# Patient Record
Sex: FEMALE | Race: WHITE | NOT HISPANIC OR LATINO | Employment: OTHER | URBAN - METROPOLITAN AREA
[De-identification: names, ages, dates, MRNs, and addresses within clinical notes are randomized per-mention and may not be internally consistent; named-entity substitution may affect disease eponyms.]

---

## 2017-01-03 ENCOUNTER — APPOINTMENT (OUTPATIENT)
Dept: PHYSICAL THERAPY | Facility: HOSPITAL | Age: 52
End: 2017-01-03

## 2017-01-03 ENCOUNTER — HOSPITAL ENCOUNTER (OUTPATIENT)
Dept: PHYSICAL THERAPY | Facility: HOSPITAL | Age: 52
Setting detail: THERAPIES SERIES
Discharge: HOME | End: 2017-01-03

## 2017-01-03 DIAGNOSIS — G89.29 CHRONIC BILATERAL LOW BACK PAIN, WITH SCIATICA PRESENCE UNSPECIFIED: Primary | ICD-10-CM

## 2017-01-03 DIAGNOSIS — M54.5 CHRONIC BILATERAL LOW BACK PAIN, WITH SCIATICA PRESENCE UNSPECIFIED: Primary | ICD-10-CM

## 2017-01-03 PROCEDURE — 97110 THERAPEUTIC EXERCISES: CPT | Performed by: PHYSICAL THERAPIST

## 2017-01-03 PROCEDURE — 97012 MECHANICAL TRACTION THERAPY: CPT | Performed by: PHYSICAL THERAPIST

## 2017-01-03 NOTE — PROGRESS NOTES
"    Outpatient Physical Therapy Ortho Treatment Note  [facility]     Patient Name: Braydon Soria  : 1965  MRN: [id number]  Today's Date: 1/3/2017      Visit Date: 2017    Visit Dx:    ICD-10-CM ICD-9-CM   1. Chronic bilateral low back pain, with sciatica presence unspecified M54.5 724.2    G89.29 338.29       Patient Active Problem List   Diagnosis   • Midline low back pain with left-sided sciatica   • Hyperreflexia   • Incomplete bladder emptying   • Thyroid enlargement        Past Medical History   Diagnosis Date   • Anxiety    • Arthritis    • Depression    • Gallbladder disease      gallstones   • Hyperlipidemia    • Hypertension    • Insomnia    • Low back pain    • Lumbosacral disc disease    • Migraines    • Thyroid nodule         Past Surgical History   Procedure Laterality Date   •  section       CHILDREN WERE BORN IN [place]   • Tonsillectomy     • Colonoscopy N/A 2016     Procedure: COLONOSCOPY TO CECUM WITH HOT SNARE POLYPECTOMY AND CLIPx1;  Surgeon: Molina Smith MD;  Location: [facility] ENDOSCOPY;  Service:    • Endoscopy N/A 2016     Procedure: ESOPHAGOGASTRODUODENOSCOPY;  Surgeon: Molina Smith MD;  Location: [facility] ENDOSCOPY;  Service:                              PT Assessment/Plan       17 1309          PT Assessment    Assessment Comments Passive stretching of B hip flexors continues to reproduce lumbar pain but overall is improved as pain rating was down to a 3-4/10 today. Progressing with core/extensor strengthening program. Continued with traction as she reports \"it is helping the most.\"  -CK      PT Plan    PT Plan Comments DTM to lumbar/gluteals for tissue tension release  -CK        User Key  (r) = Recorded By, (t) = Taken By, (c) = Cosigned By    Initials Name Provider Type    MAURICIO Fishman, PT Physical Therapist                Modalities       17 1000          Traction [id number]    Traction Type Lumbar  -CK      Rx Minutes 15  " -CK      Duration Intermittent  -CK      Position Hook-lying  -CK      Weight --   65/30  -CK      Hold 40  -CK      Relax 15  -CK        User Key  (r) = Recorded By, (t) = Taken By, (c) = Cosigned By    Initials Name Provider Type    CK Steve Fishman, PT Physical Therapist                Exercises       01/03/17 1000          Subjective Comments    Subjective Comments I am doing better. Still there a little but better  -CK      Subjective Pain    Able to rate subjective pain? yes  -CK      Pre-Treatment Pain Level 3  -CK      Post-Treatment Pain Level 3  -CK      Exercise 1    Exercise Name 1 Nustep for warm up- L5  -CK      Resistance 1 --   L5  -CK      Time (Minutes) 1 6  -CK      Exercise 2    Exercise Name 2 PPT  -CK      Cueing 2 Verbal;Tactile  -CK      Reps 2 5  -CK      Time (Seconds) 2 5  -CK      Exercise 4    Exercise Name 4 LTR with BLE on ball  -CK      Cueing 4 Verbal  -CK      Reps 4 10  -CK      Exercise 5    Exercise Name 5 calf stretch, off edge of step   Gastroc and soleus  -CK      Cueing 5 Verbal  -CK      Reps 5 3  -CK      Time (Seconds) 5 30  -CK      Exercise 6    Exercise Name 6 Standing HS stretch  -CK      Cueing 6 Verbal  -CK      Reps 6 3  -CK      Time (Seconds) 6 30  -CK      Exercise 7    Exercise Name 7 Prone on elbows  -CK      Cueing 7 Verbal  -CK      Reps 7 5  -CK      Time (Seconds) 7 10  -CK      Exercise 8    Exercise Name 8 Sidelying hip abduction  -CK      Cueing 8 Verbal  -CK      Equipment 8 Cuff Weight  -CK      Weights/Plates 8 3  -CK      Reps 8 15  -CK      Exercise 9    Exercise Name 9 Sidelying clamshells  -CK      Cueing 9 Verbal  -CK      Equipment 9 Theraband  -CK      Resistance 9 Green  -CK      Reps 9 15  -CK      Exercise 10    Exercise Name 10 Resisted shoulder extension  -CK      Cueing 10 Verbal  -CK      Equipment 10 Theraband  -CK      Resistance 10 Blue  -CK      Reps 10 15  -CK      Exercise 11    Exercise Name 11 Resisted Rows  -CK      Cueing 11  Verbal  -CK      Equipment 11 Theraband  -CK      Resistance 11 Blue  -CK      Reps 11 15  -CK      Exercise 12    Exercise Name 12 Bridges on therapy ball  -CK      Cueing 12 Verbal  -CK      Reps 12 10  -CK      Time (Seconds) 12 3  -CK      Exercise 13    Exercise Name 13 Passive stretching of B quads/hip flexors while prone  -CK      Reps 13 3  -CK      Time (Seconds) 13 30  -CK      Exercise 14    Exercise Name 14 Resisted lat pulls  -CK      Cueing 14 Verbal  -CK      Equipment 14 Theraband  -CK      Resistance 14 Blue  -CK      Reps 14 20  -CK      Exercise 15    Exercise Name 15 Prone hip extension  -CK      Cueing 15 Demo  -CK      Equipment 15 Cuff Weight  -CK      Weights/Plates 15 2  -CK      Reps 15 10  -CK        User Key  (r) = Recorded By, (t) = Taken By, (c) = Cosigned By    Initials Name Provider Type    CK Steve KISHORE Fishman, PT Physical Therapist                               PT OP Goals       01/03/17 1300          PT Short Term Goals    STG 1 Patient will be independent and compliant with initial home exercise program.  -CK      STG 1 Progress Met  -CK      STG 2 pt. to be educated in/verbalize understanding of the importance of posture, body mechanics, and/or ergonomics in association with their condition to facilitate self management of their condition  -CK      STG 2 Progress Progressing  -CK      Long Term Goals    LTG 1 Patient will be independent and compliant with advanced home exercise program to facilitate self-management of symptoms  -CK      LTG 1 Progress Progressing  -CK      LTG 2 Patient will score </= 20% on Oswestry Index, indicating improved perceived functional ability with daily activities  -CK      LTG 2 Progress Ongoing  -CK      LTG 3 Patient will be able to ambulate 30 min without rest break  -CK      LTG 3 Progress Ongoing  -CK      LTG 4 Patient will report </= 2-3/10 pain with all functional activities.  -CK      LTG 4 Progress Ongoing  -CK      LTG 4 Progress Comments  3-4/10 today  -CK      LTG 5 Patient will report 50% reduction in leg pain/tightness.  -CK      LTG 5 Progress Ongoing  -CK      LTG 5 Progress Comments improving per patient report, did not give a number  -CK        User Key  (r) = Recorded By, (t) = Taken By, (c) = Cosigned By    Initials Name Provider Type    CK Steve Fishman, PT Physical Therapist                    Time Calculation:   Start Time: 1030  Stop Time: 1115  Time Calculation (min): 45 min    Therapy Charges for Today     Code Description Service Date Service Provider Modifiers Qty    [id number]  PT THER PROC EA 15 MIN 1/3/2017 Steve Fishman, PT GP 2    [id number]  PT TRACTION LUMBAR 1/3/2017 Steve Fishman, PT GP 1                    Steve Fishman, PT  1/3/2017

## 2017-01-05 ENCOUNTER — APPOINTMENT (OUTPATIENT)
Dept: PHYSICAL THERAPY | Facility: HOSPITAL | Age: 52
End: 2017-01-05

## 2017-01-10 ENCOUNTER — APPOINTMENT (OUTPATIENT)
Dept: PREADMISSION TESTING | Facility: HOSPITAL | Age: 52
End: 2017-01-10

## 2017-01-10 VITALS
SYSTOLIC BLOOD PRESSURE: 145 MMHG | BODY MASS INDEX: 31.89 KG/M2 | DIASTOLIC BLOOD PRESSURE: 88 MMHG | HEART RATE: 85 BPM | OXYGEN SATURATION: 98 % | TEMPERATURE: 98.1 F | RESPIRATION RATE: 16 BRPM | HEIGHT: 65 IN | WEIGHT: 191.4 LBS

## 2017-01-10 LAB
ANION GAP SERPL CALCULATED.3IONS-SCNC: 15 MMOL/L
BUN BLD-MCNC: 10 MG/DL (ref 6–20)
BUN/CREAT SERPL: 14.9 (ref 7–25)
CALCIUM SPEC-SCNC: 9.7 MG/DL (ref 8.6–10.5)
CHLORIDE SERPL-SCNC: 100 MMOL/L (ref 98–107)
CO2 SERPL-SCNC: 25 MMOL/L (ref 22–29)
CREAT BLD-MCNC: 0.67 MG/DL (ref 0.57–1)
DEPRECATED RDW RBC AUTO: 43.4 FL (ref 37–54)
ERYTHROCYTE [DISTWIDTH] IN BLOOD BY AUTOMATED COUNT: 12.9 % (ref 11.7–13)
GFR SERPL CREATININE-BSD FRML MDRD: 93 ML/MIN/1.73
GLUCOSE BLD-MCNC: 92 MG/DL (ref 65–99)
HCT VFR BLD AUTO: 42.8 % (ref 35.6–45.5)
HGB BLD-MCNC: 14.1 G/DL (ref 11.9–15.5)
MCH RBC QN AUTO: 30.6 PG (ref 26.9–32)
MCHC RBC AUTO-ENTMCNC: 32.9 G/DL (ref 32.4–36.3)
MCV RBC AUTO: 92.8 FL (ref 80.5–98.2)
PLATELET # BLD AUTO: 313 10*3/MM3 (ref 140–500)
PMV BLD AUTO: 10.2 FL (ref 6–12)
POTASSIUM BLD-SCNC: 3.6 MMOL/L (ref 3.5–5.2)
RBC # BLD AUTO: 4.61 10*6/MM3 (ref 3.9–5.2)
SODIUM BLD-SCNC: 140 MMOL/L (ref 136–145)
WBC NRBC COR # BLD: 10.03 10*3/MM3 (ref 4.5–10.7)

## 2017-01-10 PROCEDURE — 36415 COLL VENOUS BLD VENIPUNCTURE: CPT

## 2017-01-10 PROCEDURE — 93005 ELECTROCARDIOGRAM TRACING: CPT

## 2017-01-10 PROCEDURE — 80048 BASIC METABOLIC PNL TOTAL CA: CPT | Performed by: SURGERY

## 2017-01-10 PROCEDURE — 85027 COMPLETE CBC AUTOMATED: CPT | Performed by: SURGERY

## 2017-01-10 PROCEDURE — 93010 ELECTROCARDIOGRAM REPORT: CPT | Performed by: INTERNAL MEDICINE

## 2017-01-10 NOTE — DISCHARGE INSTRUCTIONS
Take the following medications the morning of surgery with a small sip of water.    AMLODIPINE    ARRIVAL TIME  08:00      General Instructions:  • Do not eat or drink after midnight: includes water, mints, or gum. You may brush your teeth.  • Do not smoke, chew tobacco, or drink alcohol.  • Bring medications in original bottles, any inhalers and if applicable your C-PAP/ BI-PAP machine.  • Bring any papers given to you in the doctor’s office.  • Wear clean comfortable clothes and socks.  • Do not wear contact lenses or make-up.  Bring a case for your glasses if applicable.   • Bring crutches or walker if applicable.  • Leave all other valuables and jewelry at home.        Preventing a Surgical Site Infection:  Shower on the morning of surgery using a fresh bar of anti-bacterial soap (such as Dial) and clean washcloth.  Dry with a clean towel and dress in clean clothing.  For 2 to 3 days before surgery, avoid shaving with a razor near where you will have surgery because the razor can irritate skin and make it easier to develop an infection  Ask your surgeon if you will be receiving antibiotics prior to surgery  Make sure you, your family, and all healthcare providers clean their hands with soap and water or an alcohol based hand  before caring for you or your wound  If at all possible, quit smoking as many days before surgery as you can.    Day of surgery:  Upon arrival, a Pre-op nurse and Anesthesiologist will review your health history, obtain vital signs, and answer questions you may have.  The only belongings needed at this time will be your home medications and if applicable your C-PAP/BI-PAP machine.  If you are staying overnight your family can leave the rest of your belongings in the car and bring them to your room later.  A Pre-op nurse will start an IV and you may receive medication in preparation for surgery, including something to help you relax.  Your family will be able to see you in the  Pre-op area.  While you are in surgery your family should notify the waiting room  if they leave the waiting room area and provide a contact phone number.    Please be aware that surgery does come with discomfort.  We want to make every effort to control your discomfort so please discuss any uncontrolled symptoms with your nurse.   Your doctor will most likely have prescribed pain medications.      If you are going home after surgery you will receive individualized written care instructions before being discharged.  A responsible adult must drive you to and from the hospital on the day of your surgery and stay with you for 24 hours.    If you are staying overnight following surgery, you will be transported to your hospital room following the recovery period.  [facility] has all private rooms.    If you have any questions please call Pre-Admission Testing at [phone].  Deductibles and co-payments are collected on the day of service. Please be prepared to pay the required co-pay, deductible or deposit on the day of service as defined by your plan.

## 2017-01-10 NOTE — MR AVS SNAPSHOT
"                        Zemka Smajlagic   1/10/2017 1:30 PM   Appointment    Provider:  ESTELITA Jon   Department:  [facility] PREADMISSION T   Dept Phone:  [phone]                Your Full Care Plan           To Do List     1/12/2017 5:00 PM     Appointment with Steve Fishman, PT at [facility] ([phone])   [address] [facility] [address]            Your Updated Medication List          This list is accurate as of: 1/10/17  1:37 PM.  Always use your most recent med list.                amLODIPine 5 MG tablet   Commonly known as:  NORVASC       atorvastatin 20 MG tablet   Commonly known as:  LIPITOR       citalopram 20 MG tablet   Commonly known as:  CeleXA       TOPROL XL 50 MG 24 hr tablet   Generic drug:  metoprolol succinate XL               Klatcher Signup     Our records indicate that you have an active [facility] Klatcher account.    You can view your After Visit Summary by going to [employer] and logging in with your Klatcher username and password.  If you don't have a Klatcher username and password but a parent or guardian has access to your record, the parent or guardian should login with their own Klatcher username and password and access your record to view the After Visit Summary.    If you have questions, you can email [email or web address] or call [phone] to talk to our Klatcher staff.  Remember, Klatcher is NOT to be used for urgent needs.  For medical emergencies, dial 911.               Other Info from Your Visit           Allergies     No Known Allergies      Vital Signs     Blood Pressure Pulse Temperature Respirations Height Weight    145/88 (BP Location: Right arm, Patient Position: Sitting) 85 98.1 °F (36.7 °C) 16 65\" (165.1 cm) 191 lb 6.4 oz (86.8 kg)    Last Menstrual Period Oxygen Saturation Body Mass Index Smoking Status          01/01/2015 98% 31.85 kg/m2 Never Smoker          Discharge Instructions  "      Take the following medications the morning of surgery with a small sip of water.    AMLODIPINE    ARRIVAL TIME  08:00      General Instructions:  • Do not eat or drink after midnight: includes water, mints, or gum. You may brush your teeth.  • Do not smoke, chew tobacco, or drink alcohol.  • Bring medications in original bottles, any inhalers and if applicable your C-PAP/ BI-PAP machine.  • Bring any papers given to you in the doctor’s office.  • Wear clean comfortable clothes and socks.  • Do not wear contact lenses or make-up.  Bring a case for your glasses if applicable.   • Bring crutches or walker if applicable.  • Leave all other valuables and jewelry at home.        Preventing a Surgical Site Infection:  Shower on the morning of surgery using a fresh bar of anti-bacterial soap (such as Dial) and clean washcloth.  Dry with a clean towel and dress in clean clothing.  For 2 to 3 days before surgery, avoid shaving with a razor near where you will have surgery because the razor can irritate skin and make it easier to develop an infection  Ask your surgeon if you will be receiving antibiotics prior to surgery  Make sure you, your family, and all healthcare providers clean their hands with soap and water or an alcohol based hand  before caring for you or your wound  If at all possible, quit smoking as many days before surgery as you can.    Day of surgery:  Upon arrival, a Pre-op nurse and Anesthesiologist will review your health history, obtain vital signs, and answer questions you may have.  The only belongings needed at this time will be your home medications and if applicable your C-PAP/BI-PAP machine.  If you are staying overnight your family can leave the rest of your belongings in the car and bring them to your room later.  A Pre-op nurse will start an IV and you may receive medication in preparation for surgery, including something to help you relax.  Your family will be able to see you in the  Pre-op area.  While you are in surgery your family should notify the waiting room  if they leave the waiting room area and provide a contact phone number.    Please be aware that surgery does come with discomfort.  We want to make every effort to control your discomfort so please discuss any uncontrolled symptoms with your nurse.   Your doctor will most likely have prescribed pain medications.      If you are going home after surgery you will receive individualized written care instructions before being discharged.  A responsible adult must drive you to and from the hospital on the day of your surgery and stay with you for 24 hours.    If you are staying overnight following surgery, you will be transported to your hospital room following the recovery period.  [facility] has all private rooms.    If you have any questions please call Pre-Admission Testing at [phone].  Deductibles and co-payments are collected on the day of service. Please be prepared to pay the required co-pay, deductible or deposit on the day of service as defined by your plan.       SYMPTOMS OF A STROKE    Call 911 or have someone take you to the Emergency Department if you have any of the following:    · Sudden numbness or weakness of your face, arm or leg especially on one side of the body  · Sudden confusion, diffiiculty speaking or trouble understanding   · Changes in your vision or loss of sight in one eye  · Sudden severe headache with no known cause  · sudden dizziness, trouble walking, loss of balance or coordination    It is important to seek emergency care right away if you have further stroke symptoms. If you get emergency help quickly, the powerful clot-dissolving medicines can reduce the disabilities caused by a stroke.     For more information:    American Stroke Association  2-916-2-STROKE  www.strokeassociation.org           IF YOU SMOKE OR USE TOBACCO PLEASE READ THE FOLLOWING:    Why is smoking bad for  me?  Smoking increases the risk of heart disease, lung disease, vascular disease, stroke, and cancer.     If you smoke, STOP!    If you would like more information on quitting smoking, please visit the [employer] website: [email or web address]   This link will provide additional resources including the QUIT line and the [employer] support groups.     For more information:    American Cancer Society  ([phone] [phone]    American Heart Association  1-418.412.9814

## 2017-01-11 ENCOUNTER — TRANSCRIBE ORDERS (OUTPATIENT)
Dept: SURGERY | Facility: CLINIC | Age: 52
End: 2017-01-11

## 2017-01-11 DIAGNOSIS — Z01.818 PREOPERATIVE CLEARANCE: Primary | ICD-10-CM

## 2017-01-11 DIAGNOSIS — R94.31 ABNORMAL EKG: ICD-10-CM

## 2017-01-12 ENCOUNTER — OFFICE VISIT (OUTPATIENT)
Dept: CARDIOLOGY | Facility: CLINIC | Age: 52
End: 2017-01-12

## 2017-01-12 ENCOUNTER — APPOINTMENT (OUTPATIENT)
Dept: PHYSICAL THERAPY | Facility: HOSPITAL | Age: 52
End: 2017-01-12

## 2017-01-12 VITALS
WEIGHT: 189 LBS | HEIGHT: 65 IN | SYSTOLIC BLOOD PRESSURE: 140 MMHG | BODY MASS INDEX: 31.49 KG/M2 | RESPIRATION RATE: 16 BRPM | HEART RATE: 87 BPM | DIASTOLIC BLOOD PRESSURE: 100 MMHG

## 2017-01-12 DIAGNOSIS — I10 ESSENTIAL HYPERTENSION: ICD-10-CM

## 2017-01-12 DIAGNOSIS — Z01.810 PREOP CARDIOVASCULAR EXAM: Primary | ICD-10-CM

## 2017-01-12 PROCEDURE — 93000 ELECTROCARDIOGRAM COMPLETE: CPT | Performed by: INTERNAL MEDICINE

## 2017-01-12 PROCEDURE — 99203 OFFICE O/P NEW LOW 30 MIN: CPT | Performed by: INTERNAL MEDICINE

## 2017-01-12 NOTE — PROGRESS NOTES
PATIENTINFORMATION    Date of Office Visit: 2017  Encounter Provider: Chitra Castillo MD  Place of Service: [facility] CARDIOLOGY  Patient Name: Braydon Soria  : 1965    Subjective:     Encounter Date:2017      Patient ID: Braydon Soria is a 51 y.o. female.      History of Present Illness     This is a woman, who I saw in the hospital back in  with chest pain.  She ruled out for myocardial infarction and I discharged her home.  She came back to the office in 2013 and had an exercise echocardiogram, which showed normal LV systolic function at rest with an ejection fraction of 63% and no significant valvular heart disease.  The stress portion of the test was normal.  She had Bhumi performed in 2015 due to leg pain and those were negative.  She is supposed to be getting her gallbladder out tomorrow.  Her preoperative EKG was read as abnormal and she was referred to see me.  She has a history of that includes hypertension and hyperlipidemia.  She denies any chest pain, shortness of breath or lower extremity edema.  She is not formerly exercising due to back and leg pain, but she is starting to go to physical therapy.  She does walking around her neighborhood and has no symptoms with exertion.  Her other risk factor is a premature family history of coronary disease in her brother and father both at age less than 50.      Review of Systems   Constitution: Negative for fever, malaise/fatigue, weight gain and weight loss.   HENT: Negative for ear pain, hearing loss, nosebleeds and sore throat.    Eyes: Negative for double vision, pain, vision loss in left eye and vision loss in right eye.   Cardiovascular:        See history of present illness.   Respiratory: Positive for snoring. Negative for cough, shortness of breath, sleep disturbances due to breathing and wheezing.    Endocrine: Negative for cold intolerance, heat intolerance and polyuria.   Skin:  "Negative for itching, poor wound healing and rash.   Musculoskeletal: Positive for joint pain. Negative for joint swelling and myalgias.   Gastrointestinal: Negative for abdominal pain, diarrhea, hematochezia, nausea and vomiting.   Genitourinary: Negative for hematuria and hesitancy.   Neurological: Negative for numbness, paresthesias and seizures.   Psychiatric/Behavioral: Positive for depression. The patient is not nervous/anxious.            ECG 12 Lead  Date/Time: 1/12/2017 11:10 AM  Performed by: PIOTR FLORES  Authorized by: PIOTR FLORES   Comparison: compared with previous ECG from 1/10/2017  Similar to previous ECG  Rhythm: sinus rhythm  BPM: 87  Conduction: conduction normal  ST Segments: ST segments normal  Clinical impression: normal ECG               Objective:       Visit Vitals   • /100 (BP Location: Right arm, Patient Position: Sitting, Cuff Size: Adult)   • Pulse 87   • Resp 16   • Ht 65\" (165.1 cm)   • Wt 189 lb (85.7 kg)   • LMP 01/01/2015   • BMI 31.45 kg/m2    Body mass index is 31.45 kg/(m^2).     Physical Exam   Constitutional: She appears well-developed.   HENT:   Head: Normocephalic and atraumatic.   Eyes: Conjunctivae and lids are normal. Pupils are equal, round, and reactive to light. Lids are everted and swept, no foreign bodies found.   Neck: Normal range of motion. No JVD present. Carotid bruit is not present. No tracheal deviation present. No thyroid mass present.   Cardiovascular: Normal rate, regular rhythm and normal heart sounds.    Pulses:       Dorsalis pedis pulses are 2+ on the right side, and 2+ on the left side.   Pulmonary/Chest: Effort normal and breath sounds normal.   Abdominal: Normal appearance and bowel sounds are normal.   Musculoskeletal: Normal range of motion.   Neurological: She is alert. She has normal strength.   Skin: Skin is warm, dry and intact.   Psychiatric: She has a normal mood and affect. Her behavior is normal.   Vitals reviewed.      Lab " Review:  I reviewed her lab work from her preop labs      Assessment/Plan:        1. She is Kendell Revised Risk Class 1, which confers a 0.4% chance of cardiovascular event with surgery.  She is not having any active symptoms to indicate angina.  Her EKG has T-wave inversion in lead V3, but that can be a normal variant.  I do not recommend any further cardiac testing prior to surgery, given that she is low risk for cardiovascular event  and has no symptoms to suggest active angina and can complete greater than 4 METS.    2. Hypertension.  Her blood pressure is a little bit high here today.  I have encouraged her to check her blood pressure on a regular basis, write the numbers down and meet up with Dr. Carbajal after her surgery.    3. Hyperlipidemia.  The patient is on Atorvastatin; followed by Dr. Carbajal.       I will be happy to see her back in the future if she has any problems. Please do not hesitate to call me if she has any issues with her surgery.         Orders Placed This Encounter   Procedures   • ECG 12 Lead     This order was created via procedure documentation      Braydon Soria   Home Medication Instructions ZULAY:    Printed on:01/12/17 1111   Medication Information                      amLODIPine (NORVASC) 5 MG tablet  5 mg Daily With Breakfast.             atorvastatin (LIPITOR) 20 MG tablet  20 mg Daily.             citalopram (CeleXA) 20 MG tablet  40 mg daily.             TOPROL XL 50 MG 24 hr tablet  50 mg Daily.                        Chitra Castillo MD  01/12/17  11:11 AM

## 2017-01-12 NOTE — MR AVS SNAPSHOT
[facility] CARDIOLOGY  [phone]                    Ganeshmmina Smajlagic   1/12/2017 11:20 AM   Office Visit    Dept Phone:  [phone]   Encounter #:  [id number]    Provider:  Chitra Castillo MD   Department:  [facility] CARDIOLOGY                Your Full Care Plan              Your Updated Medication List          This list is accurate as of: 1/12/17 10:53 AM.  Always use your most recent med list.                amLODIPine 5 MG tablet   Commonly known as:  NORVASC       atorvastatin 20 MG tablet   Commonly known as:  LIPITOR       citalopram 20 MG tablet   Commonly known as:  CeleXA       TOPROL XL 50 MG 24 hr tablet   Generic drug:  metoprolol succinate XL               Instructions     None    Patient Instructions History      Upcoming Appointments     Visit Type Date Time Department    TREATMENT 1/12/2017  5:00 PM [facility]    NEW PATIENT 1/12/2017 11:20 AM [facility] Signup     Our records indicate that you have an active [employer] account.    You can view your After Visit Summary by going to [email or web address] and logging in with your [employer] username and password.  If you don't have a [employer] username and password but a parent or guardian has access to your record, the parent or guardian should login with their own [employer] username and password and access your record to view the After Visit Summary.    If you have questions, you can email [email or web address] or call [phone] to talk to our [employer] staff.  Remember, [employer] is NOT to be used for urgent needs.  For medical emergencies, dial 911.               Other Info from Your Visit           Your Appointments     Jan 12, 2017 11:20 AM EST   New Patient with Chitra Castillo MD   [facility] C[facility]IOLOGY (--)    [facility] [address] [address] [phone]           Bring all  "previous medical records and films, along with current medications and insurance information.            Jan 12, 2017  5:00 PM EST   Therapy Treatment with Steve Fishman, PT   [facility] OP PT [address] [address] [phone]              Allergies     No Known Allergies      Vital Signs     Blood Pressure Pulse Respirations Height Weight Last Menstrual Period    140/100 (BP Location: Right arm, Patient Position: Sitting, Cuff Size: Adult) 87 16 65\" (165.1 cm) 189 lb (85.7 kg) 01/01/2015    Body Mass Index Smoking Status                31.45 kg/m2 Never Smoker            "

## 2017-01-13 ENCOUNTER — ANESTHESIA (OUTPATIENT)
Dept: PERIOP | Facility: HOSPITAL | Age: 52
End: 2017-01-13

## 2017-01-13 ENCOUNTER — ANESTHESIA EVENT (OUTPATIENT)
Dept: PERIOP | Facility: HOSPITAL | Age: 52
End: 2017-01-13

## 2017-01-13 ENCOUNTER — APPOINTMENT (OUTPATIENT)
Dept: GENERAL RADIOLOGY | Facility: HOSPITAL | Age: 52
End: 2017-01-13

## 2017-01-13 PROCEDURE — 25010000002 ONDANSETRON PER 1 MG: Performed by: ANESTHESIOLOGY

## 2017-01-13 PROCEDURE — 25010000002 DEXAMETHASONE PER 1 MG: Performed by: ANESTHESIOLOGY

## 2017-01-13 PROCEDURE — 25010000002 NEOSTIGMINE 10 MG/10ML SOLUTION: Performed by: NURSE ANESTHETIST, CERTIFIED REGISTERED

## 2017-01-13 PROCEDURE — 25010000002 FENTANYL CITRATE (PF) 100 MCG/2ML SOLUTION: Performed by: ANESTHESIOLOGY

## 2017-01-13 PROCEDURE — 74300 X-RAY BILE DUCTS/PANCREAS: CPT

## 2017-01-13 PROCEDURE — 25010000002 PROPOFOL 10 MG/ML EMULSION: Performed by: ANESTHESIOLOGY

## 2017-01-13 PROCEDURE — 25010000002 KETOROLAC TROMETHAMINE PER 15 MG: Performed by: ANESTHESIOLOGY

## 2017-01-13 RX ORDER — ONDANSETRON 2 MG/ML
INJECTION INTRAMUSCULAR; INTRAVENOUS AS NEEDED
Status: DISCONTINUED | OUTPATIENT
Start: 2017-01-13 | End: 2017-01-13 | Stop reason: SURG

## 2017-01-13 RX ORDER — NEOSTIGMINE METHYLSULFATE 1 MG/ML
INJECTION, SOLUTION INTRAVENOUS AS NEEDED
Status: DISCONTINUED | OUTPATIENT
Start: 2017-01-13 | End: 2017-01-13 | Stop reason: SURG

## 2017-01-13 RX ORDER — GLYCOPYRROLATE 0.2 MG/ML
INJECTION INTRAMUSCULAR; INTRAVENOUS AS NEEDED
Status: DISCONTINUED | OUTPATIENT
Start: 2017-01-13 | End: 2017-01-13 | Stop reason: SURG

## 2017-01-13 RX ORDER — ROCURONIUM BROMIDE 10 MG/ML
INJECTION, SOLUTION INTRAVENOUS AS NEEDED
Status: DISCONTINUED | OUTPATIENT
Start: 2017-01-13 | End: 2017-01-13 | Stop reason: SURG

## 2017-01-13 RX ORDER — FENTANYL CITRATE 50 UG/ML
INJECTION, SOLUTION INTRAMUSCULAR; INTRAVENOUS AS NEEDED
Status: DISCONTINUED | OUTPATIENT
Start: 2017-01-13 | End: 2017-01-13 | Stop reason: SURG

## 2017-01-13 RX ORDER — KETOROLAC TROMETHAMINE 30 MG/ML
INJECTION, SOLUTION INTRAMUSCULAR; INTRAVENOUS AS NEEDED
Status: DISCONTINUED | OUTPATIENT
Start: 2017-01-13 | End: 2017-01-13 | Stop reason: SURG

## 2017-01-13 RX ORDER — DEXAMETHASONE SODIUM PHOSPHATE 10 MG/ML
INJECTION INTRAMUSCULAR; INTRAVENOUS AS NEEDED
Status: DISCONTINUED | OUTPATIENT
Start: 2017-01-13 | End: 2017-01-13 | Stop reason: SURG

## 2017-01-13 RX ORDER — PROPOFOL 10 MG/ML
VIAL (ML) INTRAVENOUS AS NEEDED
Status: DISCONTINUED | OUTPATIENT
Start: 2017-01-13 | End: 2017-01-13 | Stop reason: SURG

## 2017-01-13 RX ADMIN — FENTANYL CITRATE 50 MCG: 50 INJECTION INTRAMUSCULAR; INTRAVENOUS at 10:07

## 2017-01-13 RX ADMIN — FENTANYL CITRATE 50 MCG: 50 INJECTION INTRAMUSCULAR; INTRAVENOUS at 10:09

## 2017-01-13 RX ADMIN — NEOSTIGMINE METHYLSULFATE 3 MG: 1 INJECTION INTRAVENOUS at 09:52

## 2017-01-13 RX ADMIN — KETOROLAC TROMETHAMINE 30 MG: 30 INJECTION, SOLUTION INTRAMUSCULAR; INTRAVENOUS at 09:34

## 2017-01-13 RX ADMIN — SODIUM CHLORIDE, POTASSIUM CHLORIDE, SODIUM LACTATE AND CALCIUM CHLORIDE: 600; 310; 30; 20 INJECTION, SOLUTION INTRAVENOUS at 10:00

## 2017-01-13 RX ADMIN — DEXAMETHASONE SODIUM PHOSPHATE 8 MG: 10 INJECTION INTRAMUSCULAR; INTRAVENOUS at 09:34

## 2017-01-13 RX ADMIN — FENTANYL CITRATE 50 MCG: 50 INJECTION INTRAMUSCULAR; INTRAVENOUS at 09:30

## 2017-01-13 RX ADMIN — PROPOFOL 170 MG: 10 INJECTION, EMULSION INTRAVENOUS at 09:26

## 2017-01-13 RX ADMIN — GLYCOPYRROLATE 0.6 MG: 0.2 INJECTION INTRAMUSCULAR; INTRAVENOUS at 09:52

## 2017-01-13 RX ADMIN — ROCURONIUM BROMIDE 20 MG: 10 INJECTION INTRAVENOUS at 09:27

## 2017-01-13 RX ADMIN — ONDANSETRON 4 MG: 2 INJECTION INTRAMUSCULAR; INTRAVENOUS at 09:34

## 2017-01-13 NOTE — ANESTHESIA PREPROCEDURE EVALUATION
Anesthesia Evaluation     Patient summary reviewed and Nursing notes reviewed    Airway   Mallampati: II  TM distance: >3 FB  Neck ROM: full  no difficulty expected  Dental - normal exam     Pulmonary - negative pulmonary ROS and normal exam   Cardiovascular - normal exam  Exercise tolerance: good (4-7 METS)  (+) hypertension,     ECG reviewed  Patient on routine beta blocker and Beta blocker given within 24 hours of surgery  Rhythm: regular  Rate: normal    Neuro/Psych  (+) headaches, psychiatric history Anxiety and Depression,    GI/Hepatic/Renal/Endo    (+) obesity,      Musculoskeletal     Abdominal  - normal exam   Substance History - negative use     OB/GYN          Other   (+) arthritis                          Anesthesia Plan    ASA 2     general     intravenous induction   Anesthetic plan and risks discussed with patient.

## 2017-01-13 NOTE — ANESTHESIA POSTPROCEDURE EVALUATION
Patient: Braydon Smajlagic    Procedure Summary     Date Anesthesia Start Anesthesia Stop Room / Location    01/13/17 0923 1010  [facility] OR  /  [facility]       Procedure Diagnosis Surgeon Provider    CHOLECYSTECTOMY LAPAROSCOPIC INTRAOPERATIVE CHOLANGIOGRAM (N/A Abdomen) Calculus of gallbladder without cholecystitis without obstruction  (Calculus of gallbladder without cholecystitis without obstruction [K80.20]) MD Sage Montilla MD          Anesthesia Type: general  Last vitals  /79 (01/13/17 1130)    Temp 36.2 °C (97.2 °F) (01/13/17 1115)    Pulse 66 (01/13/17 1130)   Resp 16 (01/13/17 1130)    SpO2 99 % (01/13/17 1130)      Post Anesthesia Care and Evaluation    Patient location during evaluation: bedside  Patient participation: complete - patient participated  Level of consciousness: awake and alert  Pain management: adequate  Airway patency: patent  Anesthetic complications: No anesthetic complications    Cardiovascular status: acceptable  Respiratory status: acceptable  Hydration status: acceptable

## 2017-01-13 NOTE — ANESTHESIA PROCEDURE NOTES
Airway  Urgency: elective    Date/Time: 1/13/2017 9:31 AM  Airway not difficult    General Information and Staff    Patient location during procedure: OR  Anesthesiologist: SAYDA CHAIDEZ    Indications and Patient Condition  Indications for airway management: airway protection    Preoxygenated: yes  Mask difficulty assessment: 1 - vent by mask    Final Airway Details  Final airway type: endotracheal airway      Successful airway: ETT  Cuffed: yes   Successful intubation technique: direct laryngoscopy  Endotracheal tube insertion site: oral  Blade: Jose  Blade size: #2  ETT size: 7.0 mm  Cormack-Lehane Classification: grade I - full view of glottis  Placement verified by: chest auscultation and capnometry   Measured from: teeth  ETT to teeth (cm): 19  Number of attempts at approach: 1    Additional Comments  Pre oxygenated  Easy mask vent  Atraumatic intubation  + etco2  Breath sounds equal bilaterally

## 2017-01-17 ENCOUNTER — APPOINTMENT (OUTPATIENT)
Dept: PHYSICAL THERAPY | Facility: HOSPITAL | Age: 52
End: 2017-01-17

## 2017-01-23 ENCOUNTER — OFFICE VISIT (OUTPATIENT)
Dept: SURGERY | Facility: CLINIC | Age: 52
End: 2017-01-23

## 2017-01-23 DIAGNOSIS — Z09 FOLLOW UP: Primary | ICD-10-CM

## 2017-01-23 PROCEDURE — 99024 POSTOP FOLLOW-UP VISIT: CPT | Performed by: SURGERY

## 2017-01-23 NOTE — MR AVS SNAPSHOT
Zemka Smajlagic   1/23/2017 11:00 AM   Office Visit    Dept Phone:  [phone]   Encounter #:  [id number]    Provider:  Molina Smith MD   Department:  [facility] GENERAL SURGERY                Your Full Care Plan              Today's Medication Changes          These changes are accurate as of: 1/23/17 11:28 AM.  If you have any questions, ask your nurse or doctor.               Stop taking medication(s)listed here:     promethazine 25 MG tablet   Commonly known as:  PHENERGAN   Stopped by:  Molina Smith MD                      Your Updated Medication List          This list is accurate as of: 1/23/17 11:28 AM.  Always use your most recent med list.                amLODIPine 5 MG tablet   Commonly known as:  NORVASC       atorvastatin 20 MG tablet   Commonly known as:  LIPITOR       citalopram 20 MG tablet   Commonly known as:  CeleXA       HYDROcodone-acetaminophen 5-325 MG per tablet   Commonly known as:  NORCO   1-2 by mouth every four hours as needed for pain       TOPROL XL 50 MG 24 hr tablet   Generic drug:  metoprolol succinate XL               Instructions     None    Patient Instructions History      Upcoming Appointments     Visit Type Date Time Department    POST-OP 1/23/2017 11:00 AM [facility]      [facility] Signup     Our records indicate that you have an active [employer] account.    You can view your After Visit Summary by going to [employer] and logging in with your [facility] username and password.  If you don't have a [facility] username and password but a parent or guardian has access to your record, the parent or guardian should login with their own [facility] username and password and access your record to view the After Visit Summary.    If you have questions, you can email [email or web address] or call [phone] to talk to our [facility] staff.  Remember, [facility] is NOT to be used for urgent needs.  For  medical emergencies, dial 911.               Other Info from Your Visit           Allergies     No Known Allergies      Reason for Visit     Post-op PO Laparoscopic cholecystectomy with cholangiogram 1/13/17      Vital Signs     Last Menstrual Period Smoking Status                01/01/2015 Never Smoker

## 2017-01-24 NOTE — PROGRESS NOTES
Postoperative visit    1/13/17 laparoscopic cholecystectomy with cholangiogram  Pathology gallstones  Cholangiogram normal    Doing well, incisions healing well, reporting no problems from the surgery.  Preoperative colonoscopy showed a single hyperplastic polyp and she is therefore due surveillance colonoscopy in 10 years.

## 2017-01-26 ENCOUNTER — TELEPHONE (OUTPATIENT)
Dept: SURGERY | Facility: CLINIC | Age: 52
End: 2017-01-26

## 2017-01-26 NOTE — TELEPHONE ENCOUNTER
----- Message from Molina Smith MD sent at 1/24/2017  1:22 PM EST -----  Please put in computer for 10 years surveillance colonoscopy reminder

## 2017-02-03 ENCOUNTER — DOCUMENTATION (OUTPATIENT)
Dept: PHYSICAL THERAPY | Facility: HOSPITAL | Age: 52
End: 2017-02-03

## 2017-02-03 NOTE — THERAPY DISCHARGE NOTE
Outpatient Physical Therapy Discharge Summary         Patient Name: Braydon Soria  : 1965  MRN: [id number]    Today's Date: 2/3/2017    Visit Dx:  No diagnosis found.          PT OP Goals       17 0800          PT Short Term Goals    STG 1 Patient will be independent and compliant with initial home exercise program.  -CK      STG 1 Progress Met  -CK      STG 2 pt. to be educated in/verbalize understanding of the importance of posture, body mechanics, and/or ergonomics in association with their condition to facilitate self management of their condition  -CK      STG 2 Progress Met  -CK      Long Term Goals    LTG 1 Patient will be independent and compliant with advanced home exercise program to facilitate self-management of symptoms  -CK      LTG 1 Progress Met  -CK      LTG 2 Patient will score </= 20% on Oswestry Index, indicating improved perceived functional ability with daily activities  -CK      LTG 2 Progress Met  -CK      LTG 3 Patient will be able to ambulate 30 min without rest break  -CK      LTG 3 Progress Met  -CK      LTG 4 Patient will report </= 2-3/10 pain with all functional activities.  -CK      LTG 4 Progress Met  -CK      LTG 5 Patient will report 50% reduction in leg pain/tightness.  -CK      LTG 5 Progress Ongoing;Met  -CK        User Key  (r) = Recorded By, (t) = Taken By, (c) = Cosigned By    Initials Name Provider Type    MAURICIO Fishman, PT Physical Therapist          OP PT Discharge Summary  Date of Discharge: 17  Reason for Discharge: All goals achieved  Outcomes Achieved: Able to achieve all goals within established timeline  Discharge Destination: Home with home program  Discharge Instructions: Mrs. Soria was seen for 8 skilled therapy sessions since initiating therapy for low back pain. She has met all her goals at this time and is ready for discharge.       Time Calculation:                    Steve Fishman, PT  2/3/2017

## 2017-03-16 ENCOUNTER — TELEPHONE (OUTPATIENT)
Dept: NEUROSURGERY | Facility: CLINIC | Age: 52
End: 2017-03-16

## 2017-03-16 NOTE — TELEPHONE ENCOUNTER
"----- Message from Obed Morton sent at 3/16/2017  2:04 PM EDT -----  Regarding: Call Patient  Contact: [phone]  Pt called stating she needs to see Dr Redd again.  She just finished PT and says it didn't help her back.  Pt did not describe the problem/pain, just \"physical therapy didn't fix my back\".  Her english is somewhat broken.    I noticed the last office note stated no follow up on this Pt.      Are we continuing care with her?  Who should she be scheduled with, if so?  "

## 2017-03-17 NOTE — TELEPHONE ENCOUNTER
Per JSC: There is nothing he can offer this patient. He reviewed her imaging and she is not surgical.

## 2017-03-29 ENCOUNTER — TRANSCRIBE ORDERS (OUTPATIENT)
Dept: ADMINISTRATIVE | Facility: HOSPITAL | Age: 52
End: 2017-03-29

## 2017-03-29 DIAGNOSIS — R20.2 NUMBNESS AND TINGLING: Primary | ICD-10-CM

## 2017-03-29 DIAGNOSIS — R20.0 NUMBNESS AND TINGLING: Primary | ICD-10-CM

## 2017-04-05 ENCOUNTER — HOSPITAL ENCOUNTER (OUTPATIENT)
Dept: INFUSION THERAPY | Facility: HOSPITAL | Age: 52
Discharge: HOME | End: 2017-04-05
Attending: SPECIALIST | Admitting: PHYSICAL MEDICINE & REHABILITATION

## 2017-04-05 DIAGNOSIS — R20.0 NUMBNESS AND TINGLING: ICD-10-CM

## 2017-04-05 DIAGNOSIS — R20.2 NUMBNESS AND TINGLING: ICD-10-CM

## 2017-04-05 PROCEDURE — 95886 MUSC TEST DONE W/N TEST COMP: CPT

## 2017-04-05 PROCEDURE — 95909 NRV CNDJ TST 5-6 STUDIES: CPT

## 2017-06-26 ENCOUNTER — OFFICE VISIT (OUTPATIENT)
Dept: NEUROSURGERY | Facility: CLINIC | Age: 52
End: 2017-06-26

## 2017-06-26 VITALS
SYSTOLIC BLOOD PRESSURE: 131 MMHG | HEART RATE: 79 BPM | HEIGHT: 65 IN | BODY MASS INDEX: 29.99 KG/M2 | WEIGHT: 180 LBS | DIASTOLIC BLOOD PRESSURE: 90 MMHG

## 2017-06-26 DIAGNOSIS — M54.41 CHRONIC BILATERAL LOW BACK PAIN WITH BILATERAL SCIATICA: Primary | ICD-10-CM

## 2017-06-26 DIAGNOSIS — G89.29 CHRONIC BILATERAL LOW BACK PAIN WITH BILATERAL SCIATICA: Primary | ICD-10-CM

## 2017-06-26 DIAGNOSIS — M54.42 CHRONIC BILATERAL LOW BACK PAIN WITH BILATERAL SCIATICA: Primary | ICD-10-CM

## 2017-06-26 DIAGNOSIS — M51.36 DDD (DEGENERATIVE DISC DISEASE), LUMBAR: ICD-10-CM

## 2017-06-26 PROBLEM — M51.369 DDD (DEGENERATIVE DISC DISEASE), LUMBAR: Status: ACTIVE | Noted: 2017-06-26

## 2017-06-26 PROCEDURE — 99213 OFFICE O/P EST LOW 20 MIN: CPT | Performed by: NEUROLOGICAL SURGERY

## 2017-06-26 RX ORDER — MELOXICAM 15 MG/1
TABLET ORAL
COMMUNITY
Start: 2017-03-29 | End: 2017-07-14

## 2017-06-26 RX ORDER — PRAZOSIN HYDROCHLORIDE 5 MG/1
5 CAPSULE ORAL NIGHTLY
COMMUNITY
Start: 2017-04-06 | End: 2020-02-28

## 2017-06-26 NOTE — PROGRESS NOTES
Subjective   Patient ID: Braydon Soria is a 51 y.o. female is being seen for consultation today at the request of Ramiro Carbajal MD for second opinion of low back and left leg pain.    The patient notes a history of back and left leg pain which has been present over the past 2 years.      The patient notes that she has been treated with pain management and physical therapy, but notes that these did not help her pain symptoms.    The patient has been evaluated by Dr. Redd in the past, but surgery was not recommended.     The patient notes that she is currently taking Meloxicam 15 mg BID.    Back Pain   This is a chronic problem. The current episode started more than 1 year ago. The problem occurs daily. The problem has been gradually worsening since onset. The pain is present in the lumbar spine. The quality of the pain is described as aching. The pain radiates to the left thigh and left knee. Pertinent negatives include no bladder incontinence, bowel incontinence, numbness, tingling or weakness. Treatments tried: PT/Pain management. The treatment provided mild relief.       The following portions of the patient's history were reviewed and updated as appropriate: allergies, current medications, past family history, past medical history, past social history, past surgical history and problem list.    Review of Systems   HENT: Positive for congestion, rhinorrhea, sinus pressure and sore throat.    Eyes: Positive for itching.   Cardiovascular: Positive for leg swelling.   Gastrointestinal: Negative for bowel incontinence.   Genitourinary: Negative for bladder incontinence.   Musculoskeletal: Positive for back pain and myalgias.   Neurological: Negative for tingling, weakness and numbness.   Psychiatric/Behavioral: Positive for dysphoric mood.   All other systems reviewed and are negative.    The patient is here with her son as a .  She is a [place]  immigrant.  She is not a smoker.  She used to work in  housekeeping.  She has about a two-year history of low back pain and bilateral leg pain.  She is on a statin medication.  She has seen Dr. Redd.  He did not recommend any surgery.  Therapy and blocks were tried and this patient.  They have not helped.  I reviewed her MRI and discussed with her and her son.  She certainly does have degenerative disc disease.  That can certainly explain pain but I would agree with Dr. Redd that nothing surgical is needed.  The statin that she is on may be contributing to her leg pain but it could be equally be a form of referred pain from the spine.  She does seem to be worse in her back with extension which suggests some component from facet disease.  She is taking applying for disability.  I told her her records here could certainly be accessed by her .  She might benefit from another referral to pain management for facet blocks, medial branch blocks, possible RFA.  She did have epidurals and physical therapy earlier this year which did not help her.      Objective   Physical Exam   Constitutional: She is oriented to person, place, and time. She appears well-developed and well-nourished.   HENT:   Head: Normocephalic and atraumatic.   Eyes: Conjunctivae and EOM are normal. Pupils are equal, round, and reactive to light.   Fundoscopic exam:       The right eye shows no papilledema. The right eye shows venous pulsations.        The left eye shows no papilledema. The left eye shows venous pulsations.   Neck: Carotid bruit is not present.   Neurological: She is oriented to person, place, and time. She has a normal Finger-Nose-Finger Test and a normal Heel to Shin Test. Gait normal.   Reflex Scores:       Tricep reflexes are 2+ on the right side and 2+ on the left side.       Bicep reflexes are 2+ on the right side and 2+ on the left side.       Brachioradialis reflexes are 2+ on the right side and 2+ on the left side.       Patellar reflexes are 2+ on the right side and 2+ on  the left side.       Achilles reflexes are 2+ on the right side and 2+ on the left side.  Psychiatric: Her speech is normal.     Neurologic Exam     Mental Status   Oriented to person, place, and time.   Registration of memory: Good recent and remote memory.   Attention: normal. Concentration: normal.   Speech: speech is normal   Level of consciousness: alert  Knowledge: consistent with education.     Cranial Nerves     CN II   Visual fields full to confrontation.   Visual acuity: normal    CN III, IV, VI   Pupils are equal, round, and reactive to light.  Extraocular motions are normal.     CN V   Facial sensation intact.   Right corneal reflex: normal  Left corneal reflex: normal    CN VII   Facial expression full, symmetric.   Right facial weakness: none  Left facial weakness: none    CN VIII   Hearing: intact    CN IX, X   Palate: symmetric    CN XI   Right sternocleidomastoid strength: normal  Left sternocleidomastoid strength: normal    CN XII   Tongue: not atrophic  Tongue deviation: none    Motor Exam   Muscle bulk: normal  Right arm tone: normal  Left arm tone: normal  Right leg tone: normal  Left leg tone: normal    Strength   Strength 5/5 except as noted.     Sensory Exam   Light touch normal.     Gait, Coordination, and Reflexes     Gait  Gait: normal    Coordination   Finger to nose coordination: normal  Heel to shin coordination: normal    Reflexes   Right brachioradialis: 2+  Left brachioradialis: 2+  Right biceps: 2+  Left biceps: 2+  Right triceps: 2+  Left triceps: 2+  Right patellar: 2+  Left patellar: 2+  Right achilles: 2+  Left achilles: 2+  Right : 2+  Left : 2+      Assessment/Plan   Independent Review of Radiographic Studies:    I reviewed her cervical thoracic and lumbar MRIs done earlier this year on 1/70/16.  She has an annular tear at L4-L5 and a small disc L5 to L5-S1 otherwise is fairly nondescript in the lumbar spine.  The cervical spine very much the same with some mild  cervical degeneration but no severe cord or root likewise for the thoracic spine.  I agree with the reports.      Medical Decision Making:    I agree with Dr. Redd.  Certainly no surgery is needed.  I think her pain in her back is from her degenerative disc disease.  She may want to talk to Dr. Carbajal about trying another medicine other than atorvastatin.  As that drug may make a contribution to her leg pain.  We will refer her to pain management.  Perhaps facet blocks, medial branch blocks, and possible radiofrequency ablation could be helpful to her and keep her functional.  We'll keep it open ended here.  I don't have much further to add other than that.      Braydon was seen today for back pain.    Diagnoses and all orders for this visit:    Chronic bilateral low back pain with bilateral sciatica  -     Ambulatory Referral to Pain Management    DDD (degenerative disc disease), lumbar  -     Ambulatory Referral to Pain Management    Return if symptoms worsen or fail to improve.

## 2017-07-14 ENCOUNTER — OFFICE VISIT (OUTPATIENT)
Dept: PAIN MEDICINE | Facility: CLINIC | Age: 52
End: 2017-07-14

## 2017-07-14 VITALS
HEART RATE: 75 BPM | OXYGEN SATURATION: 96 % | DIASTOLIC BLOOD PRESSURE: 96 MMHG | RESPIRATION RATE: 16 BRPM | WEIGHT: 185 LBS | SYSTOLIC BLOOD PRESSURE: 134 MMHG | BODY MASS INDEX: 30.82 KG/M2 | TEMPERATURE: 97.4 F | HEIGHT: 65 IN

## 2017-07-14 DIAGNOSIS — M54.41 CHRONIC BILATERAL LOW BACK PAIN WITH BILATERAL SCIATICA: Primary | ICD-10-CM

## 2017-07-14 DIAGNOSIS — M43.06 LUMBAR SPONDYLOLYSIS: ICD-10-CM

## 2017-07-14 DIAGNOSIS — M51.36 DDD (DEGENERATIVE DISC DISEASE), LUMBAR: ICD-10-CM

## 2017-07-14 DIAGNOSIS — M54.42 CHRONIC BILATERAL LOW BACK PAIN WITH BILATERAL SCIATICA: Primary | ICD-10-CM

## 2017-07-14 DIAGNOSIS — G89.29 CHRONIC BILATERAL LOW BACK PAIN WITH BILATERAL SCIATICA: Primary | ICD-10-CM

## 2017-07-14 LAB
POC AMPHETAMINES: NEGATIVE
POC BARBITURATES: NEGATIVE
POC BENZODIAZEPHINES: NEGATIVE
POC COCAINE: NEGATIVE
POC METHADONE: NEGATIVE
POC METHAMPHETAMINE SCREEN URINE: NEGATIVE
POC OPIATES: NEGATIVE
POC OXYCODONE: NEGATIVE
POC PHENCYCLIDINE: NEGATIVE
POC PROPOXYPHENE: NEGATIVE
POC THC: NEGATIVE
POC TRICYCLIC ANTIDEPRESSANTS: NEGATIVE

## 2017-07-14 PROCEDURE — 99204 OFFICE O/P NEW MOD 45 MIN: CPT | Performed by: PAIN MEDICINE

## 2017-07-14 PROCEDURE — 80305 DRUG TEST PRSMV DIR OPT OBS: CPT | Performed by: PAIN MEDICINE

## 2017-07-14 RX ORDER — MIRTAZAPINE 15 MG/1
7.5 TABLET, ORALLY DISINTEGRATING ORAL NIGHTLY
COMMUNITY
End: 2017-10-02

## 2017-07-14 RX ORDER — CYCLOBENZAPRINE HCL 5 MG
5 TABLET ORAL 2 TIMES DAILY PRN
Qty: 60 TABLET | Refills: 1 | Status: SHIPPED | OUTPATIENT
Start: 2017-07-14 | End: 2017-11-21 | Stop reason: SDUPTHER

## 2017-07-14 NOTE — PATIENT INSTRUCTIONS
Facet Joint Block  The facet joints connect the bones of the spine (vertebrae). They make it possible for you to bend, twist, and make other movements with your spine. They also prevent you from overbending, overtwisting, and making other excessive movements.   A facet joint block is a procedure where a numbing medicine (anesthetic) is injected into a facet joint. Often, a type of anti-inflammatory medicine called a steroid is also injected. A facet joint block may be done for two reasons:   · Diagnosis. A facet joint block may be done as a test to see whether neck or back pain is caused by a worn-down or infected facet joint. If the pain gets better after a facet joint block, it means the pain is probably coming from the facet joint. If the pain does not get better, it means the pain is probably not coming from the facet joint.    · Therapy. A facet joint block may be done to relieve neck or back pain caused by a facet joint. A facet joint block is only done as a therapy if the pain does not improve with medicine, exercise programs, physical therapy, and other forms of pain management.  LET YOUR HEALTH CARE PROVIDER KNOW ABOUT:   · Any allergies you have.    · All medicines you are taking, including vitamins, herbs, eyedrops, and over-the-counter medicines and creams.    · Previous problems you or members of your family have had with the use of anesthetics.    · Any blood disorders you have had.    · Other health problems you have.  RISKS AND COMPLICATIONS  Generally, having a facet joint block is safe. However, as with any procedure, complications can occur. Possible complications associated with having a facet joint block include:   · Bleeding.    · Injury to a nerve near the injection site.    · Pain at the injection site.    · Weakness or numbness in areas controlled by nerves near the injection site.    · Infection.    · Temporary fluid retention.    · Allergic reaction to anesthetics or medicines used during  the procedure.  BEFORE THE PROCEDURE   · Follow your health care provider's instructions if you are taking dietary supplements or medicines. You may need to stop taking them or reduce your dosage.    · Do not take any new dietary supplements or medicines without asking your health care provider first.    · Follow your health care provider's instructions about eating and drinking before the procedure. You may need to stop eating and drinking several hours before the procedure.    · Arrange to have an adult drive you home after the procedure.  PROCEDURE  · You may need to remove your clothing and dress in an open-back gown so that your health care provider can access your spine.    · The procedure will be done while you are lying on an X-ray table. Most of the time you will be asked to lie on your stomach, but you may be asked to lie in a different position if an injection will be made in your neck.    · Special machines will be used to monitor your oxygen levels, heart rate, and blood pressure.    · If an injection will be made in your neck, an intravenous (IV) tube will be inserted into one of your veins. Fluids and medicine will flow directly into your body through the IV tube.    · The area over the facet joint where the injection will be made will be cleaned with an antiseptic soap. The surrounding skin will be covered with sterile drapes.    · An anesthetic will be applied to your skin to make the injection area numb. You may feel a temporary stinging or burning sensation.    · A video X-ray machine will be used to locate the joint. A contrast dye may be injected into the facet joint area to help with locating the joint.    · When the joint is located, an anesthetic medicine will be injected into the joint through the needle.    · Your health care provider will ask you whether you feel pain relief. If you do feel relief, a steroid may be injected to provide pain relief for a longer period of time. If you do not  feel relief or feel only partial relief, additional injections of an anesthetic may be made in other facet joints.    · The needle will be removed, the skin will be cleansed, and bandages will be applied.    AFTER THE PROCEDURE   · You will be observed for 15-30 minutes before being allowed to go home. Do not drive. Have an adult drive you or take a taxi or public transportation instead.    · If you feel pain relief, the pain will return in several hours or days when the anesthetic wears off.    · You may feel pain relief 2-14 days after the procedure. The amount of time this relief lasts varies from person to person.    · It is normal to feel some tenderness over the injected area(s) for 2 days following the procedure.    · If you have diabetes, you may have a temporary increase in blood sugar.      This information is not intended to replace advice given to you by your health care provider. Make sure you discuss any questions you have with your health care provider.     Document Released: 05/08/2008 Document Revised: 01/08/2016 Document Reviewed: 09/12/2016  [employer] Interactive Patient Education ©2017 [employer]      Facet Syndrome  Facet syndrome is a condition where injury to the small joints between the bones in the spine (facet joints) causes back pain. Over rotation (twisting) or arching (extension) of the back may injure the joints or the soft disks between the spinal bones. Such injuries result in excessive motion of the facet joint. This causes the cartilage covering the facet joint to wear down. That places pressure on nerves, as they exit the spinal cord.   SYMPTOMS   · Chronic dull ache in the low back, that gets worse with over-extension and rotation.  · Pain in the low back, buttocks, hip, and sometimes leg.  · Sometimes, stiffness of the low back.  CAUSES   Facet syndrome is often caused by repeated or over rotation, over-extension, or extension with rotation of the back. These motions cause  injury to the cartilage covering the facet joints. This places pressure on the spinal nerves.  RISK INCREASES WITH:  · Sports that can cause over-extension of the back, with rotation or repeatedly (golf, football, gymnastics, diving, weight-lifting, dancing, rifle shooting, wrestling, tennis, swimming, volleyball, track and field, rugby, other contact sports).  · Poor back strength and flexibility.  · Poor exercise technique.  PREVENTION   · Learn and use proper technique.  · Warm up and stretch properly before activity.  · Maintain physical fitness:    Back and hamstring flexibility.    Back muscle strength and endurance.    Cardiovascular fitness.  PROGNOSIS   This condition is often resolved with proper non-surgical treatment.   RELATED COMPLICATIONS   · Recurring symptoms, resulting in a chronic problem.  · Delayed healing, especially if sports are resumed too soon.  · Prolonged impairment.  · Narrowed canal for the spinal cord, due to bone spurs (bumps) resulting from chronic erosion of the facet joints (spinal stenosis).  TREATMENT   Treatment first involves stopping activities that aggravate your symptoms. Ice and medicines may be used to reduce pain and inflammation. Your caregiver may advise strength and stretching activities, to be completed at home or with a therapist. You may be referred to a physical therapist for further treatment, including: ultrasound, manual adjustments, transcutaneous electronic nerve stimulation (TENS). Surgery is rarely needed. It is reserved for athletes with persistent pain, despite 6 to 12 months of proper non-surgical treatment. Surgery involves joining (fusing) two bones of the spinal column, to stop motion between the facet joint and disk.  MEDICATION   · If pain medicine is needed, nonsteroidal anti-inflammatory medicines (aspirin and ibuprofen), or other minor pain relievers (acetaminophen), are often advised.  · Do not take pain medicine for 7 days before  surgery.  · Stronger pain relievers may be prescribed. Use only as directed and only as much as you need.  HEAT AND COLD  · Cold treatment (icing) relieves pain and reduces inflammation. Cold treatment should be applied for 10 to 15 minutes every 2 to 3 hours, and immediately after activity that aggravates your symptoms. Use ice packs or an ice massage.  · Heat treatment may be used before performing stretching and strengthening activities advised by your caregiver, physical therapist, or . Use a heat pack or a warm water soak.  SEEK MEDICAL CARE IF:   · Symptoms get worse or do not improve in 2 to 4 weeks, despite treatment.  · You develop numbness, weakness, or loss of bladder or bowel function.  · New, unexplained symptoms develop. (Drugs used in treatment may produce side effects.)     This information is not intended to replace advice given to you by your health care provider. Make sure you discuss any questions you have with your health care provider.     Document Released: 12/18/2006 Document Revised: 03/11/2013 Document Reviewed: 08/29/2016  [employer] Interactive Patient Education ©2017 [employer]

## 2017-07-26 ENCOUNTER — OUTSIDE FACILITY SERVICE (OUTPATIENT)
Dept: PAIN MEDICINE | Facility: CLINIC | Age: 52
End: 2017-07-26

## 2017-07-26 ENCOUNTER — DOCUMENTATION (OUTPATIENT)
Dept: PAIN MEDICINE | Facility: CLINIC | Age: 52
End: 2017-07-26

## 2017-07-26 PROCEDURE — 64494 INJ PARAVERT F JNT L/S 2 LEV: CPT | Performed by: PAIN MEDICINE

## 2017-07-26 PROCEDURE — 64495 INJ PARAVERT F JNT L/S 3 LEV: CPT | Performed by: PAIN MEDICINE

## 2017-07-26 PROCEDURE — 64493 INJ PARAVERT F JNT L/S 1 LEV: CPT | Performed by: PAIN MEDICINE

## 2017-07-26 NOTE — PROGRESS NOTES
Bilateral L3-S1 Lumbar Medial Branch Blockade  [facility]     PREOPERATIVE DIAGNOSIS: Lumbar spondylosis without myelopathy     POSTOPERATIVE DIAGNOSIS: Lumbar spondylosis without myelopathy     PROCEDURE: Diagnostic Bilateral Lumbar Medial Branch Nerve Blockades, with fluoroscopy: L3, L4, and L5 nerves (at the L4 & L5 transverse processes and the sacral alar groove) along with the sacral lateral branch of S1 nerve root.   1. 42659-55 -- Bilateral Lumbar Facet blocks, 1st Level        2. 48684-35 -- Bilateral Lumbar Facet blocks, 2nd Level        3.  86307-94 -- Bilateral Lumbar facet blocks, 3rd level     PRE-PROCEDURE DISCUSSION WITH PATIENT:   Risks and complications were discussed with the patient prior to starting the procedure and informed consent was obtained.      SURGEON: Bekah Sandy MD      REASON FOR PROCEDURE: Pateint is accompanied by a  and her son and gives them permission to stay during the visit. She speaks [place]nian. Started about 3 years ago, actually preceded by low back pain, no known accident.   Evaluated by [facility] by Dr. Jackson and Dr. Redd who both stated no surgery is needed. Her pain is likely from her lumbar DDD. Underwent 3 LESI at [facility] with no benefit. EMG: which was WNL. Referred here for possibly of injections.   Has stopped her statin for 2 weeks thinking it might be a source of her pain- no relief yet. Had BLE dopplers performed to look for vascular insufficiency was WNL.  The patient describes this pain as constant squeezing, pressure, ache.  The pain is located in Low Back and radiates into posterior aspect of bilateral lower legs to ankles with no radiation into feet. This painful problem is aggravated by lifting and standing,walking,sitting > 30 minutes,getting up from sitting,lying and is alleviated by elevating legs.     SEDATION: Versed 2mg & Fentanyl 100 mcg IV  ANESTHETIC: lidocaine 1% for skin infiltration, Marcaine 0.25% for  injection  STEROID: Depomedrol 80 mg  TOTAL VOLUME OF SOLUTION: 16ml     DESCRIPTON OF PROCEDURE:  After obtaining informed consent, IV access was obtained in the preoperative area. The patient was taken to the operating room. The patient was placed in the prone position with a pillow under the abdomen. All pressure points were well padded. EKG, blood pressure, and pulse oximeter were monitored. The patient was monitored and sedated by the RN under my direction. The lumbosacral area was prepped with Chloraprep and draped in a sterile fashion. Under fluoroscopic guidance the transverse processes of the L4 and L5 vertebrae at the junctions of the superior articular processes were identified on the right. Also identified was the groove between the ala and the superior articular process of the sacrum on the ipsilateral side along with the superior lateral boarder of the posterior sacral foramen. Skin and subcutaneous tissue were anesthetized with 1% lidocaine above each of these points. A 22-gauge 3.5 -inch spinal needle was introduced under fluoroscopic guidance at the above junctions. Aspiration was negative for blood and CSF. After confirming the position of the needle with fluoroscope in all views, a total of 2 mL of the anesthetic solution noted above was injected at each of these points. Needles were removed intact from each of the areas. A similar procedure was repeated to block the same nerves on the contralateral side. Onset of analgesia was noted. Vital signs remained stable throughout.    ESTIMATED BLOOD LOSS: <5 mL  SPECIMENS: none     COMPLICATIONS: none     TOLERANCE & DISCHARGE CONDITION:   The patient tolerated the procedure well. The patient was transported to the recovery area without difficulties. The patient was discharged to home under the care of family in stable and satisfactory condition.     PLAN OF CARE:  1. The patient was given our standard instruction sheet.  2. We discussed that Lumbar  Medial Branch Blockade is a diagnostic procedure in consideration for radiofrequency ablation if two diagnostic procedures prove to be positive for significant benefit. If sustained relief of 6 to eight weeks is obtained, then an alternative plan could be therapeutic lumbar branch blockades.  3. The patient is asked to keep a pain log each hour for 8 hours after the procedure today.  4. The patient will  return for second injection approximately one month.  5. The patient will resume all medications as per the medication reconciliation sheet.

## 2017-09-13 ENCOUNTER — DOCUMENTATION (OUTPATIENT)
Dept: PAIN MEDICINE | Facility: CLINIC | Age: 52
End: 2017-09-13

## 2017-09-13 ENCOUNTER — OUTSIDE FACILITY SERVICE (OUTPATIENT)
Dept: PAIN MEDICINE | Facility: CLINIC | Age: 52
End: 2017-09-13

## 2017-09-13 PROCEDURE — 64495 INJ PARAVERT F JNT L/S 3 LEV: CPT | Performed by: PAIN MEDICINE

## 2017-09-13 PROCEDURE — 64493 INJ PARAVERT F JNT L/S 1 LEV: CPT | Performed by: PAIN MEDICINE

## 2017-09-13 PROCEDURE — 64494 INJ PARAVERT F JNT L/S 2 LEV: CPT | Performed by: PAIN MEDICINE

## 2017-09-13 NOTE — PROGRESS NOTES
Bilateral L3-S1 Lumbar Medial Branch Blockade  [facility]     PREOPERATIVE DIAGNOSIS: Lumbar spondylosis without myelopathy     POSTOPERATIVE DIAGNOSIS: Lumbar spondylosis without myelopathy     PROCEDURE: Diagnostic Bilateral Lumbar Medial Branch Nerve Blockades, with fluoroscopy: L3, L4, and L5 nerves (at the L4 & L5 transverse processes and the sacral alar groove) along with the sacral lateral branch of S1 nerve root.   1. 27529-46 -- Bilateral Lumbar Facet blocks, 1st Level        2. [id number] -- Bilateral Lumbar Facet blocks, 2nd Level        3.  [id number] -- Bilateral Lumbar facet blocks, 3rd level     PRE-PROCEDURE DISCUSSION WITH PATIENT:   Risks and complications were discussed with the patient prior to starting the procedure and informed consent was obtained.      SURGEON: Bekah Sandy MD      REASON FOR PROCEDURE: Started about 3 years ago, actually preceded by low back pain, no known accident.   Evaluated by EDU[facility]O by Dr. Jackson and Dr. Redd who both stated no surgery is needed. Her pain is likely from her lumbar DDD. Underwent 3 LESI at [facility] with no benefit. EMG: which was WNL. Referred here for possibly of facet injections. The patient describes this pain as constant squeezing, pressure, ache.  The pain is located in Low Back and radiates into posterior aspect of bilateral lower legs to ankles with no radiation into feet. This painful problem is aggravated by lifting and standing,walking,sitting > 30 minutes,getting up from sitting,lying and is alleviated by elevating legs.     SEDATION: Versed 2mg & Fentanyl 100 mcg IV  ANESTHETIC: lidocaine 1% for skin infiltration, Marcaine 0.25% for injection  STEROID: Depomedrol 80 mg  TOTAL VOLUME OF SOLUTION: 16ml     DESCRIPTON OF PROCEDURE:  After obtaining informed consent, IV access was obtained in the preoperative area. The patient was taken to the operating room. The patient was placed in the prone position with a pillow  under the abdomen. All pressure points were well padded. EKG, blood pressure, and pulse oximeter were monitored. The patient was monitored and sedated by the RN under my direction. The lumbosacral area was prepped with Chloraprep and draped in a sterile fashion. Under fluoroscopic guidance the transverse processes of the L4 and L5 vertebrae at the junctions of the superior articular processes were identified on the right. Also identified was the groove between the ala and the superior articular process of the sacrum on the ipsilateral side along with the superior lateral boarder of the posterior sacral foramen. Skin and subcutaneous tissue were anesthetized with 1% lidocaine above each of these points. A 22-gauge 3.5  -inch spinal needle was introduced under fluoroscopic guidance at the above junctions. Aspiration was negative for blood and CSF. After confirming the position of the needle with fluoroscope in all views, a total of 2 mL of the anesthetic solution noted above was injected at each of these points. Needles were removed intact from each of the areas. A similar procedure was repeated to block the same nerves on the contralateral side. Onset of analgesia was noted. Vital signs remained stable throughout.    ESTIMATED BLOOD LOSS: <5 mL  SPECIMENS: none     COMPLICATIONS: none     TOLERANCE & DISCHARGE CONDITION:   The patient tolerated the procedure well. The patient was transported to the recovery area without difficulties. The patient was discharged to home under the care of family in stable and satisfactory condition.     PLAN OF CARE:  1. The patient was given our standard instruction sheet.  2. We discussed that Lumbar Medial Branch Blockade is a diagnostic procedure in consideration for radiofrequency ablation if two diagnostic procedures prove to be positive for significant benefit. If sustained relief of 6 to eight weeks is obtained, then an alternative plan could be therapeutic lumbar branch  blockades.  3. The patient is asked to keep a pain log each hour for 8 hours after the procedure today.  4. The patient will Return to clinic 3-4 wks..  5. The patient will resume all medications as per the medication reconciliation sheet.

## 2017-10-02 ENCOUNTER — OFFICE VISIT (OUTPATIENT)
Dept: PAIN MEDICINE | Facility: CLINIC | Age: 52
End: 2017-10-02

## 2017-10-02 VITALS
OXYGEN SATURATION: 95 % | TEMPERATURE: 98 F | BODY MASS INDEX: 30.59 KG/M2 | HEIGHT: 65 IN | HEART RATE: 80 BPM | WEIGHT: 183.6 LBS | RESPIRATION RATE: 18 BRPM | DIASTOLIC BLOOD PRESSURE: 94 MMHG | SYSTOLIC BLOOD PRESSURE: 153 MMHG

## 2017-10-02 DIAGNOSIS — M54.41 CHRONIC BILATERAL LOW BACK PAIN WITH BILATERAL SCIATICA: ICD-10-CM

## 2017-10-02 DIAGNOSIS — G89.29 CHRONIC BILATERAL LOW BACK PAIN WITH BILATERAL SCIATICA: ICD-10-CM

## 2017-10-02 DIAGNOSIS — M54.42 CHRONIC BILATERAL LOW BACK PAIN WITH BILATERAL SCIATICA: ICD-10-CM

## 2017-10-02 DIAGNOSIS — M51.36 DDD (DEGENERATIVE DISC DISEASE), LUMBAR: ICD-10-CM

## 2017-10-02 DIAGNOSIS — M43.06 LUMBAR SPONDYLOLYSIS: Primary | ICD-10-CM

## 2017-10-02 PROCEDURE — 99213 OFFICE O/P EST LOW 20 MIN: CPT | Performed by: PAIN MEDICINE

## 2017-10-02 RX ORDER — CITALOPRAM 40 MG/1
40 TABLET ORAL DAILY
COMMUNITY
Start: 2017-09-25

## 2017-10-02 RX ORDER — LORAZEPAM 0.5 MG/1
0.5 TABLET ORAL EVERY 8 HOURS PRN
COMMUNITY
Start: 2017-06-30 | End: 2018-11-30

## 2017-10-02 RX ORDER — MIRTAZAPINE 7.5 MG/1
7.5 TABLET, FILM COATED ORAL NIGHTLY
COMMUNITY
Start: 2017-09-25 | End: 2021-03-18

## 2017-10-02 RX ORDER — AMITRIPTYLINE HYDROCHLORIDE 100 MG/1
100 TABLET, FILM COATED ORAL NIGHTLY
COMMUNITY
Start: 2017-09-25 | End: 2021-03-18 | Stop reason: ALTCHOICE

## 2017-10-02 NOTE — PROGRESS NOTES
CHIEF COMPLAINT: Back Pain    HPI  Braydon Soria is a 51 y.o. female.  She is here to follow up for Back Pain  .  Braydon Soria is a 51 y.o. female  who presents to the office for follow-up.  She completed a Bilateral L3-S1 Lumbar Medial Branch Blockade on 7/26/17 and 9/13/17 performed for management of back pain. Patient reports 30% relief from the 1st procedure and 30% ongoing relief from the 2nd  procedure. Since last visit their pain has improved. Low back has improved from injection. Doesn't feel as tight. No BLE pain for 7-10 days with BLE pain gradually returning.  Sleeping better. Able to walk better. No more bilateral calf cramps since starting flexeril qhs.   Diclofenac 2/week- minimal help.     The patient states their pain is a 6 on a scale of 1-10.  The patient describes this pain as constant dull and ache.  The pain is located in Low Back and radiates to entire aspect of BLE down to feet. This painful problem is aggravated by physical activity and lifting and is alleviated by relaxation, pain medication and injection.    Past pain medications:   Norco 5/325 mg - states she never took but filled on GEORGE  mobic - not sure if it helped     Current pain medications:   none     Past therapies:  Physical Therapy: yes - minimal beneift  Chiropractor: yes  Massage Therapy: yes  TENS: yes  Neck or back surgery: no  Past pain management: yes    Previous Injection: Bilateral L3-S1 Lumbar Medial Branch Blockade on 7/26/17  Effect of Injection (%): 10%  Length of Relief: 1 week    Previous Injection: Bilateral L3-S1 Lumbar Medial Branch Blockade on 9/13/17  Effect of Injection (%): 30%  Length of Relief: ongoing     PEG Assessment   What number best describes your pain on average in the past week? 5  What number best describes how, during the past week, pain has interfered with your enjoyment of life? 8  What number best describes how, during the past week, pain has interfered with your general activity?  8      Current Outpatient Prescriptions:   •  amitriptyline (ELAVIL) 100 MG tablet, , Disp: , Rfl:   •  amLODIPine (NORVASC) 5 MG tablet, Take 5 mg by mouth Daily., Disp: , Rfl: 1  •  atorvastatin (LIPITOR) 20 MG tablet, Take 20 mg by mouth Daily., Disp: , Rfl: 3  •  citalopram (CeleXA) 40 MG tablet, , Disp: , Rfl:   •  cyclobenzaprine (FLEXERIL) 5 MG tablet, Take 1 tablet by mouth 2 (Two) Times a Day As Needed for Muscle Spasms (pain)., Disp: 60 tablet, Rfl: 1  •  diclofenac (VOLTAREN) 50 MG EC tablet, Take 1 tablet by mouth 2 (Two) Times a Day As Needed (pain)., Disp: 60 tablet, Rfl: 1  •  HYDROcodone-acetaminophen (NORCO) 5-325 MG per tablet, 1-2 by mouth every four hours as needed for pain (Patient taking differently: Take 1 tablet by mouth Every 6 (Six) Hours As Needed. 1-2 by mouth every four hours as needed for pain), Disp: 40 tablet, Rfl: 0  •  LORazepam (ATIVAN) 0.5 MG tablet, , Disp: , Rfl:   •  mirtazapine (REMERON) 7.5 MG tablet, , Disp: , Rfl:   •  prazosin (MINIPRESS) 5 MG capsule, , Disp: , Rfl:   •  TOPROL XL 50 MG 24 hr tablet, Take 50 mg by mouth Daily., Disp: , Rfl: 11    IMAGING  Lumbar xray - 11/2016:  FINDINGS: The vertebral height and disc spaces are well-maintained.  There is some very minimal anterior spurring at L1-2 and L2-3. No  subluxation occurs with flexion or extension.     Lumbar MRI - 1/2016:      PFSH:  The following portions of the patient's history were reviewed and updated as appropriate: problem list, past medical history, past surgery history, social history, family history, medications, and allergies    Review of Systems   Constitutional: Positive for fatigue.   HENT: Negative for congestion.    Eyes: Negative for visual disturbance.   Respiratory: Positive for shortness of breath. Negative for cough and wheezing.    Cardiovascular: Positive for palpitations. Negative for chest pain and leg swelling.   Gastrointestinal: Negative for constipation and diarrhea.   Genitourinary:  "Positive for difficulty urinating.   Musculoskeletal: Positive for back pain.   Neurological: Positive for weakness and numbness (bilateral hands).   Psychiatric/Behavioral: Positive for sleep disturbance. Negative for suicidal ideas. The patient is nervous/anxious.        Vitals:    10/02/17 1406   BP: 153/94   Pulse: 80   Resp: 18   Temp: 98 °F (36.7 °C)   SpO2: 95%   Weight: 183 lb 9.6 oz (83.3 kg)   Height: 65\" (165.1 cm)   PainSc:   6   PainLoc: Back       Physical Exam   Constitutional: She appears well-developed and well-nourished. No distress.   HENT:   Head: Normocephalic and atraumatic.   Nose: Nose normal.   Mouth/Throat: Oropharynx is clear and moist.   Eyes: Conjunctivae and EOM are normal.   Neck: Normal range of motion. Neck supple.   Pulmonary/Chest: Effort normal. No stridor. No respiratory distress.   Musculoskeletal:        Lumbar back: She exhibits decreased range of motion, tenderness and pain.        Right lower leg: She exhibits no tenderness, no swelling and no deformity.        Left lower leg: She exhibits no tenderness, no swelling and no deformity.        Right foot: There is normal range of motion and no tenderness.        Left foot: There is normal range of motion and no tenderness.   +bilateral lumbar facet tenderness  +bilateral lumbar facet loading    Neurological: She is alert. She has normal strength. No cranial nerve deficit or sensory deficit.   Skin: Skin is warm and dry. No rash noted. She is not diaphoretic.   Psychiatric: She has a normal mood and affect. Her speech is normal and behavior is normal.   Nursing note and vitals reviewed.    Ortho Exam  Neurologic Exam     Mental Status   Speech: speech is normal     Cranial Nerves     CN III, IV, VI   Extraocular motions are normal.     Motor Exam     Strength   Strength 5/5 throughout.       Lab Results   Component Value Date    POCMETH Negative 07/14/2017    POCAMPHET Negative 07/14/2017    POCBARBITUR Negative 07/14/2017    " POCBENZO Negative 07/14/2017    POCCOCAINE Negative 07/14/2017    POCMETHADO Negative 07/14/2017    POCOPIATES Negative 07/14/2017    POCOXYCODO Negative 07/14/2017    POCPHENCYC Negative 07/14/2017    POCPROPOXY Negative 07/14/2017    POCTHC Negative 07/14/2017    POCTRICYC Negative 07/14/2017     Last UDS results reviewed: 10/03/17   Last UDS: 7/14/17  Comments: Consistent     Date of last GEORGE reviewed : 10/03/17   Comments: Consistent     Assessment/Plan   Braydon was seen today for back pain.    Diagnoses and all orders for this visit:    Lumbar spondylolysis  -     Case Request    DDD (degenerative disc disease), lumbar  -     Case Request    Chronic bilateral low back pain with bilateral sciatica  -     Case Request      Requested Prescriptions      No prescriptions requested or ordered in this encounter     - Given good benefit from two previous diagnosistic lumbar medial branch blocks, would likely receive longer pain relief with radiofrequency ablation of the lumbar medial branch nerves. Discussed with the patient regarding the etiology of their pain. Informed them that they would likely benefit from a bilateral radiofrequency ablation of the lumbar medial branch nerves of L4, L5 and lateral branch of S1.  The procedure was described in detail and the risks, benefits and alternatives were discussed with the patient (including but not limited to: bleeding, infection, nerve damage, worsening of pain, inability to perform injection, paralysis, seizures, and death) who agreed to proceed.  Will perform under sedation. Instructed not to eat and have a .    - CONTINUE flexeril 5 mg bid prn pain. No refills needed today.    - CONTINUE diclofenac 50 mg bid prn pain. No refills needed today.    - Side effects of NSAIDS explained as including but not limited to upset stomach, stomach ulcers, bleeding, kidney failure, fluid retention or high blood pressure.    - Doubtful that all her pain will be relieved with  lumbar RFA. BLE pain was improved with injection so RFA is indicated and should help with BLE pain. She has underwent extensive work up for leg pain and it all has been negative.   - Continue home exercise program.     - Request me sending notes to her disability . She will give name and number to check out and we will fax over my note.     Wt Readings from Last 3 Encounters:   10/02/17 183 lb 9.6 oz (83.3 kg)   07/14/17 185 lb (83.9 kg)   06/26/17 180 lb (81.6 kg)     Body mass index is 30.55 kg/(m^2). Patient counseled on the importance of weight loss to help with overall health and pain control. Patient instructed to attempt weight loss.   Plan: Calorie counting  increase physical activity, reduce portion size, cut out extra servings and reduce fast food intake    Follow-up in 2 months.    Bekah Sandy MD  Pain Management

## 2017-10-02 NOTE — PATIENT INSTRUCTIONS
Radiofrequency Lesioning  Radiofrequency lesioning is a procedure that is performed to relieve pain. The procedure is often used for back, neck, or arm pain. Radiofrequency lesioning involves the use of a machine that creates radio waves to make heat. During the procedure, the heat is applied to the nerve that carries the pain signal. The heat damages the nerve and interferes with the pain signal. Pain relief usually starts about 2 weeks after the procedure and lasts for 6 months to 1 year.  LET YOUR HEALTH CARE PROVIDER KNOW ABOUT:  · Any allergies you have.  · All medicines you are taking, including vitamins, herbs, eye drops, creams, and over-the-counter medicines.  · Previous problems you or members of your family have had with the use of anesthetics.  · Any blood disorders you have.  · Previous surgeries you have had.  · Any medical conditions you have.  · Whether you are pregnant or may be pregnant.  RISKS AND COMPLICATIONS  Generally, this is a safe procedure. However, problems may occur, including:  · Pain or soreness at the injection site.  · Infection at the injection site.  · Damage to nerves or blood vessels.  BEFORE THE PROCEDURE  · Ask your health care provider about:    Changing or stopping your regular medicines. This is especially important if you are taking diabetes medicines or blood thinners.    Taking medicines such as aspirin and ibuprofen. These medicines can thin your blood. Do not take these medicines before your procedure if your health care provider instructs you not to.  · Follow instructions from your health care provider about eating or drinking restrictions.  · Plan to have someone take you home after the procedure.  · If you go home right after the procedure, plan to have someone with you for 24 hours.  PROCEDURE  · You will be given one or more of the following:    A medicine to help you relax (sedative).    A medicine to numb the area (local anesthetic).  · You will be awake during  the procedure. You will need to be able to talk with the health care provider during the procedure.  · With the help of a type of X-ray (fluoroscopy), the health care provider will insert a radiofrequency needle into the area to be treated.  · Next, a wire that carries the radio waves (electrode) will be put through the radiofrequency needle. An electrical pulse will be sent through the electrode to verify the correct nerve. You will feel a tingling sensation, and you may have muscle twitching.  · Then, the tissue that is around the needle tip will be heated by an electric current that is passed using the radiofrequency machine. This will numb the nerves.  · A bandage (dressing) will be put on the insertion area after the procedure is done.  The procedure may vary among health care providers and hospitals.  AFTER THE PROCEDURE  · Your blood pressure, heart rate, breathing rate, and blood oxygen level will be monitored often until the medicines you were given have worn off.  · Return to your normal activities as directed by your health care provider.     This information is not intended to replace advice given to you by your health care provider. Make sure you discuss any questions you have with your health care provider.     Document Released: 08/15/2012 Document Revised: 04/10/2017 Document Reviewed: 01/25/2016  [employer] Interactive Patient Education ©2017 [employer]

## 2017-10-04 ENCOUNTER — TRANSCRIBE ORDERS (OUTPATIENT)
Dept: ADMINISTRATIVE | Facility: HOSPITAL | Age: 52
End: 2017-10-04

## 2017-10-04 DIAGNOSIS — Z12.31 VISIT FOR SCREENING MAMMOGRAM: Primary | ICD-10-CM

## 2017-10-09 ENCOUNTER — TRANSCRIBE ORDERS (OUTPATIENT)
Dept: ADMINISTRATIVE | Facility: HOSPITAL | Age: 52
End: 2017-10-09

## 2017-10-09 ENCOUNTER — HOSPITAL ENCOUNTER (OUTPATIENT)
Dept: CARDIOLOGY | Facility: HOSPITAL | Age: 52
Discharge: HOME | End: 2017-10-09
Attending: SPECIALIST | Admitting: SPECIALIST

## 2017-10-09 DIAGNOSIS — R00.0 TACHYCARDIA: Primary | ICD-10-CM

## 2017-10-09 DIAGNOSIS — R00.0 TACHYCARDIA: ICD-10-CM

## 2017-10-09 PROCEDURE — 93005 ELECTROCARDIOGRAM TRACING: CPT | Performed by: SPECIALIST

## 2017-10-09 PROCEDURE — 93010 ELECTROCARDIOGRAM REPORT: CPT | Performed by: INTERNAL MEDICINE

## 2017-10-20 ENCOUNTER — HOSPITAL ENCOUNTER (OUTPATIENT)
Dept: MAMMOGRAPHY | Facility: HOSPITAL | Age: 52
Discharge: HOME | End: 2017-10-20
Attending: OBSTETRICS & GYNECOLOGY | Admitting: OBSTETRICS & GYNECOLOGY

## 2017-10-20 DIAGNOSIS — Z12.31 VISIT FOR SCREENING MAMMOGRAM: ICD-10-CM

## 2017-10-20 PROCEDURE — G0202 SCR MAMMO BI INCL CAD: HCPCS

## 2017-10-20 PROCEDURE — 77063 BREAST TOMOSYNTHESIS BI: CPT

## 2017-11-21 DIAGNOSIS — M54.42 CHRONIC BILATERAL LOW BACK PAIN WITH BILATERAL SCIATICA: ICD-10-CM

## 2017-11-21 DIAGNOSIS — M54.41 CHRONIC BILATERAL LOW BACK PAIN WITH BILATERAL SCIATICA: ICD-10-CM

## 2017-11-21 DIAGNOSIS — G89.29 CHRONIC BILATERAL LOW BACK PAIN WITH BILATERAL SCIATICA: ICD-10-CM

## 2017-11-21 DIAGNOSIS — M43.06 LUMBAR SPONDYLOLYSIS: ICD-10-CM

## 2017-11-21 RX ORDER — CYCLOBENZAPRINE HCL 5 MG
TABLET ORAL
Qty: 60 TABLET | Refills: 1 | Status: SHIPPED | OUTPATIENT
Start: 2017-11-21 | End: 2021-03-18

## 2017-12-13 ENCOUNTER — DOCUMENTATION (OUTPATIENT)
Dept: PAIN MEDICINE | Facility: CLINIC | Age: 52
End: 2017-12-13

## 2017-12-13 ENCOUNTER — OUTSIDE FACILITY SERVICE (OUTPATIENT)
Dept: PAIN MEDICINE | Facility: CLINIC | Age: 52
End: 2017-12-13

## 2017-12-13 PROCEDURE — 64635 DESTROY LUMB/SAC FACET JNT: CPT | Performed by: PAIN MEDICINE

## 2017-12-13 PROCEDURE — 99152 MOD SED SAME PHYS/QHP 5/>YRS: CPT | Performed by: PAIN MEDICINE

## 2017-12-13 PROCEDURE — 64636 DESTROY L/S FACET JNT ADDL: CPT | Performed by: PAIN MEDICINE

## 2017-12-13 NOTE — PROGRESS NOTES
Bilateral L4-S1 Medial and Dorsal Branch Radiofrequency Lesioning  [facility]   PREOPERATIVE DIAGNOSIS:  Sacroiliac Dysfunction, bilaterally.  Lumbar spondylosis without myelopathy  POSTOPERATIVE DIAGNOSIS:  Same as above.      PROCEDURE:   Bilateral Lumbrosacral Medial Branch and Sacral Dorsal Branch Nerve Radiofrequency lesioning, with fluoroscopy:  The medial branches of L3, L4 and L5 and the Dorsal Branches of S1    PRE-PROCEDURE DISCUSSION WITH PATIENT:    Risks and complications were discussed with the patient prior to starting the procedure and informed consent was obtained.      SURGEON:  Bekah Sandy MD     REASON FOR PROCEDURE:  She completed a Bilateral L3-S1 Lumbar Medial Branch Blockade on 7/26/17 and 9/13/17 performed for management of back pain. Patient reports 30% relief from the 1st procedure and 30% ongoing relief from the 2nd  procedure.  The patient describes this pain as constant squeezing, pressure, ache.  The pain is located in Low Back and radiates into posterior aspect of bilateral lower legs to ankles with no radiation into feet. This painful problem is aggravated by lifting and standing,walking,sitting > 30 minutes,getting up from sitting,lying and is alleviated by elevating legs.    SEDATION:  Versed 2mg & Fentanyl 100 mcg IV  TIME OF PROCEDURE:  The intraoperative procedure time after administration of the sedatives was 20 minutes.    ANESTHETIC:  Lidocaine 2%  STEROID:  NONE  TOTAL VOLUME OF SOLUTION:  16      DESCRIPTON OF PROCEDURE:  After obtaining informed consent, IV access was  obtained in the preoperative area.   The patient was taken to the operating room.  The patient was placed in the prone position with a pillow under the abdomen. All pressure points were well padded.  EKG, blood pressure, and pulse oximeter were monitored.  The patient was monitored and sedated by the RN under my direction. The lumbosacral area was prepped with Chloraprep and draped  in a sterile fashion.     Under fluoroscopic guidance the transverse process of the L4, L5 vertebrae at the junctions of the superior articular processes were identified on the first side. Also identified was the groove between the ala and the superior articular process of the sacrum on the ipsilateral side.  Also identified were the points on the lateral margin of the S1 posterior foramina.  Skin and subcutaneous tissue were anesthetized with 1% lidocaine above each of these points.     A radiofrequency probe needle was introduced under fluoroscopic guidance at the above junctions in the following manner.   After confirming the position of the needle with fluoroscope in all views, testing was initiated.   Aspiration was negative for blood and CSF.  First, sensory testing was started on the needles at the L3, L4, L5 and S1 medial branches, with initiation @ 1V and 50Hz and slowly decreased until painful pressure stimulation diminished at 0.5V.  Next, motor testing was confirmed to be negative at 3V and 2Hz for any radicular stimulation.  Then 2mL of the local anesthetic was instilled in each needle.  Then, Radiofrequency Lesioning was initiated for 3 minutes at 80 degrees Celsius.  Needles were removed intact from each of the areas.     A similar procedure was repeated to block the L3, -S1 nerves on the contralateral side.   Onset of analgesia was noted.  Vital signs remained stable throughout.      ESTIMATED BLOOD LOSS:  minimal  SPECIMENS:  none    COMPLICATIONS:   No complications were noted.    TOLERANCE & DISCHARGE CONDITION:    The patient tolerated the procedure well.  The patient was transported to the recovery area without difficulties.  The patient was discharged to home under the care of family in stable and satisfactory condition.    PLAN OF CARE:  1. The patient was given our standard instruction sheet.  2. The patient is asked to keep a pain log each hour for 8 hours after the procedure today.  3. The  patient will  Return to clinic PRN  4. The patient will resume all medications as per the medication reconciliation sheet.

## 2018-01-03 ENCOUNTER — OFFICE VISIT (OUTPATIENT)
Dept: ORTHOPEDIC SURGERY | Facility: CLINIC | Age: 53
End: 2018-01-03

## 2018-01-03 VITALS — WEIGHT: 188.4 LBS | HEIGHT: 65 IN | BODY MASS INDEX: 31.39 KG/M2 | TEMPERATURE: 98 F

## 2018-01-03 DIAGNOSIS — G89.29 CHRONIC PAIN OF BOTH KNEES: Primary | ICD-10-CM

## 2018-01-03 DIAGNOSIS — S83.249A TEAR OF MEDIAL MENISCUS OF KNEE, CURRENT, UNSPECIFIED LATERALITY, UNSPECIFIED TEAR TYPE, INITIAL ENCOUNTER: ICD-10-CM

## 2018-01-03 DIAGNOSIS — M25.561 CHRONIC PAIN OF BOTH KNEES: Primary | ICD-10-CM

## 2018-01-03 DIAGNOSIS — M25.562 CHRONIC PAIN OF BOTH KNEES: Primary | ICD-10-CM

## 2018-01-03 DIAGNOSIS — M94.269 CHONDROMALACIA OF KNEE, UNSPECIFIED LATERALITY: ICD-10-CM

## 2018-01-03 PROCEDURE — 73562 X-RAY EXAM OF KNEE 3: CPT | Performed by: ORTHOPAEDIC SURGERY

## 2018-01-03 PROCEDURE — 99204 OFFICE O/P NEW MOD 45 MIN: CPT | Performed by: ORTHOPAEDIC SURGERY

## 2018-01-03 NOTE — PROGRESS NOTES
New Patient Complaint      Patient: Braydon Dobbinsjlagic  YOB: 1965 52 y.o. female  Medical Record Number: [id number]    Chief Complaints: My knees hurt     History of Present Illness: Patient describes a 1-1/2 or history of initially mild to moderate now severe intermittent aching pain with feelings of uncomfortable popping and catching to both knees worse with standing sitting driving and walking activities.  On further questioning Both knees occasionally feel as though they stuck and she has to wiggle them to get them back out straight and gets an occasional feeling of fullness and warming throughout both knees.  She has been quite active in the past doing a lot of squatting and kneeling.  Pain is exacerbated by getting up from seated position as well as with stairs and also with squatting and kneeling.    She is being followed by neurosurgery and pain management for her back and has had some physical therapy for her back and some for her knees.  She is not currently taking any anti-inflammatories.  She did have some back injections but none in her knees.      HPI    Allergies: No Known Allergies    Medications:   Current Outpatient Prescriptions on File Prior to Visit   Medication Sig   • amitriptyline (ELAVIL) 100 MG tablet Take 100 mg by mouth Every Night.   • amLODIPine (NORVASC) 5 MG tablet Take 5 mg by mouth Daily.   • citalopram (CeleXA) 40 MG tablet Take 40 mg by mouth Daily.   • cyclobenzaprine (FLEXERIL) 5 MG tablet TAKE ONE TABLET BY MOUTH TWICE DAILY AS NEEDED FOR MUSCLE SPASM (PAIN)   • LORazepam (ATIVAN) 0.5 MG tablet Take 0.5 mg by mouth Every 8 (Eight) Hours As Needed.   • mirtazapine (REMERON) 7.5 MG tablet Take 7.5 mg by mouth Every Night.   • prazosin (MINIPRESS) 5 MG capsule Take 5 mg by mouth Every Night.   • TOPROL XL 50 MG 24 hr tablet Take 50 mg by mouth Daily.   • [DISCONTINUED] atorvastatin (LIPITOR) 20 MG tablet Take 20 mg by mouth Daily.   • [DISCONTINUED] diclofenac  (VOLTAREN) 50 MG EC tablet Take 1 tablet by mouth 2 (Two) Times a Day As Needed (pain).   • [DISCONTINUED] HYDROcodone-acetaminophen (NORCO) 5-325 MG per tablet 1-2 by mouth every four hours as needed for pain (Patient taking differently: Take 1 tablet by mouth Every 6 (Six) Hours As Needed. 1-2 by mouth every four hours as needed for pain)     No current facility-administered medications on file prior to visit.        Past Medical History:   Diagnosis Date   • Anxiety    • Arthritis    • Chest pressure    • Depression    • Dyspnea    • Gallbladder disease     gallstones   • Hormone replacement therapy (HRT)    • Hypercholesterolemia    • Hyperlipidemia    • Hypertension    • Insomnia    • Low back pain    • Lumbosacral disc disease    • Migraines    • Peripheral neuropathy    • SOB (shortness of breath)    • Thyroid nodule      Past Surgical History:   Procedure Laterality Date   •  SECTION      CHILDREN WERE BORN IN [facility]   • CHOLECYSTECTOMY WITH INTRAOPERATIVE CHOLANGIOGRAM N/A 2017    Procedure: CHOLECYSTECTOMY LAPAROSCOPIC INTRAOPERATIVE CHOLANGIOGRAM;  Surgeon: Molina Smith MD;  Location: [facility] [place] [facility] [place];  Service:    • COLONOSCOPY N/A 2016    Procedure: COLONOSCOPY TO CECUM WITH HOT SNARE POLYPECTOMY AND CLIPx1;  Surgeon: Molina Smith MD;  Location: [facility] ENDOSCOPY;  Service:    • ENDOSCOPY N/A 2016    Procedure: ESOPHAGOGASTRODUODENOSCOPY;  Surgeon: Molina Smith MD;  Location: [facility] ENDOSCOPY;  Service:    • HYSTERECTOMY     • OOPHORECTOMY     • TONSILLECTOMY       Social History     Occupational History   • Not on file.     Social History Main Topics   • Smoking status: Former Smoker   • Smokeless tobacco: Never Used      Comment: smokes occasionally   • Alcohol use No   • Drug use: No   • Sexual activity: Defer      Social History     Social History Narrative     Family History   Problem Relation Age of Onset   • Stroke Mother    • Heart disease Father    •  "Heart attack Father    • Heart disease Brother    • Heart attack Brother        Review of Systems: 14 point review of systems performed, positive pertinent findings identified in HPI. All remaining systems negative Except night sweats, vision changes, dry eyes, ringing in the ears, anxiety or depression    Review of Systems      Physical Exam:   Vitals:    01/03/18 0958   Temp: 98 °F (36.7 °C)   TempSrc: Temporal Artery    Weight: 85.5 kg (188 lb 6.4 oz)   Height: 165.1 cm (65\")     Physical Exam   Constitutional: pleasant, well developed  she is with her son  Eyes: sclera non icteric  Hearing : adequate for exam  Cardiovascular: palpable pulses in bilateral feet,bilateral calf/ thigh NT without sign of DVT  Respiratoy: breathing unlabored   Neurological: grossly sensate to LT throughout bilaterl LE  Psychiatric: oriented with normal mood and affect.   Lymphatic: No palpable popliteal lymphadenopathy bilateral LE  Skin: intact throughout bilateral leg/foot  Musculoskeletal: Nonantalgic gait without assistive device.  She was able to arise and climb onto the exam table under her own control.  Both knees shows 0-115° range of motion with discomfort over the medial and posterior aspect of both knees motion of the laterally with maximum flexion.  There is mild to moderate discomfort with patellofemoral compression.  Bilateral focal pain over the medial more so than lateral joint line with Piedad's with uncomfortable popping.  Grossly stable ligamentous exam bilaterally  Physical Exam  Ortho Exam    Radiology: 3 views of both knees ordered to evaluate pain reviewed and no prior x-rays available for comparison there is some mild tricompartmental arthritic change mainly of the medial and patellofemoral joints but appreciate gross malalignment fracture or lucencies.    Assessment/Plan: Bilateral knee pain of unclear etiology.  This may be some exacerbation of patellofemoral and medial compartment arthritis given her " mechanical symptoms I reviewed with she and her son that I certainly worry about a meniscal tear and reviewed the etiology with them including use of a model.    We need to get an MRI of both knees to evaluate for meniscal tear and this will help tailor our treatment protocol.  Discussed with them that if we do not see any obvious tear treatment options may include anti-inflammatories injection bracing and physical therapy.  All the questions are answered with her full satisfaction and they understand to call to schedule follow-up on the once MRI has been scheduled.

## 2018-01-09 ENCOUNTER — LAB (OUTPATIENT)
Dept: LAB | Facility: HOSPITAL | Age: 53
End: 2018-01-09

## 2018-01-09 ENCOUNTER — TRANSCRIBE ORDERS (OUTPATIENT)
Dept: ADMINISTRATIVE | Facility: HOSPITAL | Age: 53
End: 2018-01-09

## 2018-01-09 ENCOUNTER — HOSPITAL ENCOUNTER (OUTPATIENT)
Dept: CARDIOLOGY | Facility: HOSPITAL | Age: 53
Discharge: HOME | End: 2018-01-09
Admitting: OTOLARYNGOLOGY

## 2018-01-09 DIAGNOSIS — Z01.818 PRE-OP EXAM: Primary | ICD-10-CM

## 2018-01-09 DIAGNOSIS — Z01.818 PRE-OP EXAM: ICD-10-CM

## 2018-01-09 LAB
ANION GAP SERPL CALCULATED.3IONS-SCNC: 13.6 MMOL/L
BUN BLD-MCNC: 10 MG/DL (ref 6–20)
BUN/CREAT SERPL: 15.4 (ref 7–25)
CALCIUM SPEC-SCNC: 9 MG/DL (ref 8.6–10.5)
CHLORIDE SERPL-SCNC: 101 MMOL/L (ref 98–107)
CO2 SERPL-SCNC: 26.4 MMOL/L (ref 22–29)
CREAT BLD-MCNC: 0.65 MG/DL (ref 0.57–1)
DEPRECATED RDW RBC AUTO: 46.1 FL (ref 37–54)
ERYTHROCYTE [DISTWIDTH] IN BLOOD BY AUTOMATED COUNT: 13.5 % (ref 11.7–13)
GFR SERPL CREATININE-BSD FRML MDRD: 96 ML/MIN/1.73
GLUCOSE BLD-MCNC: 112 MG/DL (ref 65–99)
HCT VFR BLD AUTO: 43 % (ref 35.6–45.5)
HGB BLD-MCNC: 13.9 G/DL (ref 11.9–15.5)
MCH RBC QN AUTO: 30.5 PG (ref 26.9–32)
MCHC RBC AUTO-ENTMCNC: 32.3 G/DL (ref 32.4–36.3)
MCV RBC AUTO: 94.3 FL (ref 80.5–98.2)
PLATELET # BLD AUTO: 299 10*3/MM3 (ref 140–500)
PMV BLD AUTO: 9.5 FL (ref 6–12)
POTASSIUM BLD-SCNC: 4 MMOL/L (ref 3.5–5.2)
RBC # BLD AUTO: 4.56 10*6/MM3 (ref 3.9–5.2)
SODIUM BLD-SCNC: 141 MMOL/L (ref 136–145)
WBC NRBC COR # BLD: 9.36 10*3/MM3 (ref 4.5–10.7)

## 2018-01-09 PROCEDURE — 36415 COLL VENOUS BLD VENIPUNCTURE: CPT

## 2018-01-09 PROCEDURE — 85027 COMPLETE CBC AUTOMATED: CPT

## 2018-01-09 PROCEDURE — 80048 BASIC METABOLIC PNL TOTAL CA: CPT

## 2018-01-09 PROCEDURE — 93005 ELECTROCARDIOGRAM TRACING: CPT | Performed by: OTOLARYNGOLOGY

## 2018-01-09 PROCEDURE — 93010 ELECTROCARDIOGRAM REPORT: CPT | Performed by: INTERNAL MEDICINE

## 2018-01-13 ENCOUNTER — HOSPITAL ENCOUNTER (OUTPATIENT)
Dept: MRI IMAGING | Facility: HOSPITAL | Age: 53
Discharge: HOME | End: 2018-01-13
Attending: ORTHOPAEDIC SURGERY | Admitting: ORTHOPAEDIC SURGERY

## 2018-01-13 ENCOUNTER — HOSPITAL ENCOUNTER (OUTPATIENT)
Dept: MRI IMAGING | Facility: HOSPITAL | Age: 53
Discharge: HOME | End: 2018-01-13
Attending: ORTHOPAEDIC SURGERY

## 2018-01-13 DIAGNOSIS — M25.561 CHRONIC PAIN OF BOTH KNEES: ICD-10-CM

## 2018-01-13 DIAGNOSIS — M94.269 CHONDROMALACIA OF KNEE, UNSPECIFIED LATERALITY: ICD-10-CM

## 2018-01-13 DIAGNOSIS — M25.562 CHRONIC PAIN OF BOTH KNEES: ICD-10-CM

## 2018-01-13 DIAGNOSIS — S83.249A TEAR OF MEDIAL MENISCUS OF KNEE, CURRENT, UNSPECIFIED LATERALITY, UNSPECIFIED TEAR TYPE, INITIAL ENCOUNTER: ICD-10-CM

## 2018-01-13 DIAGNOSIS — G89.29 CHRONIC PAIN OF BOTH KNEES: ICD-10-CM

## 2018-01-13 PROCEDURE — 73721 MRI JNT OF LWR EXTRE W/O DYE: CPT

## 2018-01-18 ENCOUNTER — TELEPHONE (OUTPATIENT)
Dept: ORTHOPEDIC SURGERY | Facility: CLINIC | Age: 53
End: 2018-01-18

## 2018-01-18 NOTE — TELEPHONE ENCOUNTER
I spoke with patient and her  about the MRI results.  I reviewed with him that I did not see anything to do from an operative standpoint but that I should see her back in the office for further exam and to determine proper treatment course.    I told him that I felt that at this appointment would be best to have a  present as I would be explain things in more medical terms and we did at her last visit and they agreed to this.

## 2018-01-29 ENCOUNTER — OFFICE VISIT (OUTPATIENT)
Dept: ORTHOPEDIC SURGERY | Facility: CLINIC | Age: 53
End: 2018-01-29

## 2018-01-29 VITALS — WEIGHT: 187.6 LBS | TEMPERATURE: 98.3 F | BODY MASS INDEX: 31.25 KG/M2 | HEIGHT: 65 IN

## 2018-01-29 DIAGNOSIS — M19.041 ARTHRITIS OF BOTH HANDS: Primary | ICD-10-CM

## 2018-01-29 DIAGNOSIS — M76.52 PATELLAR TENDONITIS OF BOTH KNEES: ICD-10-CM

## 2018-01-29 DIAGNOSIS — M19.042 ARTHRITIS OF BOTH HANDS: Primary | ICD-10-CM

## 2018-01-29 DIAGNOSIS — M76.51 PATELLAR TENDONITIS OF BOTH KNEES: ICD-10-CM

## 2018-01-29 DIAGNOSIS — M62.569 ATROPHY OF MUSCLE OF LOWER LEG, UNSPECIFIED LATERALITY: ICD-10-CM

## 2018-01-29 DIAGNOSIS — M22.40 CHONDROMALACIA OF PATELLA, UNSPECIFIED LATERALITY: ICD-10-CM

## 2018-01-29 PROCEDURE — 99214 OFFICE O/P EST MOD 30 MIN: CPT | Performed by: ORTHOPAEDIC SURGERY

## 2018-01-29 NOTE — PROGRESS NOTES
"Knee Exam      Patient: Braydon Dobbinsjlagic    YOB: 1965 52 y.o. female    Chief Complaints: knees hurt    History of Present Illness: Patient is seen back today with about a 1-1/2 year history of initially mild now moderate intermittent aching pain mainly over the anterior aspect of both knees with some uncomfortable feelings of intermittent catching.  Symptoms are worse when she goes up and down steps or arises from a seated position also with kneeling.  She's followed by pain management for her back    She also asked me look at her hands a day we start talking about arthritis she's had intermittent discomfort with some painful deformities around the PIP and DIP joints of her fingers but no redness or skin breakdown.  HPI    ROS: knee pain, no fevers or chills  Past Medical History:   Diagnosis Date   • Anxiety    • Arthritis    • Chest pressure    • Depression    • Dyspnea    • Gallbladder disease     gallstones   • Hormone replacement therapy (HRT)    • Hypercholesterolemia    • Hyperlipidemia    • Hypertension    • Insomnia    • Low back pain    • Lumbosacral disc disease    • Migraines    • Peripheral neuropathy    • SOB (shortness of breath)    • Thyroid nodule        Physical Exam:   Vitals:    01/29/18 1031   Temp: 98.3 °F (36.8 °C)   TempSrc: Temporal Artery    Weight: 85.1 kg (187 lb 9.6 oz)   Height: 165.1 cm (65\")     Well developed with normal mood.She is seen with her son present as well as  today.  She has a nonantalgic gait.  Both knees showed no warmth erythema mild effusion there was mild discomfort with patellofemoral compression as well as along the patellar tendon but no focal fluid collection.  5 out of 5 extension strength with mild discomfort.  She was nontender over the Laterally.      Radiology: MRI films and report of the left knee dated 1/13/18 reviewed and show left patellar tendinitis with chronic-appearing fatty atrophic change of the left medial gastroc soleus " and distal semimembranosus muscles more extensive than what was seen on the contralateral side.    MRI films and report of the right knee dated 1/13/18 reviewed again shows some mild increased signal within the substance of the patellar tendon and along the posterior medial and most extensively proximally edge representing tendinitis as well as fatty change involving the medial gastroc proximal soleus and distal semimembranosus muscle.      Assessment/Plan:  1.  Bilateral patellar tendinitis with clinical chondromalacia patella  2.  Bilateral chronic fatty atrophic changes to the muscles around the knee of the lower leg.    We discussed treatment options and I do not see anything to do from an operative standpoint I reviewed the etiology of this with them including use of a model.    I've given her prescription for use of Voltaren gel over the area twice daily and discussed how to use this with she and her son.  Also avoid kneeling squatting and twisting activities.    We'll also make referral to physical therapy at the hospital where she's had this previously for her back.    Regarding the multiple arthritic changes in her hand as well as these findings with her knee if he could be best that she has rheumatologic evaluation and we'll make such referral to Dr. Nydia Ro.    Also discussed unclear etiology of the atrophic changes in the muscles around the legs this is not clearly something from an orthopedic standpoint that I would manage.  I recommended evaluation by a neurologist to make sure nothing more global going on and to help determine etiology and treatment of this.    I made a referral to neurology for this as well.    She her son voiced a clear understanding of this treatment plan and I will see her back in about 8 weeks.    She states that she did not want to start physical therapy for several weeks because she is recently recovering from sinus surgery and I told her that should be fine.

## 2018-01-30 ENCOUNTER — TELEPHONE (OUTPATIENT)
Dept: ORTHOPEDIC SURGERY | Facility: CLINIC | Age: 53
End: 2018-01-30

## 2018-01-30 NOTE — TELEPHONE ENCOUNTER
I HAD TO SEND PT TO DR CHELSEA DASILVA BECAUSE HE IS ONLY RHEUMATOLOGIST THAT EXCEPTS PTS INSURANCE/DM

## 2018-02-26 ENCOUNTER — HOSPITAL ENCOUNTER (OUTPATIENT)
Dept: PHYSICAL THERAPY | Facility: HOSPITAL | Age: 53
Setting detail: THERAPIES SERIES
Discharge: HOME | End: 2018-02-26
Attending: ORTHOPAEDIC SURGERY

## 2018-02-26 DIAGNOSIS — M25.562 CHRONIC PAIN OF BOTH KNEES: Primary | ICD-10-CM

## 2018-02-26 DIAGNOSIS — M25.561 CHRONIC PAIN OF BOTH KNEES: Primary | ICD-10-CM

## 2018-02-26 DIAGNOSIS — G89.29 CHRONIC PAIN OF BOTH KNEES: Primary | ICD-10-CM

## 2018-02-26 PROCEDURE — G8979 MOBILITY GOAL STATUS: HCPCS

## 2018-02-26 PROCEDURE — 97161 PT EVAL LOW COMPLEX 20 MIN: CPT

## 2018-02-26 PROCEDURE — G8978 MOBILITY CURRENT STATUS: HCPCS

## 2018-02-26 NOTE — THERAPY EVALUATION
Outpatient Physical Therapy Ortho Initial Evaluation  [facility]     Patient Name: Braydon Soria  : 1965  MRN: [id number]  Today's Date: 2018      Visit Date: 2018    Patient Active Problem List   Diagnosis   • Midline low back pain with left-sided sciatica   • Hyperreflexia   • Incomplete bladder emptying   • Thyroid enlargement   • BP (high blood pressure)   • Chronic bilateral low back pain with bilateral sciatica   • DDD (degenerative disc disease), lumbar   • Lumbar spondylolysis   • Atrophy of muscle of lower leg   • Arthritis of both hands   • Patellar tendonitis of both knees   • Chondromalacia of patella        Past Medical History:   Diagnosis Date   • Anxiety    • Arthritis    • Chest pressure    • Depression    • Dyspnea    • Gallbladder disease     gallstones   • Hormone replacement therapy (HRT)    • Hypercholesterolemia    • Hyperlipidemia    • Hypertension    • Insomnia    • Low back pain    • Lumbosacral disc disease    • Migraines    • Peripheral neuropathy    • SOB (shortness of breath)    • Thyroid nodule         Past Surgical History:   Procedure Laterality Date   •  SECTION      CHILDREN WERE BORN IN [place]   • CHOLECYSTECTOMY WITH INTRAOPERATIVE CHOLANGIOGRAM N/A 2017    Procedure: CHOLECYSTECTOMY LAPAROSCOPIC INTRAOPERATIVE CHOLANGIOGRAM;  Surgeon: Molina Smith MD;  Location:  [facility];  Service:    • COLONOSCOPY N/A 2016    Procedure: COLONOSCOPY TO CECUM WITH HOT SNARE POLYPECTOMY AND CLIPx1;  Surgeon: Molina Smith MD;  Location: [facility] ENDOSCOPY;  Service:    • ENDOSCOPY N/A 2016    Procedure: ESOPHAGOGASTRODUODENOSCOPY;  Surgeon: Molina Smith MD;  Location: [facility] ENDOSCOPY;  Service:    • HYSTERECTOMY     • OOPHORECTOMY     • SINUS SURGERY     • TONSILLECTOMY         Visit Dx:     ICD-10-CM ICD-9-CM   1. Chronic pain of both knees M25.561 719.46    M25.562 338.29    G89.29              Patient History        02/26/18 1000          History    Chief Complaint Pain  -LS      Type of Pain Knee pain  -LS      Date Current Problem(s) Began 02/26/16  -LS      Brief Description of Current Complaint Pt reports B knee pain. She does not work. She reports initially L calf pain and posterior and anterior knee pain. She also has chronic LBP treated with [facility] with injections.  -LS      Previous treatment for THIS PROBLEM Medication  -LS      Onset Date- PT 2/26/18  -LS      Patient/Caregiver Goals Relieve pain;Return to prior level of function;Know what to do to help the symptoms  -LS      Patient's Rating of General Health Fair  -LS      Hand Dominance right-handed  -LS      Occupation/sports/leisure activities Pt does not work.  -LS      Patient seeing anyone else for problem(s)? Yes  -LS      How has patient tried to help current problem? Voltaren, medication, rest  -LS      What clinical tests have you had for this problem? MRI  -LS      History of Previous Related Injuries chronic B knee pain  -LS      Pain     Pain Location Knee  -LS      Pain at Present 6  -LS      Pain at Best 4  -LS      Pain at Worst 6  -LS      Pain Frequency Intermittent  -LS      Pain Description Sharp;Aching  -LS      What Performance Factors Make the Current Problem(s) WORSE? standing up from seated position, walking  -LS      What Performance Factors Make the Current Problem(s) BETTER? rest, sitting  -LS      Pain Comments I have to keep changing positions.  -LS      Tolerance Time- Standing 5 minutes  -LS      Tolerance Time- Sitting minimal pain  -LS      Tolerance Time- Walking 10 minutes  -LS      Tolerance Time- Lying sometimes increased pain  -LS      Is your sleep disturbed? Yes  -LS      What position do you sleep in? Supine  -LS      Difficulties at work? Pt does not work.  -LS      Difficulties with ADL's? Yes, unable to cook/clean.  -LS      Difficulties with recreational activities? Occasionally stationary bike but increased painn.   -LS      Fall Risk Assessment    Any falls in the past year: Yes  -LS      Number of falls reported in the last 12 months 1  -LS      Factors that contributed to the fall: Tripped  -LS      Services    Prior Rehab/Home Health Experiences No   on low back but not knees  -LS      Daily Activities    Primary Language English  -LS      Pt Participated in POC and Goals Yes  -LS      Safety    Are you being hurt, hit, or frightened by anyone at home or in your life? No  -LS      Are you being neglected by a caregiver No  -LS        User Key  (r) = Recorded By, (t) = Taken By, (c) = Cosigned By    Initials Name Provider Type    LS Michelle Osman, PT Physical Therapist                PT Ortho       02/26/18 1000    Subjective Comments    Subjective Comments I have pain in both knees. I can not walk, I can not stand.  -LS    Subjective Pain    Able to rate subjective pain? yes  -LS    Pre-Treatment Pain Level 6  -LS    Post-Treatment Pain Level 6  -LS    Posture/Observations    Posture/Observations Comments several instances of off balance with sit to stand and stand to sit transition  -LS    DTR- Lower Quarter Clearing    Patellar tendon (L2-4) Bilateral:;2- Normal response  -LS    Achilles tendon (S1-2) Bilateral:;2- Normal response  -LS    Sensory Screen for Light Touch- Lower Quarter Clearing    L2 (anterior mid thigh) Intact  -LS    L3 (distal anterior thigh) Intact  -LS    L4 (medial lower leg/foot) Intact  -LS    L5 (lateral lower leg/great toe) Intact  -LS    S1 (bottom of foot) Intact  -LS    Myotomal Screen- Lower Quarter Clearing    Hip flexion (L2) Right:;4 (Good);Left:;4+ (Good +)  -LS    Knee extension (L3) Bilateral:;5 (Normal)  -LS    Ankle DF (L4) Bilateral:;4+ (Good +)  -LS    Ankle PF (S1) Bilateral:;4 (Good)  -LS    Knee flexion (S2) Left:;5 (Normal);Right:;4+ (Good +)  -LS    Lumbar ROM Screen- Lower Quarter Clearing    Lumbar Flexion Normal  -LS    Lumbar Extension Impaired   painful and inc R lateral  knee pain  -LS    Lumbar Lateral Flexion Impaired   R lateral flexion inc R lateral knee pain  -LS    Lumbar Rotation Impaired   75% of normal  -LS    Lumbar/SI Special Tests    Slump Test (Neural Tension) Negative   tightness in B gastroc increased  -LS    Knee Palpation    Patella --   denies pain  -LS    Patella Tendon --   denies pain  -LS    Medial Joint Line --   denies pain  -LS    Lateral Joint Line --   denies pain  -LS    Knee Palpation? Yes  -LS    Knee Special Tests    Anterior drawer (ACL lesion) Negative  -LS    Posterior drawer (PCL lesion) Negative  -LS    Valgus stress (MCL lesion) Negative  -LS    Varus stress (LCL lesion) Negative  -LS    Piedad’s test (meniscal lesion) Negative  -LS    Bounce home test (meniscal lesion) Negative  -LS    ROM (Range of Motion)    General ROM Detail L knee 0-120 deg; R knee 0-125 deg AROM  -LS    Left Hip    Hip ABduction Gross Movement (4+/5) good plus  -LS    Right Hip    Hip ABduction Gross Movement (4+/5) good plus  -LS    Lower Extremity Flexibility    Hamstrings WNL  -LS    Hip External Rotators WNL  -LS    Hip Internal Rotators WNL  -LS    Gastrocnemius WNL  -LS    Transfers    Transfers, Sit-Stand Hitchcock independent  -LS    Transfer, Safety Issues knees buckling  -LS    Transfer, Impairments impaired balance  -LS    Transfer, Comment 2 instances of LOB with sit to stand or stand to sit transition  -LS    Gait Assessment/Treatment    Gait, Hitchcock Level independent  -LS    Gait, Distance (Feet) 50  -LS    Gait, Gait Deviations antalgic;justin decreased;decreased heel strike;leans to the right;step length decreased  -LS    Stairs Assessment/Treatment    Number of Stairs 4  -LS    Stairs, Handrail Location both sides  -LS    Stairs, Hitchcock Level conditional independence  -LS    Stairs, Technique Used step over step (descending);step over step (ascending)  -LS    Stairs, Impairments pain  -LS    Stairs, Comment Inc pain with ascending >  descending  -LS      User Key  (r) = Recorded By, (t) = Taken By, (c) = Cosigned By    Initials Name Provider Type    DIAN Osman PT Physical Therapist                      Therapy Education  Education Details: discussed lumbar vs. knee origin, reviewed body mechanics to protect knees with sit to stand, discussed importance of core strength, reviewed HEP  Given: HEP, Symptoms/condition management, Pain management  Program: New  How Provided: Demonstration, Written, Verbal  Provided to: Patient  Level of Understanding: Teach back education performed, Verbalized, Demonstrated           PT OP Goals       02/26/18 1400       PT Short Term Goals    STG Date to Achieve 03/12/18  -LS     STG 1 Pt will demonstrate understanding and compliance with initial HEP.  -LS     STG 1 Progress New  -LS     STG 2 Pt will demonstrate appropriate STS mechanics with reduced knee pain by 50%.  -LS     STG 2 Progress New  -LS     STG 3 Pt will demonstrate PPT x 10 in supine position without exacerbation of knee pain.  -LS     STG 3 Progress New  -LS     Long Term Goals    LTG Date to Achieve 03/28/18  -LS     LTG 1 Pt will demonstrate 0 episodes of loss of balance with transfers in clinic  and report no falls since initiation of therapy.  -LS     LTG 1 Progress New  -LS     LTG 2 Pt will report pain at worse in knees decreased from 6/10 to 4/10 or better.  -LS     LTG 2 Progress New  -LS     LTG 3 Pt will report improved tolerance to cooking from 5 minutes to 15 minutes without exacerbation of pain.  -LS     LTG 3 Progress New  -LS     Time Calculation    PT Goal Re-Cert Due Date 05/27/18  -LS       User Key  (r) = Recorded By, (t) = Taken By, (c) = Cosigned By    Initials Name Provider Type    DIAN Osman PT Physical Therapist                PT Assessment/Plan       02/26/18 1404       PT Assessment    Functional Limitations Limitation in home management;Limitations in community activities;Performance in work  activities;Performance in leisure activities;Limitations in functional capacity and performance;Impaired gait  -LS     Impairments Posture;Poor body mechanics;Pain;Muscle strength;Range of motion;Gait;Joint integrity;Balance  -LS     Assessment Comments Pt is 53 yo female reporting chronic hx of evolving B knee pain described as aching and occasionally sharp with pain surrounding patella, posterior knee, and occasionally radiating to B lower leg. She denies numbness or tingling but noted 2 instances of loss of balance after standing from seated position during evaluation. Her B knee ROM is WFL and special testing was negative for ligamentous lesion. Pain reproduced in weightbearing position only. Pt reports chronic hx of evolving LBP as well. Pt able to ascend/descend stairs reciprocally with pain > ascending. She demonstrates antalgic gait pattern with R lateral shift, slight R hip hike, dec heel strike B, and dec step length. She reports R knee pain increased with lumbar extension, R sidebending, and R rotation. LAD reduced knee and LBP. Slump testing was negative B. She demonstrates normal flexibility in B hips, hamstrings, quads, and gastroc. B patellar reflexes are intact and she denies TTP. Patellar mobility is WFL. She demonstrates signs and symptoms of possible lumbar radiculopathy vs. B knee pain. Plan to address core strength and  deficits and continue to assess knee vs. back contribution. She will benefit from continued skilled PT services to address her pain, impaired gait mechanics, and limited tolerance to mobility limiting her participation in cooking, cleaning, and community events.    -LS     Please refer to paper survey for additional self-reported information Yes  -LS     Rehab Potential Good  -LS     Patient/caregiver participated in establishment of treatment plan and goals Yes  -LS     Patient would benefit from skilled therapy intervention Yes  -LS     PT Plan    PT Frequency  2x/week  -LS     Predicted Duration of Therapy Intervention (days/wks) 4 weeks   -LS     Planned CPT's? PT EVAL LOW COMPLEXITY: 42169;PT THER ACT EA 15 MIN: 78718;PT MANUAL THERAPY EA 15 MIN: 83557;PT ULTRASOUND EA 15 MIN: 21189;PT TRACTION LUMBAR: 19408;PT HOT OR COLD PACK TREAT MCARE;PT HOT/COLD PACK WC NONMCARE: 30717;PT GAIT TRAINING EA 15 MIN: 99242;PT THER PROC EA 15 MIN: 31239;PT ELECTRICAL STIM ATTD EA 15 MIN: 17905;PT RE-EVAL: 57167  -LS     PT Plan Comments Assess tolerance to HEP, consider lumbar traction, core strengthening, clamshells, prone hip extension, hip abduction with theraband, progress to QS, SAQ, hip bridge as tolerated.   -LS       User Key  (r) = Recorded By, (t) = Taken By, (c) = Cosigned By    Initials Name Provider Type    LS Michelle Osman PT Physical Therapist                  Exercises       02/26/18 1000          Subjective Comments    Subjective Comments I have pain in both knees. I can not walk, I can not stand.  -LS      Subjective Pain    Able to rate subjective pain? yes  -LS      Pre-Treatment Pain Level 6  -LS      Post-Treatment Pain Level 6  -LS      Exercise 1    Exercise Name 1 SKTC  -LS      Exercise 2    Exercise Name 2 PPT  -LS      Exercise 3    Exercise Name 3 LTR  -LS      Exercise 4    Exercise Name 4 isometric hip adduction  -LS      Exercise 5    Exercise Name 5 decompression position  -LS        User Key  (r) = Recorded By, (t) = Taken By, (c) = Cosigned By    Initials Name Provider Type     Michelle Osman PT Physical Therapist                        Outcome Measure Options: Knee Outcome Score- ADL  Knee Outcome Score  Knee Outcome Score Comments: 49% disability      Time Calculation:   Start Time: 1030  Stop Time: 1115  Time Calculation (min): 45 min     Therapy Charges for Today     Code Description Service Date Service Provider Modifiers Qty    33291602564 HC PT MOBILITY CURRENT 2/26/2018 Michelle Osman PT GP, CK 1    68900658779 HC PT MOBILITY PROJECTED  2/26/2018 Michelle Osman, PT GP, CJ 1    22234335297 HC PT EVAL LOW COMPLEXITY 3 2/26/2018 Michelle Osman, PT GP 1          PT G-Codes  PT Professional Judgement Used?: Yes  Outcome Measure Options: Knee Outcome Score- ADL  Score: 49% disability   Functional Limitation: Mobility: Walking and moving around  Mobility: Walking and Moving Around Current Status (): At least 40 percent but less than 60 percent impaired, limited or restricted  Mobility: Walking and Moving Around Goal Status (): At least 20 percent but less than 40 percent impaired, limited or restricted         Michelle Osman, PT  2/26/2018

## 2018-03-02 ENCOUNTER — APPOINTMENT (OUTPATIENT)
Dept: PHYSICAL THERAPY | Facility: HOSPITAL | Age: 53
End: 2018-03-02

## 2018-03-05 ENCOUNTER — APPOINTMENT (OUTPATIENT)
Dept: PHYSICAL THERAPY | Facility: HOSPITAL | Age: 53
End: 2018-03-05

## 2018-03-08 ENCOUNTER — HOSPITAL ENCOUNTER (OUTPATIENT)
Dept: PHYSICAL THERAPY | Facility: HOSPITAL | Age: 53
Setting detail: THERAPIES SERIES
Discharge: HOME | End: 2018-03-08

## 2018-03-08 DIAGNOSIS — G89.29 CHRONIC PAIN OF BOTH KNEES: Primary | ICD-10-CM

## 2018-03-08 DIAGNOSIS — G89.29 CHRONIC BILATERAL LOW BACK PAIN, WITH SCIATICA PRESENCE UNSPECIFIED: ICD-10-CM

## 2018-03-08 DIAGNOSIS — M25.561 CHRONIC PAIN OF BOTH KNEES: Primary | ICD-10-CM

## 2018-03-08 DIAGNOSIS — M54.5 CHRONIC BILATERAL LOW BACK PAIN, WITH SCIATICA PRESENCE UNSPECIFIED: ICD-10-CM

## 2018-03-08 DIAGNOSIS — M25.562 CHRONIC PAIN OF BOTH KNEES: Primary | ICD-10-CM

## 2018-03-08 PROCEDURE — 97012 MECHANICAL TRACTION THERAPY: CPT

## 2018-03-08 PROCEDURE — 97110 THERAPEUTIC EXERCISES: CPT

## 2018-03-08 NOTE — THERAPY TREATMENT NOTE
Outpatient Physical Therapy Ortho Treatment Note  [facility]     Patient Name: Braydon Soria  : 1965  MRN: [id number]  Today's Date: 3/8/2018      Visit Date: 2018    Visit Dx:    ICD-10-CM ICD-9-CM   1. Chronic pain of both knees M25.561 719.46    M25.562 338.29    G89.29    2. Chronic bilateral low back pain, with sciatica presence unspecified M54.5 724.2    G89.29 338.29       Patient Active Problem List   Diagnosis   • Midline low back pain with left-sided sciatica   • Hyperreflexia   • Incomplete bladder emptying   • Thyroid enlargement   • BP (high blood pressure)   • Chronic bilateral low back pain with bilateral sciatica   • DDD (degenerative disc disease), lumbar   • Lumbar spondylolysis   • Atrophy of muscle of lower leg   • Arthritis of both hands   • Patellar tendonitis of both knees   • Chondromalacia of patella        Past Medical History:   Diagnosis Date   • Anxiety    • Arthritis    • Chest pressure    • Depression    • Dyspnea    • Gallbladder disease     gallstones   • Hormone replacement therapy (HRT)    • Hypercholesterolemia    • Hyperlipidemia    • Hypertension    • Insomnia    • Low back pain    • Lumbosacral disc disease    • Migraines    • Peripheral neuropathy    • SOB (shortness of breath)    • Thyroid nodule         Past Surgical History:   Procedure Laterality Date   •  SECTION      CHILDREN WERE BORN IN [facility]   • CHOLECYSTECTOMY WITH INTRAOPERATIVE CHOLANGIOGRAM N/A 2017    Procedure: CHOLECYSTECTOMY LAPAROSCOPIC INTRAOPERATIVE CHOLANGIOGRAM;  Surgeon: Molina Smith MD;  Location: [facility] OR [facility];  Service:    • COLONOSCOPY N/A 2016    Procedure: COLONOSCOPY TO CECUM WITH HOT SNARE POLYPECTOMY AND CLIPx1;  Surgeon: Molina Smith MD;  Location: [facility] ENDOSCOPY;  Service:    • ENDOSCOPY N/A 2016    Procedure: ESOPHAGOGASTRODUODENOSCOPY;  Surgeon: Molina Smith MD;  Location: [facility] ENDOSCOPY;  Service:    • HYSTERECTOMY      • OOPHORECTOMY     • SINUS SURGERY     • TONSILLECTOMY                               PT Assessment/Plan       03/08/18 1044       PT Assessment    Assessment Comments Pt returns for initial follow up after evaluation and reports no change in knee pain to date. Added hip and knee strengthening today, however pt reports pain with bridges which reduced with adduction ball squeeze. Initiated trial of lumbar traction and pt reports mild relief in knee symptoms.   -CN     PT Plan    PT Plan Comments Assess response to lumbar traction and continue if beneficial. Consider prone hip extension and hip abduction next visit.   -CN       User Key  (r) = Recorded By, (t) = Taken By, (c) = Cosigned By    Initials Name Provider Type    JAKOB Vaughan, PT Physical Therapist                Modalities       03/08/18 0900          Moist Heat    MH Applied Yes  -CN      Location lumbar spine with traction  -CN      Rx Minutes 10 mins  -CN      Traction 01306    Traction Type Lumbar  -CN      Rx Minutes 10  -CN      Duration Intermittent  -CN      Position Hook-lying  -CN      Weight 55   /35  -CN      Hold 40  -CN      Relax 10  -CN        User Key  (r) = Recorded By, (t) = Taken By, (c) = Cosigned By    Initials Name Provider Type    JAKOB Vaughan, PT Physical Therapist                Exercises       03/08/18 1000          Subjective Comments    Subjective Comments I have not been able to sleep the past 3 days because of pain.   -CN      Subjective Pain    Able to rate subjective pain? yes  -CN      Pre-Treatment Pain Level 6  -CN      Exercise 2    Exercise Name 2 PPT  -CN      Reps 2 15  -CN      Exercise 3    Exercise Name 3 LTR on  swiss ball  -CN      Cueing 3 Verbal  -CN      Reps 3 15  -CN      Exercise 5    Exercise Name 5 Supine HS curls on swiss ball  -CN      Cueing 5 Verbal  -CN      Reps 5 15  -CN      Exercise 6    Exercise Name 6 Nustep, L6 with UEs and TA  -CN      Cueing 6 Verbal  -CN       Time (Minutes) 6 6  -CN      Exercise 7    Exercise Name 7 QS into towel roll  -CN      Cueing 7 Tactile  -CN      Reps 7 10  -CN      Exercise 8    Exercise Name 8 SAQ  -CN      Cueing 8 Demo  -CN      Reps 8 10  -CN      Exercise 9    Exercise Name 9 Bridges with ball squeeze  -CN      Cueing 9 Verbal  -CN      Reps 9 10  -CN      Additional Comments Pain without ball, reduced pain with adduction  -CN      Exercise 10    Exercise Name 10 Clamshells  -CN      Cueing 10 Tactile  -CN      Reps 10 10  -CN        User Key  (r) = Recorded By, (t) = Taken By, (c) = Cosigned By    Initials Name Provider Type    JAKOB Vaughan, PT Physical Therapist                               PT OP Goals       03/08/18 1000       PT Short Term Goals    STG Date to Achieve 03/12/18  -CN     STG 1 Pt will demonstrate understanding and compliance with initial HEP.  -CN     STG 1 Progress Ongoing  -CN     STG 1 Progress Comments Pt reports compliance with HEP.  -CN     STG 2 Pt will demonstrate appropriate STS mechanics with reduced knee pain by 50%.  -CN     STG 2 Progress Ongoing  -CN     STG 3 Pt will demonstrate PPT x 10 in supine position without exacerbation of knee pain.  -CN     STG 3 Progress Ongoing  -CN     Long Term Goals    LTG Date to Achieve 03/28/18  -CN     LTG 1 Pt will demonstrate 0 episodes of loss of balance with transfers in clinic  and report no falls since initiation of therapy.  -CN     LTG 1 Progress Ongoing  -CN     LTG 2 Pt will report pain at worse in knees decreased from 6/10 to 4/10 or better.  -CN     LTG 2 Progress Ongoing  -CN     LTG 2 Progress Comments Pt reports pain rated 6/10 today.  -CN     LTG 3 Pt will report improved tolerance to cooking from 5 minutes to 15 minutes without exacerbation of pain.  -CN     LTG 3 Progress Ongoing  -CN       User Key  (r) = Recorded By, (t) = Taken By, (c) = Cosigned By    Initials Name Provider Type    JAKOB Vaughan, PT Physical Therapist           Therapy Education  Given: HEP, Symptoms/condition management, Pain management  Program: Progressed  How Provided: Demonstration, Written, Verbal  Provided to: Patient  Level of Understanding: Teach back education performed, Verbalized, Demonstrated              Time Calculation:   Start Time: 0959  Stop Time: 1045  Time Calculation (min): 46 min    Therapy Charges for Today     Code Description Service Date Service Provider Modifiers Qty    63284122662 HC PT THER PROC EA 15 MIN 3/8/2018 Ebonie Vaughan, PT GP 2    [id number]  PT HOT OR COLD PACK TREAT MCARE 3/8/2018 Ebonie Vaughan, PT GP 1    [id number]  PT-TRACTION MECHANICAL 3/8/2018 Ebonie Vaughan, PT  1                    Ebonie Vaughan, PT  3/8/2018

## 2018-03-12 ENCOUNTER — OFFICE VISIT (OUTPATIENT)
Dept: NEUROLOGY | Facility: CLINIC | Age: 53
End: 2018-03-12

## 2018-03-12 ENCOUNTER — HOSPITAL ENCOUNTER (OUTPATIENT)
Dept: PHYSICAL THERAPY | Facility: HOSPITAL | Age: 53
Setting detail: THERAPIES SERIES
Discharge: HOME | End: 2018-03-12

## 2018-03-12 ENCOUNTER — LAB (OUTPATIENT)
Dept: LAB | Facility: HOSPITAL | Age: 53
End: 2018-03-12

## 2018-03-12 VITALS
WEIGHT: 183 LBS | DIASTOLIC BLOOD PRESSURE: 84 MMHG | HEART RATE: 83 BPM | HEIGHT: 65 IN | SYSTOLIC BLOOD PRESSURE: 130 MMHG | OXYGEN SATURATION: 97 % | BODY MASS INDEX: 30.49 KG/M2

## 2018-03-12 DIAGNOSIS — M25.561 CHRONIC PAIN OF BOTH KNEES: Primary | ICD-10-CM

## 2018-03-12 DIAGNOSIS — G60.9 IDIOPATHIC PERIPHERAL NEUROPATHY: ICD-10-CM

## 2018-03-12 DIAGNOSIS — G89.29 CHRONIC BILATERAL LOW BACK PAIN, WITH SCIATICA PRESENCE UNSPECIFIED: ICD-10-CM

## 2018-03-12 DIAGNOSIS — M25.562 CHRONIC PAIN OF BOTH KNEES: Primary | ICD-10-CM

## 2018-03-12 DIAGNOSIS — G89.29 CHRONIC PAIN OF BOTH KNEES: Primary | ICD-10-CM

## 2018-03-12 DIAGNOSIS — M54.5 CHRONIC BILATERAL LOW BACK PAIN, WITH SCIATICA PRESENCE UNSPECIFIED: ICD-10-CM

## 2018-03-12 DIAGNOSIS — G60.9 IDIOPATHIC PERIPHERAL NEUROPATHY: Primary | ICD-10-CM

## 2018-03-12 LAB
CK SERPL-CCNC: 62 U/L (ref 20–180)
ERYTHROCYTE [SEDIMENTATION RATE] IN BLOOD: 7 MM/HR (ref 0–30)
FOLATE SERPL-MCNC: >20 NG/ML (ref 4.78–24.2)
T4 FREE SERPL-MCNC: 1.22 NG/DL (ref 0.93–1.7)
TSH SERPL DL<=0.05 MIU/L-ACNC: 2.23 MIU/ML (ref 0.27–4.2)
VIT B12 BLD-MCNC: 605 PG/ML (ref 211–946)

## 2018-03-12 PROCEDURE — 97012 MECHANICAL TRACTION THERAPY: CPT

## 2018-03-12 PROCEDURE — 85652 RBC SED RATE AUTOMATED: CPT | Performed by: PSYCHIATRY & NEUROLOGY

## 2018-03-12 PROCEDURE — 97110 THERAPEUTIC EXERCISES: CPT

## 2018-03-12 PROCEDURE — 84439 ASSAY OF FREE THYROXINE: CPT | Performed by: PSYCHIATRY & NEUROLOGY

## 2018-03-12 PROCEDURE — 36415 COLL VENOUS BLD VENIPUNCTURE: CPT | Performed by: PSYCHIATRY & NEUROLOGY

## 2018-03-12 PROCEDURE — 82550 ASSAY OF CK (CPK): CPT

## 2018-03-12 PROCEDURE — 99243 OFF/OP CNSLTJ NEW/EST LOW 30: CPT | Performed by: PSYCHIATRY & NEUROLOGY

## 2018-03-12 PROCEDURE — 82607 VITAMIN B-12: CPT | Performed by: PSYCHIATRY & NEUROLOGY

## 2018-03-12 PROCEDURE — 82746 ASSAY OF FOLIC ACID SERUM: CPT | Performed by: PSYCHIATRY & NEUROLOGY

## 2018-03-12 PROCEDURE — 84443 ASSAY THYROID STIM HORMONE: CPT | Performed by: PSYCHIATRY & NEUROLOGY

## 2018-03-12 NOTE — PROGRESS NOTES
"CC: Hand pain and bilateral knee and calf pain; low back pain    HPI:  Braydon Soria is a  52 y.o.  right-handed Eastern  female who was sent for neurologic consultation by Dr. Aponte regarding painful symptoms in both hands knees and calves.  She also has low back pain.  She has had fairly extensive evaluation to date.  She was seen by Dr. Sage Redd and Dr. Fabián Jackson neurosurgically and both agree she does not have a surgical process in the spine.  She has had MRIs of the cervical thoracic and lumbar spine.  She has degenerative disc disease but no significant central canal stenosis or foraminal stenosis area she had an EMG done by Dr. Hernandes which was normal of the upper extremities.  She recently had MRIs of both knees showing patellar tendinitis without internal derangements otherwise however there was fatty atrophy in multiple muscles around the knee on both sides.  This change can be seen in patients who have neuropathic processes affecting those muscles by can also be seen in an idiopathic setting.  The patient had been on statin drugs that off about 2 months without any change in her symptoms.    The patient speaks only limited English.  An  was here with her.  She seemed to have a lot of symptoms \"sometimes\" but her main symptoms were pain in her hands when she  and pain in her knees and calves when she weight bears.  She describes burning sometimes in the feet or hands she has an area along the ulnar aspect of the distal phalanx of the left second digit in an area distal to a laceration.  She denies numbness in her feet.        Past Medical History:   Diagnosis Date   • Anxiety    • Arthritis    • Chest pressure    • Depression    • Dyspnea    • Gallbladder disease     gallstones   • Hormone replacement therapy (HRT)    • Hypercholesterolemia    • Hyperlipidemia    • Hypertension    • Insomnia    • Low back pain    • Lumbosacral disc disease    • Migraines    • " Peripheral neuropathy    • SOB (shortness of breath)    • Thyroid nodule          Past Surgical History:   Procedure Laterality Date   •  SECTION      CHILDREN WERE BORN IN [facility]   • CHOLECYSTECTOMY WITH INTRAOPERATIVE CHOLANGIOGRAM N/A 2017    Procedure: CHOLECYSTECTOMY LAPAROSCOPIC INTRAOPERATIVE CHOLANGIOGRAM;  Surgeon: Molina Smith MD;  Location:  [facility];  Service:    • COLONOSCOPY N/A 2016    Procedure: COLONOSCOPY TO CECUM WITH HOT SNARE POLYPECTOMY AND CLIPx1;  Surgeon: Molina Smith MD;  Location[facility] ENDOSCOPY;  Service:    • ENDOSCOPY N/A 2016    Procedure: ESOPHAGOGASTRODUODENOSCOPY;  Surgeon: Molina Smith MD;  Location: [facility] ENDOSCOPY;  Service:    • HYSTERECTOMY     • OOPHORECTOMY     • SINUS SURGERY     • TONSILLECTOMY             Current Outpatient Prescriptions:   •  amitriptyline (ELAVIL) 100 MG tablet, Take 100 mg by mouth Every Night., Disp: , Rfl:   •  amLODIPine (NORVASC) 5 MG tablet, Take 5 mg by mouth Daily., Disp: , Rfl: 1  •  citalopram (CeleXA) 40 MG tablet, Take 40 mg by mouth Daily., Disp: , Rfl:   •  diclofenac (VOLTAREN) 1 % gel gel, Apply 4 g topically 2 (Two) Times a Day. Use per pkg insert, Disp: 100 g, Rfl: 2  •  mirtazapine (REMERON) 7.5 MG tablet, Take 7.5 mg by mouth Every Night., Disp: , Rfl:   •  prazosin (MINIPRESS) 5 MG capsule, Take 5 mg by mouth Every Night., Disp: , Rfl:   •  TOPROL XL 50 MG 24 hr tablet, Take 50 mg by mouth Daily., Disp: , Rfl: 11  •  cyclobenzaprine (FLEXERIL) 5 MG tablet, TAKE ONE TABLET BY MOUTH TWICE DAILY AS NEEDED FOR MUSCLE SPASM (PAIN), Disp: 60 tablet, Rfl: 1  •  LORazepam (ATIVAN) 0.5 MG tablet, Take 0.5 mg by mouth Every 8 (Eight) Hours As Needed., Disp: , Rfl:       Family History   Problem Relation Age of Onset   • Stroke Mother    • Migraines Mother    • Heart disease Father    • Heart attack Father    • Heart disease Brother    • Heart attack Brother    • Stroke Paternal Grandmother           Social History     Social History   • Marital status:      Spouse name: N/A   • Number of children: N/A   • Years of education: N/A     Occupational History   • Not on file.     Social History Main Topics   • Smoking status: Former Smoker   • Smokeless tobacco: Never Used      Comment: smokes occasionally   • Alcohol use No   • Drug use: No   • Sexual activity: Defer     Other Topics Concern   • Not on file     Social History Narrative   • No narrative on file         No Known Allergies      Pain Scale:8-9/10 both knees with weight bearing        ROS:  Review of Systems   Constitutional: Negative for activity change, appetite change and fatigue.   HENT: Positive for rhinorrhea, sinus pressure and tinnitus. Negative for ear pain, facial swelling and hearing loss.    Eyes: Negative for pain, redness and itching.   Respiratory: Positive for shortness of breath and stridor. Negative for cough and choking.    Cardiovascular: Positive for leg swelling. Negative for chest pain.   Gastrointestinal: Negative for abdominal pain, nausea and vomiting.   Endocrine: Negative for cold intolerance and heat intolerance.   Musculoskeletal: Positive for arthralgias, back pain, myalgias and neck pain.   Skin: Negative for color change, pallor, rash and wound.   Allergic/Immunologic: Negative for environmental allergies and food allergies.   Neurological: Positive for headaches. Negative for dizziness, tremors, seizures, syncope, facial asymmetry, speech difficulty, weakness, light-headedness and numbness.   Hematological: Negative for adenopathy. Does not bruise/bleed easily.   Psychiatric/Behavioral: Positive for decreased concentration and sleep disturbance. Negative for agitation, behavioral problems, confusion, dysphoric mood, hallucinations, self-injury and suicidal ideas. The patient is nervous/anxious. The patient is not hyperactive.            Physical Exam:  Vitals:    03/12/18 1308   BP: 130/84   Pulse: 83  "  SpO2: 97%   Weight: 83 kg (183 lb)   Height: 165.1 cm (65\")     Body mass index is 30.45 kg/m².    Physical Exam  Gen.: Overweight white female no acute distress  HEENT: Normocephalic no evidence of trauma.  Discs flat.  No A-V nicking.  Throat negative.  Neck: Supple.  Thyroid appears prominent.  No cervical bruits.  Radial pulses were strong and simultaneous.  Heart: Regular rate and rhythm without murmurs  Examination of her lower extremities demonstrates no calf tenderness or tenderness in the popliteal fossa is.  There is no pedal edema.  Straight leg raising signs were accomplished to 90° with complaints of pain behind the knees.  No swelling was identified of the knees    Neurological Exam:   Mental status: Awake alert and conversant.  She speaks mostly in her native language which appears to be of Eastern  type.  No evidence of an affective disorder thought disorder delusions or hallucinations.  HCF: No aphasia or apraxia or dysarthria.  Memory grossly intact and knowledge of recent events intact  Cranial nerves: 2-12 intact  Motor: Normal tone and bulk.  There is mild weakness of the abductor pollicis brevis muscles but all other muscles tested were 5 over 5 including all muscles tested in the legs.  Reflexes are +2 diffusely with trace ankle jerks and downgoing toe signs  Sensory: There is reduced sensation but some hyperpathia in the left distal phalanx, ulnar aspect of digit 2.  Otherwise there is normal light touch and pinprick throughout the arms and legs.  Vibration sense was intact at the ankles and position sense intact in the great toes.  Cerebellar: Finger to nose rapid movements heel-to-shin intact  Gait and Station: Antalgic but able to walk on toes and heels.  Tandem walk was intact.  No Romberg no drift        Results:      Lab Results   Component Value Date    GLUCOSE 112 (H) 01/09/2018    BUN 10 01/09/2018    CREATININE 0.65 01/09/2018    EGFRIFNONA 96 01/09/2018    BCR 15.4 " 01/09/2018    CO2 26.4 01/09/2018    CALCIUM 9.0 01/09/2018       Lab Results   Component Value Date    WBC 9.36 01/09/2018    HGB 13.9 01/09/2018    HCT 43.0 01/09/2018    MCV 94.3 01/09/2018     01/09/2018         .No results found for: RPR      No results found for: TSH, T7VBRQA, G2SCERT, THYROIDAB      No results found for: RGYWYIDH93      No results found for: FOLATE      No results found for: HGBA1C      MRI cervical and lumbar spine films reviewed  MRI both knees reports reviewed    Assessment:   1.  Bilateral knee pain and calf pain-she does not have objective findings for a neuropathic or myopathic disorder.  However her MRIs of the knees show fatty atrophy in multiple muscles surrounding the knees.  My suspicion is that it's idiopathic.  Since her knee pain is dramatically worse with weight loading it is most likely of knee origin presumably tendinitis  2.  History of laceration of the left index finger with neuropathic pain in the distribution of the digital branch to the ulnar aspect of digit 1.          Plan:  1.  EMG both legs.  Consider doing median palmar studies both hands to look for mild carpal tunnel syndrome in addition  2.  Labs including ESR, CK, thyroid functions and B12 and folate  3.  Will discuss results when I see her for her EMG                          Dictated utilizing Dragon dictation.

## 2018-03-12 NOTE — THERAPY TREATMENT NOTE
Outpatient Physical Therapy Ortho Treatment Note  [facility]     Patient Name: Braydon Soria  : 1965  MRN: [id number]  Today's Date: 3/12/2018      Visit Date: 2018    Visit Dx:    ICD-10-CM ICD-9-CM   1. Chronic pain of both knees M25.561 719.46    M25.562 338.29    G89.29    2. Chronic bilateral low back pain, with sciatica presence unspecified M54.5 724.2    G89.29 338.29       Patient Active Problem List   Diagnosis   • Midline low back pain with left-sided sciatica   • Hyperreflexia   • Incomplete bladder emptying   • Thyroid enlargement   • BP (high blood pressure)   • Chronic bilateral low back pain with bilateral sciatica   • DDD (degenerative disc disease), lumbar   • Lumbar spondylolysis   • Atrophy of muscle of lower leg   • Arthritis of both hands   • Patellar tendonitis of both knees   • Chondromalacia of patella        Past Medical History:   Diagnosis Date   • Anxiety    • Arthritis    • Chest pressure    • Depression    • Dyspnea    • Gallbladder disease     gallstones   • Hormone replacement therapy (HRT)    • Hypercholesterolemia    • Hyperlipidemia    • Hypertension    • Insomnia    • Low back pain    • Lumbosacral disc disease    • Migraines    • Peripheral neuropathy    • SOB (shortness of breath)    • Thyroid nodule         Past Surgical History:   Procedure Laterality Date   •  SECTION      CHILDREN WERE BORN IN [facility]   • CHOLECYSTECTOMY WITH INTRAOPERATIVE CHOLANGIOGRAM N/A 2017    Procedure: CHOLECYSTECTOMY LAPAROSCOPIC INTRAOPERATIVE CHOLANGIOGRAM;  Surgeon: Molina Smith MD;  [place] [facility] [place] [facility];  Service:    • COLONOSCOPY N/A 2016    Procedure: COLONOSCOPY TO CECUM WITH HOT SNARE POLYPECTOMY AND CLIPx1;  Surgeon: Molina Smith MD;  Location: [facility] ENDOSCOPY;  Service:    • ENDOSCOPY N/A 2016    Procedure: ESOPHAGOGASTRODUODENOSCOPY;  Surgeon: Molina Smith MD;  Location: [facility] ENDOSCOPY;  Service:    • HYSTERECTOMY      • OOPHORECTOMY     • SINUS SURGERY     • TONSILLECTOMY                               PT Assessment/Plan     Row Name 03/12/18 1154          PT Assessment    Assessment Comments Pt reports one day of relief following traction last visit. Some symptoms seem to originate in knee as pt reports sharp high pain with increased R knee flexion in supine position. Increased traction force by 5# today.   -LS        PT Plan    PT Plan Comments Continue lumbar traction if beneficial, core/hip strengthening/flexibility.   -LS       User Key  (r) = Recorded By, (t) = Taken By, (c) = Cosigned By    Initials Name Provider Type    DIAN Osman PT Physical Therapist                Modalities     Row Name 03/12/18 1100             Subjective Comments    Subjective Comments I felt good for a day after last time. But then when I sat and stood back up I had pain.   -LS         Subjective Pain    Able to rate subjective pain? yes  -LS      Pre-Treatment Pain Level 5  -LS         Moist Heat    MH Applied Yes  -LS      Location lumbar spine with traction  -LS      Rx Minutes 10 mins  -LS         Traction 66869    Traction Type Lumbar  -LS      Rx Minutes 10  -LS      Duration Intermittent  -LS      Position Hook-lying  -LS      Weight 55   /35  -LS      Hold 40  -LS      Relax 10  -LS        User Key  (r) = Recorded By, (t) = Taken By, (c) = Cosigned By    Initials Name Provider Type    DIAN Osman PT Physical Therapist                Exercises     Row Name 03/12/18 1100             Subjective Comments    Subjective Comments I felt good for a day after last time. But then when I sat and stood back up I had pain.   -LS         Subjective Pain    Able to rate subjective pain? yes  -LS      Pre-Treatment Pain Level 5  -LS         Exercise 2    Exercise Name 2 PPT  -LS      Reps 2 10  -LS      Time 2 5  -LS      Reps 2 15  -LS         Exercise 3    Exercise Name 3 LTR on  swiss ball  -LS      Cueing 3 Verbal  -LS      Reps 3 20   -LS      Reps 3 15  -LS         Exercise 5    Exercise Name 5 Supine HS curls on swiss ball  -LS      Cueing 5 Verbal  -LS      Reps 5 15  -LS      Reps 5 15  -LS         Exercise 6    Exercise Name 6 Nustep, L6 with UEs and TA  -LS      Time 6 6  -LS      Cueing 6 Verbal  -LS         Exercise 7    Exercise Name 7 QS into towel roll  -LS      Cueing 7 Tactile  -LS      Reps 7 10  -LS         Exercise 8    Exercise Name 8 SAQ  -LS      Reps 8 15  -LS      Cueing 8 Demo  -LS      Reps 8 10  -LS         Exercise 9    Reps 9 10  -LS         Exercise 10    Exercise Name 10 Clamshells  -LS      Cueing 10 Tactile  -LS      Reps 10 10  -LS         Exercise 11    Exercise Name 11 B shoulder extension  -LS      Reps 11 20  -LS      Additional Comments 10 B/uni  -LS         Exercise 12    Exercise Name 12 stair HS stretch  -LS      Reps 12 3  -LS      Time 12 20  -LS         Exercise 13    Exercise Name 13 stair gastroc stretch  -LS      Reps 13 3  -LS      Time 13 20  -LS        User Key  (r) = Recorded By, (t) = Taken By, (c) = Cosigned By    Initials Name Provider Type    LS Michelle Osman, PT Physical Therapist                               PT OP Goals     Row Name 03/12/18 1100          PT Short Term Goals    STG Date to Achieve 03/12/18  -LS     STG 1 Pt will demonstrate understanding and compliance with initial HEP.  -LS     STG 1 Progress Met  -LS     STG 2 Pt will demonstrate appropriate STS mechanics with reduced knee pain by 50%.  -LS     STG 2 Progress Ongoing  -LS     STG 2 Progress Comments Pain with STS transition.  -LS     STG 3 Pt will demonstrate PPT x 10 in supine position without exacerbation of knee pain.  -LS     STG 3 Progress Ongoing  -LS        Long Term Goals    LTG Date to Achieve 03/28/18  -LS     LTG 1 Pt will demonstrate 0 episodes of loss of balance with transfers in clinic  and report no falls since initiation of therapy.  -LS     LTG 1 Progress Ongoing  -LS     LTG 2 Pt will report pain at worse  in knees decreased from 6/10 to 4/10 or better.  -LS     LTG 2 Progress Ongoing  -LS     LTG 3 Pt will report improved tolerance to cooking from 5 minutes to 15 minutes without exacerbation of pain.  -LS     LTG 3 Progress Ongoing  -LS       User Key  (r) = Recorded By, (t) = Taken By, (c) = Cosigned By    Initials Name Provider Type    LS Michelle Osman PT Physical Therapist          Therapy Education  Given: Symptoms/condition management  Program: Reinforced  How Provided: Verbal, Demonstration, Written  Provided to: Patient  Level of Understanding: Teach back education performed, Verbalized, Demonstrated              Time Calculation:   Start Time: 1115  Stop Time: 1200  Time Calculation (min): 45 min    Therapy Charges for Today     Code Description Service Date Service Provider Modifiers Qty    33424689843 HC PT THER PROC EA 15 MIN 3/12/2018 Michelle Osman, PT GP 2    [id number]  PT TRACTION LUMBAR 3/12/2018 Michelle Osman, PT GP 1    [id number]  PT HOT OR COLD PACK TREAT MCARE 3/12/2018 Michelle Osman, PT GP 1                    Michelle Osman PT  3/12/2018

## 2018-03-15 ENCOUNTER — HOSPITAL ENCOUNTER (OUTPATIENT)
Dept: PHYSICAL THERAPY | Facility: HOSPITAL | Age: 53
Setting detail: THERAPIES SERIES
Discharge: HOME | End: 2018-03-15

## 2018-03-15 DIAGNOSIS — G89.29 CHRONIC BILATERAL LOW BACK PAIN, WITH SCIATICA PRESENCE UNSPECIFIED: ICD-10-CM

## 2018-03-15 DIAGNOSIS — G89.29 CHRONIC PAIN OF BOTH KNEES: Primary | ICD-10-CM

## 2018-03-15 DIAGNOSIS — M25.562 CHRONIC PAIN OF BOTH KNEES: Primary | ICD-10-CM

## 2018-03-15 DIAGNOSIS — M54.5 CHRONIC BILATERAL LOW BACK PAIN, WITH SCIATICA PRESENCE UNSPECIFIED: ICD-10-CM

## 2018-03-15 DIAGNOSIS — M25.561 CHRONIC PAIN OF BOTH KNEES: Primary | ICD-10-CM

## 2018-03-15 PROCEDURE — 97110 THERAPEUTIC EXERCISES: CPT

## 2018-03-15 PROCEDURE — 97012 MECHANICAL TRACTION THERAPY: CPT

## 2018-03-15 NOTE — THERAPY TREATMENT NOTE
Outpatient Physical Therapy Ortho Treatment Note  [facility]     Patient Name: Braydon Soria  : 1965  MRN: [id number]  Today's Date: 3/15/2018      Visit Date: 03/15/2018    Visit Dx:    ICD-10-CM ICD-9-CM   1. Chronic pain of both knees M25.561 719.46    M25.562 338.29    G89.29    2. Chronic bilateral low back pain, with sciatica presence unspecified M54.5 724.2    G89.29 338.29       Patient Active Problem List   Diagnosis   • Midline low back pain with left-sided sciatica   • Hyperreflexia   • Incomplete bladder emptying   • Thyroid enlargement   • BP (high blood pressure)   • Chronic bilateral low back pain with bilateral sciatica   • DDD (degenerative disc disease), lumbar   • Lumbar spondylolysis   • Atrophy of muscle of lower leg   • Arthritis of both hands   • Patellar tendonitis of both knees   • Chondromalacia of patella        Past Medical History:   Diagnosis Date   • Anxiety    • Arthritis    • Chest pressure    • Depression    • Dyspnea    • Gallbladder disease     gallstones   • Hormone replacement therapy (HRT)    • Hypercholesterolemia    • Hyperlipidemia    • Hypertension    • Insomnia    • Low back pain    • Lumbosacral disc disease    • Migraines    • Peripheral neuropathy    • SOB (shortness of breath)    • Thyroid nodule         Past Surgical History:   Procedure Laterality Date   •  SECTION      CHILDREN WERE BORN IN [facility]   • CHOLECYSTECTOMY WITH INTRAOPERATIVE CHOLANGIOGRAM N/A 2017    Procedure: CHOLECYSTECTOMY LAPAROSCOPIC INTRAOPERATIVE CHOLANGIOGRAM;  Surgeon: Molina Smith MD;  [place] [facility] [place] [facility] [place]  Service:    • COLONOSCOPY N/A 2016    Procedure: COLONOSCOPY TO CECUM WITH HOT SNARE POLYPECTOMY AND CLIPx1;  Surgeon: Molina Smith MD;  Location: [facility] ENDOSCOPY;  Service:    • ENDOSCOPY N/A 2016    Procedure: ESOPHAGOGASTRODUODENOSCOPY;  Surgeon: Molina Smith MD;  Location: [facility] ENDOSCOPY;  Service:    • HYSTERECTOMY      • OOPHORECTOMY     • SINUS SURGERY     • TONSILLECTOMY                               PT Assessment/Plan     Row Name 03/15/18 1050          PT Assessment    Assessment Comments Pt reporting moderate knee pain over last few days. Able to tolerate bridges today without inc knee pain. Pt reports relief from traction, however, limited evidence to suggest knee pain of lumbar origin at this time with symptoms remaining similar since initiating. Will consider lumbar traction for one more visit, then d/c to assess change. Focused on primarily CKC activities/eccentric quadriceps strengthening. Pt to see rheumatologist due to B hand/knee pain.   -LS       User Key  (r) = Recorded By, (t) = Taken By, (c) = Cosigned By    Initials Name Provider Type    DIAN Osman PT Physical Therapist                Modalities     Row Name 03/15/18 0900             Subjective Comments    Subjective Comments I have had moderate pain the last few days. Neurologist ordered an EMG for my legs and I am going to see a rheumatologist.   -LS         Subjective Pain    Able to rate subjective pain? yes  -LS      Pre-Treatment Pain Level 5  -LS         Moist Heat    MH Applied Yes  -LS      Location lumbar spine with traction  -LS      Rx Minutes 10 mins  -LS         Traction 29441    Traction Type Lumbar  -LS      Rx Minutes 10  -LS      Duration Intermittent  -LS      Position Hook-lying  -LS      Weight 60   /40  -LS      Hold 45  -LS      Relax 15  -LS        User Key  (r) = Recorded By, (t) = Taken By, (c) = Cosigned By    Initials Name Provider Type    DIAN Osman PT Physical Therapist                Exercises     Row Name 03/15/18 1000 03/15/18 0900          Subjective Comments    Subjective Comments  -- I have had moderate pain the last few days. Neurologist ordered an EMG for my legs and I am going to see a rheumatologist.   -LS        Subjective Pain    Able to rate subjective pain?  -- yes  -LS     Pre-Treatment Pain Level  --  5  -LS        Exercise 2    Exercise Name 2  -- PPT  -LS     Reps 2  -- 10  -LS     Time 2  -- 5  -LS     Reps 2 15  -LS 15  -LS        Exercise 3    Reps 3 15  -LS 15  -LS        Exercise 5    Reps 5 15  -LS 15  -LS        Exercise 6    Exercise Name 6  -- Nustep, L6 with UEs and TA  -LS     Time 6  -- 6  -LS     Cueing 6  -- Verbal  -LS        Exercise 7    Exercise Name 7  -- QS into blue ball seated  -LS     Reps 7  -- 10  -LS     Time 7  -- 5  -LS     Additional Comments  -- B  -LS     Cueing 7  -- Tactile  -LS     Reps 7 10  -LS 10  -LS        Exercise 8    Exercise Name 8  -- SAQ  -LS     Reps 8  -- 20  -LS     Additional Comments  -- 2#  -LS     Cueing 8  -- Demo  -LS     Reps 8 10  -LS 10  -LS        Exercise 9    Exercise Name 9  -- bridge  -LS     Reps 9  -- 12  -LS     Time 9  -- 3  -LS     Cueing 9  -- Verbal  -LS     Reps 9 10  -LS 10  -LS        Exercise 10    Exercise Name 10  -- Clamshells  -LS     Reps 10  -- 12  -LS     Additional Comments  -- B  -LS     Cueing 10  -- Tactile  -LS     Reps 10 10  -LS 10  -LS        Exercise 12    Exercise Name 12  -- stair HS stretch  -LS     Reps 12  -- 3  -LS     Time 12  -- 20  -LS        Exercise 13    Exercise Name 13  -- stair gastroc stretch  -LS     Reps 13  -- 3  -LS     Time 13  -- 20  -LS        Exercise 14    Exercise Name 14  -- LAQ  -LS     Reps 14  -- 20  -LS     Additional Comments  -- 2#; cued to slow eccentric phase  -LS       User Key  (r) = Recorded By, (t) = Taken By, (c) = Cosigned By    Initials Name Provider Type    LS Michelle Osman, PT Physical Therapist                               PT OP Goals     Row Name 03/15/18 1000          PT Short Term Goals    STG Date to Achieve 03/12/18  -LS     STG 1 Pt will demonstrate understanding and compliance with initial HEP.  -LS     STG 1 Progress Met  -LS     STG 2 Pt will demonstrate appropriate STS mechanics with reduced knee pain by 50%.  -LS     STG 2 Progress Ongoing  -LS     STG 2 Progress  Comments Pain with STS after mat exercises  -LS     STG 3 Pt will demonstrate PPT x 10 in supine position without exacerbation of knee pain.  -LS     STG 3 Progress Met  -LS        Long Term Goals    LTG Date to Achieve 03/28/18  -LS     LTG 1 Pt will demonstrate 0 episodes of loss of balance with transfers in clinic  and report no falls since initiation of therapy.  -LS     LTG 1 Progress Ongoing  -LS     LTG 1 Progress Comments No balance difficulties noted in last 2 sessions.  -LS     LTG 2 Pt will report pain at worse in knees decreased from 6/10 to 4/10 or better.  -LS     LTG 2 Progress Ongoing  -LS     LTG 2 Progress Comments 5/10 today  -LS     LTG 3 Pt will report improved tolerance to cooking from 5 minutes to 15 minutes without exacerbation of pain.  -LS     LTG 3 Progress Ongoing  -LS       User Key  (r) = Recorded By, (t) = Taken By, (c) = Cosigned By    Initials Name Provider Type    LS Michelle Osman PT Physical Therapist          Therapy Education  Given: Symptoms/condition management  Program: Reinforced  How Provided: Verbal, Demonstration  Provided to: Patient  Level of Understanding: Teach back education performed, Verbalized, Demonstrated              Time Calculation:   Start Time: 0945  Stop Time: 1030  Time Calculation (min): 45 min    Therapy Charges for Today     Code Description Service Date Service Provider Modifiers Qty    22724829026 HC PT THER PROC EA 15 MIN 3/15/2018 Michelle Osman, PT GP 2    90110612706 HC PT TRACTION LUMBAR 3/15/2018 Michelle Osman, PT GP 1    27079859802 HC PT HOT OR COLD PACK TREAT MCARE 3/15/2018 Michelle Osman, PT GP 1                    Michelle Osman PT  3/15/2018

## 2018-03-19 ENCOUNTER — HOSPITAL ENCOUNTER (OUTPATIENT)
Dept: PHYSICAL THERAPY | Facility: HOSPITAL | Age: 53
Setting detail: THERAPIES SERIES
Discharge: HOME | End: 2018-03-19

## 2018-03-19 DIAGNOSIS — G89.29 CHRONIC PAIN OF BOTH KNEES: Primary | ICD-10-CM

## 2018-03-19 DIAGNOSIS — M54.5 CHRONIC BILATERAL LOW BACK PAIN, WITH SCIATICA PRESENCE UNSPECIFIED: ICD-10-CM

## 2018-03-19 DIAGNOSIS — M25.562 CHRONIC PAIN OF BOTH KNEES: Primary | ICD-10-CM

## 2018-03-19 DIAGNOSIS — M25.561 CHRONIC PAIN OF BOTH KNEES: Primary | ICD-10-CM

## 2018-03-19 DIAGNOSIS — G89.29 CHRONIC BILATERAL LOW BACK PAIN, WITH SCIATICA PRESENCE UNSPECIFIED: ICD-10-CM

## 2018-03-19 PROCEDURE — 97110 THERAPEUTIC EXERCISES: CPT

## 2018-03-19 PROCEDURE — 97012 MECHANICAL TRACTION THERAPY: CPT

## 2018-03-19 NOTE — THERAPY TREATMENT NOTE
Outpatient Physical Therapy Ortho Treatment Note  [facility]     Patient Name: Braydon Soria  : 1965  MRN: [id number]  Today's Date: 3/19/2018      Visit Date: 2018    Visit Dx:    ICD-10-CM ICD-9-CM   1. Chronic pain of both knees M25.561 719.46    M25.562 338.29    G89.29    2. Chronic bilateral low back pain, with sciatica presence unspecified M54.5 724.2    G89.29 338.29       Patient Active Problem List   Diagnosis   • Midline low back pain with left-sided sciatica   • Hyperreflexia   • Incomplete bladder emptying   • Thyroid enlargement   • BP (high blood pressure)   • Chronic bilateral low back pain with bilateral sciatica   • DDD (degenerative disc disease), lumbar   • Lumbar spondylolysis   • Atrophy of muscle of lower leg   • Arthritis of both hands   • Patellar tendonitis of both knees   • Chondromalacia of patella        Past Medical History:   Diagnosis Date   • Anxiety    • Arthritis    • Chest pressure    • Depression    • Dyspnea    • Gallbladder disease     gallstones   • Hormone replacement therapy (HRT)    • Hypercholesterolemia    • Hyperlipidemia    • Hypertension    • Insomnia    • Low back pain    • Lumbosacral disc disease    • Migraines    • Peripheral neuropathy    • SOB (shortness of breath)    • Thyroid nodule         Past Surgical History:   Procedure Laterality Date   •  SECTION      [facility]DR[facility] W[facility] B[facility] IN [facility]   • CHOLECYSTECTOMY WITH INTRAOPERATIVE CHOLANGIOGRAM N/A 2017    Procedure: CHOLECYSTECTOMY LAPAROSCOPIC INTRAOPERATIVE CHOLANGIOGRAM;  Surgeon: Molina Smith MD;  Location[place] [facility] [place] [facility] [place]  Service:    • COLONOSCOPY N/A 2016    Procedure: COLONOSCOPY TO CECUM WITH HOT SNARE POLYPECTOMY AND CLIPx1;  Surgeon: Molina Smith MD;  Location: [facility] ENDOSCOPY;  Service:    • ENDOSCOPY N/A 2016    Procedure: ESOPHAGOGASTRODUODENOSCOPY;  Surgeon: Molina Smith MD;  Location: [facility] ENDOSCOPY;  Service:    • HYSTERECTOMY      • OOPHORECTOMY     • SINUS SURGERY     • TONSILLECTOMY                               PT Assessment/Plan     Row Name 03/19/18 1003          PT Assessment    Assessment Comments Continue to report relief with traction. Will consider d/c to traction next visit and assess change  -WS       User Key  (r) = Recorded By, (t) = Taken By, (c) = Cosigned By    Initials Name Provider Type    WS Zachary Castillo PTA Physical Therapy Assistant                Modalities     Row Name 03/19/18 0930             Moist Heat    MH Applied Yes  -WS      Location lumbar spine with traction  -WS      Rx Minutes 10 mins  -WS         Traction 24377    Traction Type Lumbar  -WS      Rx Minutes 10  -WS      Duration Intermittent  -WS      Position Hook-lying  -WS      Weight 60   /40  -WS      Hold 45  -WS      Relax 15  -WS        User Key  (r) = Recorded By, (t) = Taken By, (c) = Cosigned By    Initials Name Provider Type    WS Zachary Castillo PTA Physical Therapy Assistant                Exercises     Row Name 03/19/18 0930             Subjective Comments    Subjective Comments Pain in left knee bad today  -WS         Subjective Pain    Able to rate subjective pain? yes  -WS      Pre-Treatment Pain Level 8  -WS      Subjective Pain Comment Back pain 4/10  -WS         Exercise 1    Exercise Name 1 calf raise  -WS      Reps 1 15  -WS         Exercise 2    Exercise Name 2 PPT  -WS      Cueing 2 Verbal;Tactile  -WS      Reps 2 15  -WS         Exercise 3    Exercise Name 3 HS curl B  -WS      Reps 3 15  -WS         Exercise 6    Exercise Name 6 Nustep, L6 with UEs and TA  -WS      Time 6 6  -WS      Cueing 6 Verbal  -WS         Exercise 7    Exercise Name 7 QS into blue ball seated  -WS      Reps 7 10  -WS      Time 7 5  -WS      Additional Comments B  -WS      Cueing 7 Tactile  -WS         Exercise 8    Exercise Name 8 SAQ  -WS      Reps 8 20  -WS      Additional Comments 2#  -WS      Cueing 8 Demo  -WS         Exercise 9    Exercise  Name 9 bridge  -WS      Reps 9 15  -WS      Time 9 3  -WS      Cueing 9 Verbal  -WS         Exercise 10    Exercise Name 10 Clamshells  -WS      Reps 10 15  -WS      Additional Comments B  -WS      Cueing 10 Tactile  -WS         Exercise 12    Exercise Name 12 stair HS stretch  -WS      Reps 12 3  -WS      Time 12 20  -WS         Exercise 13    Exercise Name 13 stair gastroc stretch  -WS      Reps 13 3  -WS      Time 13 20  -WS         Exercise 14    Exercise Name 14 LAQ  -WS      Reps 14 20  -WS      Additional Comments 2#  -WS        User Key  (r) = Recorded By, (t) = Taken By, (c) = Cosigned By    Initials Name Provider Type    JOANNA Castillo PTA Physical Therapy Assistant                               PT OP Goals     Row Name 03/19/18 1000          PT Short Term Goals    STG Date to Achieve 03/12/18  -     STG 1 Pt will demonstrate understanding and compliance with initial HEP.  -WS     STG 1 Progress Met  -     STG 2 Pt will demonstrate appropriate STS mechanics with reduced knee pain by 50%.  -WS     STG 2 Progress Ongoing  -     STG 3 Pt will demonstrate PPT x 10 in supine position without exacerbation of knee pain.  -     STG 3 Progress Met  -        Long Term Goals    LTG Date to Achieve 03/28/18  -     LTG 1 Pt will demonstrate 0 episodes of loss of balance with transfers in clinic  and report no falls since initiation of therapy.  -WS     LTG 1 Progress Ongoing  -     LTG 2 Pt will report pain at worse in knees decreased from 6/10 to 4/10 or better.  -     LTG 2 Progress Ongoing  -     LTG 3 Pt will report improved tolerance to cooking from 5 minutes to 15 minutes without exacerbation of pain.  -     LTG 3 Progress Ongoing  -       User Key  (r) = Recorded By, (t) = Taken By, (c) = Cosigned By    Initials Name Provider Type    JOANNA Castillo PTA Physical Therapy Assistant          Therapy Education  Given: HEP, Symptoms/condition management, Posture/body mechanics, Pain  management  Program: Reinforced  How Provided: Verbal  Provided to: Patient              Time Calculation:   Start Time: 0930  Stop Time: 1015  Time Calculation (min): 45 min    Therapy Charges for Today     Code Description Service Date Service Provider Modifiers Qty    02991751624 HC PT THER PROC EA 15 MIN 3/19/2018 Zachary Castillo PTA GP 2    39955958890 HC PT TRACTION LUMBAR 3/19/2018 Zachary Castillo PTA GP 1    14978976400 HC PT HOT OR COLD PACK TREAT MCARE 3/19/2018 Zachary Castillo PTA GP 1                    Zachary Castillo PTA  3/19/2018

## 2018-03-22 ENCOUNTER — HOSPITAL ENCOUNTER (OUTPATIENT)
Dept: PHYSICAL THERAPY | Facility: HOSPITAL | Age: 53
Setting detail: THERAPIES SERIES
Discharge: HOME | End: 2018-03-22

## 2018-03-22 DIAGNOSIS — M25.561 CHRONIC PAIN OF BOTH KNEES: Primary | ICD-10-CM

## 2018-03-22 DIAGNOSIS — M54.5 CHRONIC BILATERAL LOW BACK PAIN, WITH SCIATICA PRESENCE UNSPECIFIED: ICD-10-CM

## 2018-03-22 DIAGNOSIS — G89.29 CHRONIC PAIN OF BOTH KNEES: Primary | ICD-10-CM

## 2018-03-22 DIAGNOSIS — M25.562 CHRONIC PAIN OF BOTH KNEES: Primary | ICD-10-CM

## 2018-03-22 DIAGNOSIS — G89.29 CHRONIC BILATERAL LOW BACK PAIN, WITH SCIATICA PRESENCE UNSPECIFIED: ICD-10-CM

## 2018-03-22 PROCEDURE — 97110 THERAPEUTIC EXERCISES: CPT

## 2018-03-22 NOTE — THERAPY TREATMENT NOTE
Outpatient Physical Therapy Ortho Treatment Note  [facility]     Patient Name: Braydon Soria  : 1965  MRN: [id number]  Today's Date: 3/22/2018      Visit Date: 2018    Visit Dx:    ICD-10-CM ICD-9-CM   1. Chronic pain of both knees M25.561 719.46    M25.562 338.29    G89.29    2. Chronic bilateral low back pain, with sciatica presence unspecified M54.5 724.2    G89.29 338.29       Patient Active Problem List   Diagnosis   • Midline low back pain with left-sided sciatica   • Hyperreflexia   • Incomplete bladder emptying   • Thyroid enlargement   • BP (high blood pressure)   • Chronic bilateral low back pain with bilateral sciatica   • DDD (degenerative disc disease), lumbar   • Lumbar spondylolysis   • Atrophy of muscle of lower leg   • Arthritis of both hands   • Patellar tendonitis of both knees   • Chondromalacia of patella        Past Medical History:   Diagnosis Date   • Anxiety    • Arthritis    • Chest pressure    • Depression    • Dyspnea    • Gallbladder disease     gallstones   • Hormone replacement therapy (HRT)    • Hypercholesterolemia    • Hyperlipidemia    • Hypertension    • Insomnia    • Low back pain    • Lumbosacral disc disease    • Migraines    • Peripheral neuropathy    • SOB (shortness of breath)    • Thyroid nodule         Past Surgical History:   Procedure Laterality Date   •  SECTION      CHILDREN WERE BORN IN [facility]   • CHOLECYSTECTOMY WITH INTRAOPERATIVE CHOLANGIOGRAM N/A 2017    Procedure: CHOLECYSTECTOMY LAPAROSCOPIC INTRAOPERATIVE CHOLANGIOGRAM;  Surgeon: Molina Smith MD;  [place] [facility] [place] [facility] [place]  Service:    • COLONOSCOPY N/A 2016    Procedure: COLONOSCOPY TO CECUM WITH HOT SNARE POLYPECTOMY AND CLIPx1;  Surgeon: Molina Smith MD;  Location: [facility] ENDOSCOPY;  Service:    • ENDOSCOPY N/A 2016    Procedure: ESOPHAGOGASTRODUODENOSCOPY;  Surgeon: Molina Smith MD;  Location: [facility] ENDOSCOPY;  Service:    • HYSTERECTOMY      • OOPHORECTOMY     • SINUS SURGERY     • TONSILLECTOMY                               PT Assessment/Plan     Row Name 03/22/18 0937          PT Assessment    Assessment Comments Trial of ice for knee pain control. Assess next visit. Held traction today to assess any change. Conrtinue strengthening barbi LE  -WS       User Key  (r) = Recorded By, (t) = Taken By, (c) = Cosigned By    Initials Name Provider Type     Zachary Castillo PTA Physical Therapy Assistant                Modalities     Row Name 03/22/18 0850             Ice    Ice Applied Yes  -WS      Location barbi knee up on bolster  -WS      Rx Minutes 10 mins  -WS        User Key  (r) = Recorded By, (t) = Taken By, (c) = Cosigned By    Initials Name Provider Type     Zachary Castillo PTA Physical Therapy Assistant                Exercises     Row Name 03/22/18 0850             Subjective Comments    Subjective Comments Knee pain a little better today  -WS         Subjective Pain    Able to rate subjective pain? yes  -WS      Pre-Treatment Pain Level 6  -WS         Exercise 1    Exercise Name 1 calf raise  -WS      Reps 1 15  -WS         Exercise 2    Exercise Name 2 PPT  -WS      Cueing 2 Verbal;Tactile  -WS      Reps 2 15  -WS         Exercise 3    Exercise Name 3 HS curl B  -WS      Reps 3 15  -WS      Additional Comments 3#  -WS         Exercise 4    Exercise Name 4 hip abd  -WS      Reps 4 15  -WS      Additional Comments 3#  -WS         Exercise 6    Exercise Name 6 Nustep, L6 with UEs and TA  -WS      Time 6 6  -WS      Cueing 6 Verbal  -WS         Exercise 7    Exercise Name 7 QS into blue ball seated  -WS      Reps 7 10  -WS      Time 7 5  -WS      Additional Comments B  -WS      Cueing 7 Tactile  -WS         Exercise 8    Exercise Name 8 SAQ  -WS      Reps 8 20  -WS      Additional Comments 3#  -WS      Cueing 8 Demo  -WS         Exercise 9    Exercise Name 9 bridge  -WS      Reps 9 15  -WS      Time 9 3  -WS      Cueing 9 Verbal  -WS          Exercise 10    Exercise Name 10 Clamshells  -WS      Reps 10 15  -WS      Additional Comments B  -WS      Cueing 10 Tactile  -WS         Exercise 12    Exercise Name 12 stair HS stretch  -WS      Reps 12 3  -WS      Time 12 20  -WS         Exercise 13    Exercise Name 13 stair gastroc stretch  -WS      Reps 13 3  -WS         Exercise 14    Exercise Name 14 LAQ  -WS      Reps 14 20  -WS      Additional Comments #3  -WS        User Key  (r) = Recorded By, (t) = Taken By, (c) = Cosigned By    Initials Name Provider Type    JOANNA Castillo PTA Physical Therapy Assistant                               PT OP Goals     Row Name 03/22/18 0900          PT Short Term Goals    STG Date to Achieve 03/12/18  -     STG 1 Pt will demonstrate understanding and compliance with initial HEP.  -     STG 1 Progress Met  -     STG 2 Pt will demonstrate appropriate STS mechanics with reduced knee pain by 50%.  -     STG 2 Progress Ongoing  -     STG 3 Pt will demonstrate PPT x 10 in supine position without exacerbation of knee pain.  -     STG 3 Progress Met  -        Long Term Goals    LTG Date to Achieve 03/28/18  -     LTG 1 Pt will demonstrate 0 episodes of loss of balance with transfers in clinic  and report no falls since initiation of therapy.  -     LTG 1 Progress Ongoing  -     LTG 2 Pt will report pain at worse in knees decreased from 6/10 to 4/10 or better.  -     LTG 2 Progress Ongoing  -     LTG 3 Pt will report improved tolerance to cooking from 5 minutes to 15 minutes without exacerbation of pain.  -     LTG 3 Progress Ongoing  -       User Key  (r) = Recorded By, (t) = Taken By, (c) = Cosigned By    Initials Name Provider Type    JOANNA Castillo PTA Physical Therapy Assistant          Therapy Education  Given: HEP, Symptoms/condition management, Edema management, Pain management, Posture/body mechanics  Program: Reinforced  How Provided: Verbal  Provided to: Patient              Time  Calculation:   Start Time: 0850  Stop Time: 0935  Time Calculation (min): 45 min    Therapy Charges for Today     Code Description Service Date Service Provider Modifiers Qty    68717103319 HC PT THER PROC EA 15 MIN 3/22/2018 Zachary Castillo PTA GP 2    [id number]  PT HOT OR COLD PACK TREAT MCARE 3/22/2018 Zachary Castillo PTA GP 1                    Zachary Castillo PTA  3/22/2018

## 2018-03-28 ENCOUNTER — OFFICE VISIT (OUTPATIENT)
Dept: ORTHOPEDIC SURGERY | Facility: CLINIC | Age: 53
End: 2018-03-28

## 2018-03-28 VITALS — WEIGHT: 180 LBS | HEIGHT: 65 IN | TEMPERATURE: 98.7 F | BODY MASS INDEX: 29.99 KG/M2

## 2018-03-28 DIAGNOSIS — M62.569 ATROPHY OF MUSCLE OF LOWER LEG, UNSPECIFIED LATERALITY: ICD-10-CM

## 2018-03-28 DIAGNOSIS — M76.51 PATELLAR TENDONITIS OF BOTH KNEES: ICD-10-CM

## 2018-03-28 DIAGNOSIS — M76.52 PATELLAR TENDONITIS OF BOTH KNEES: ICD-10-CM

## 2018-03-28 DIAGNOSIS — M22.40 CHONDROMALACIA OF PATELLA, UNSPECIFIED LATERALITY: Primary | ICD-10-CM

## 2018-03-28 PROCEDURE — 99213 OFFICE O/P EST LOW 20 MIN: CPT | Performed by: ORTHOPAEDIC SURGERY

## 2018-03-28 NOTE — PROGRESS NOTES
"Knee Exam      Patient: Braydon Dobbinsjlaradha    YOB: 1965 52 y.o. female    Chief Complaints: knees hurt    History of Present Illness: Follows up about a one half year history of moderate intermittent aching pain mainly in the anterior aspect of both knees symptoms are worse with kneeling and going up and down steps.    She was last seen on 1/29/18 with MRIs that showed bilateral patellar tendinitis as well as fatty atrophy in both legs.  She is now subsequent being followed by neurology and I reviewed their notes she's doing nerve conduction studies done and has lab work completed.  She states that she continues to get aching pain and swelling in her hands as well as her knees and it depends a lot on the weather.  She is scheduled to see rheumatology in June.    For her knees she has been doing physical therapy but she thinks maybe helps little bit and also using Voltaren gel which maybe helps a little bit  HPI    ROS: knee pain  Past Medical History:   Diagnosis Date   • Anxiety    • Arthritis    • Chest pressure    • Depression    • Dyspnea    • Gallbladder disease     gallstones   • Hormone replacement therapy (HRT)    • Hypercholesterolemia    • Hyperlipidemia    • Hypertension    • Insomnia    • Low back pain    • Lumbosacral disc disease    • Migraines    • Peripheral neuropathy    • SOB (shortness of breath)    • Thyroid nodule        Physical Exam:   Vitals:    03/28/18 0913   Temp: 98.7 °F (37.1 °C)   Weight: 81.6 kg (180 lb)   Height: 165.1 cm (65\")     Well developed with normal mood.She is seen with her son and  present nonantalgic gait.  Both knees show somewhat global achiness diffusely but no warmth erythema or appreciable effusion.  Mild discomfort along the patellar tendon bilaterally but no prepatellar bursitis.      Radiology: None Performed      Assessment/Plan:  1. bilateral patellar tendinitis with clinical chondromalacia  2.  Bilateral chronic fatty atrophic changes to " the muscles around the knee and lower legs    We had a nice long pleasant discussion today and she'll continue following up with neurology  regarding the atrophic changes to the muscles around the lower legs.  Regarding her knees I reviewed that I don't see anything to do from an operative standpoint nor would I recommend injections at this time.  I have recommended that she speak with her primary care physician about use of anti-inflammatories so that kidney and liver function may be monitored and avoid squatting and kneeling or twisting activity as much as possible and to continue therapy until they have plateaued and then continue with home exercises.    She is due to see rheumatology in June and the may be able to shed more light on further treatment for her as I really have nothing further to offer from an operative standpoint    I will see her back in 3 months.

## 2018-03-29 ENCOUNTER — HOSPITAL ENCOUNTER (OUTPATIENT)
Dept: PHYSICAL THERAPY | Facility: HOSPITAL | Age: 53
Setting detail: THERAPIES SERIES
Discharge: HOME | End: 2018-03-29

## 2018-03-29 DIAGNOSIS — M25.561 CHRONIC PAIN OF BOTH KNEES: Primary | ICD-10-CM

## 2018-03-29 DIAGNOSIS — M54.5 CHRONIC BILATERAL LOW BACK PAIN, WITH SCIATICA PRESENCE UNSPECIFIED: ICD-10-CM

## 2018-03-29 DIAGNOSIS — G89.29 CHRONIC BILATERAL LOW BACK PAIN, WITH SCIATICA PRESENCE UNSPECIFIED: ICD-10-CM

## 2018-03-29 DIAGNOSIS — G89.29 CHRONIC PAIN OF BOTH KNEES: Primary | ICD-10-CM

## 2018-03-29 DIAGNOSIS — M25.562 CHRONIC PAIN OF BOTH KNEES: Primary | ICD-10-CM

## 2018-03-29 PROCEDURE — 97110 THERAPEUTIC EXERCISES: CPT

## 2018-03-29 NOTE — THERAPY TREATMENT NOTE
Outpatient Physical Therapy Ortho Treatment Note  [facility]     Patient Name: Braydon Soria  : 1965  MRN: [id number]  Today's Date: 3/29/2018      Visit Date: 2018    Visit Dx:    ICD-10-CM ICD-9-CM   1. Chronic pain of both knees M25.561 719.46    M25.562 338.29    G89.29    2. Chronic bilateral low back pain, with sciatica presence unspecified M54.5 724.2    G89.29 338.29       Patient Active Problem List   Diagnosis   • Midline low back pain with left-sided sciatica   • Hyperreflexia   • Incomplete bladder emptying   • Thyroid enlargement   • BP (high blood pressure)   • Chronic bilateral low back pain with bilateral sciatica   • DDD (degenerative disc disease), lumbar   • Lumbar spondylolysis   • Atrophy of muscle of lower leg   • Arthritis of both hands   • Patellar tendonitis of both knees   • Chondromalacia of patella        Past Medical History:   Diagnosis Date   • Anxiety    • Arthritis    • Chest pressure    • Depression    • Dyspnea    • Gallbladder disease     gallstones   • Hormone replacement therapy (HRT)    • Hypercholesterolemia    • Hyperlipidemia    • Hypertension    • Insomnia    • Low back pain    • Lumbosacral disc disease    • Migraines    • Peripheral neuropathy    • SOB (shortness of breath)    • Thyroid nodule         Past Surgical History:   Procedure Laterality Date   •  SECTION      CHILDREN WERE BORN IN [facility]   • CHOLECYSTECTOMY WITH INTRAOPERATIVE CHOLANGIOGRAM N/A 2017    Procedure: CHOLECYSTECTOMY LAPAROSCOPIC INTRAOPERATIVE CHOLANGIOGRAM;  Surgeon: Molina Smith MD;  Location: [facility] OR [facility];  Service:    • COLONOSCOPY N/A 2016    Procedure: COLONOSCOPY TO CECUM WITH HOT SNARE POLYPECTOMY AND CLIPx1;  Surgeon: Molina Smith MD;  Location: [facility] ENDOSCOPY;  Service:    • ENDOSCOPY N/A 2016    Procedure: ESOPHAGOGASTRODUODENOSCOPY;  Surgeon: Molina Smith MD;  Location: [facility] ENDOSCOPY;  Service:    • HYSTERECTOMY      • OOPHORECTOMY     • SINUS SURGERY     • TONSILLECTOMY                               PT Assessment/Plan     Row Name 03/29/18 1120          PT Assessment    Assessment Comments Patient has improved balance. Continue to progress strengthening.   -WS       User Key  (r) = Recorded By, (t) = Taken By, (c) = Cosigned By    Initials Name Provider Type    WS Zachary Castillo PTA Physical Therapy Assistant                    Exercises     Row Name 03/29/18 1055             Subjective Comments    Subjective Comments Knee pain barbi. Not sure if ice helped with pain after last workout. Doing exercises at home.   -WS         Subjective Pain    Able to rate subjective pain? yes  -WS      Pre-Treatment Pain Level 5  -WS         Exercise 1    Exercise Name 1 calf raise  -WS      Reps 1 15  -WS         Exercise 2    Exercise Name 2 PPT  -WS      Cueing 2 Verbal;Tactile  -WS      Reps 2 15  -WS         Exercise 3    Exercise Name 3 HS curl B  -WS      Reps 3 15  -WS      Additional Comments 3#  -WS         Exercise 4    Exercise Name 4 hip abd B  -WS      Reps 4 15  -WS      Additional Comments 3#  -WS         Exercise 6    Exercise Name 6 Nustep, L6 with UEs and TA  -WS      Time 6 6  -WS         Exercise 7    Exercise Name 7 QS into blue ball seated  -WS      Reps 7 10  -WS      Time 7 5  -WS      Additional Comments 8  -WS      Cueing 7 Tactile  -WS         Exercise 8    Exercise Name 8 SAQ  -WS      Reps 8 20  -WS      Additional Comments 4#  -WS      Cueing 8 Demo  -WS         Exercise 9    Exercise Name 9 bridge  -WS      Reps 9 15  -WS      Time 9 3  -WS      Cueing 9 Verbal  -WS         Exercise 10    Exercise Name 10 Clamshells  -WS      Reps 10 15  -WS      Additional Comments B  -WS      Cueing 10 Tactile  -WS         Exercise 12    Exercise Name 12 stair HS stretch  -WS      Reps 12 3  -WS      Time 12 20  -WS         Exercise 13    Exercise Name 13 stair gastroc stretch  -WS      Reps 13 3  -WS      Time 13 20  -WS          Exercise 14    Exercise Name 14 LAQ  -      Reps 14 20  -      Additional Comments 4#  -        User Key  (r) = Recorded By, (t) = Taken By, (c) = Cosigned By    Initials Name Provider Type    WS Zachary Castillo PTA Physical Therapy Assistant                               PT OP Goals     Row Name 03/29/18 1100          PT Short Term Goals    STG Date to Achieve 03/12/18  -     STG 1 Pt will demonstrate understanding and compliance with initial HEP.  -     STG 1 Progress Met  -     STG 2 Pt will demonstrate appropriate STS mechanics with reduced knee pain by 50%.  -     STG 2 Progress Ongoing  -     STG 3 Pt will demonstrate PPT x 10 in supine position without exacerbation of knee pain.  -     STG 3 Progress Met  -        Long Term Goals    LTG Date to Achieve 03/28/18  -     LTG 1 Pt will demonstrate 0 episodes of loss of balance with transfers in clinic  and report no falls since initiation of therapy.  -     LTG 1 Progress Met  -     LTG 2 Pt will report pain at worse in knees decreased from 6/10 to 4/10 or better.  -     LTG 2 Progress Ongoing  -     LTG 3 Pt will report improved tolerance to cooking from 5 minutes to 15 minutes without exacerbation of pain.  -     LTG 3 Progress Ongoing  -       User Key  (r) = Recorded By, (t) = Taken By, (c) = Cosigned By    Initials Name Provider Type    JOANNA Castillo PTA Physical Therapy Assistant          Therapy Education  Given: HEP, Symptoms/condition management  Program: Reinforced  How Provided: Verbal              Time Calculation:   Start Time: 1055  Stop Time: 1120  Time Calculation (min): 25 min    Therapy Charges for Today     Code Description Service Date Service Provider Modifiers Qty    33352672331 HC PT THER PROC EA 15 MIN 3/29/2018 Zachary Castillo PTA GP 2                    Zachary Castillo PTA  3/29/2018

## 2018-04-02 ENCOUNTER — APPOINTMENT (OUTPATIENT)
Dept: PHYSICAL THERAPY | Facility: HOSPITAL | Age: 53
End: 2018-04-02

## 2018-04-05 ENCOUNTER — HOSPITAL ENCOUNTER (OUTPATIENT)
Dept: PHYSICAL THERAPY | Facility: HOSPITAL | Age: 53
Setting detail: THERAPIES SERIES
Discharge: HOME | End: 2018-04-05

## 2018-04-05 DIAGNOSIS — G89.29 CHRONIC BILATERAL LOW BACK PAIN, WITH SCIATICA PRESENCE UNSPECIFIED: ICD-10-CM

## 2018-04-05 DIAGNOSIS — M54.5 CHRONIC BILATERAL LOW BACK PAIN, WITH SCIATICA PRESENCE UNSPECIFIED: ICD-10-CM

## 2018-04-05 DIAGNOSIS — G89.29 CHRONIC PAIN OF BOTH KNEES: Primary | ICD-10-CM

## 2018-04-05 DIAGNOSIS — M25.561 CHRONIC PAIN OF BOTH KNEES: Primary | ICD-10-CM

## 2018-04-05 DIAGNOSIS — M25.562 CHRONIC PAIN OF BOTH KNEES: Primary | ICD-10-CM

## 2018-04-05 PROCEDURE — 97110 THERAPEUTIC EXERCISES: CPT

## 2018-04-05 NOTE — THERAPY TREATMENT NOTE
Outpatient Physical Therapy Ortho Treatment Note  [facility]     Patient Name: Braydon Soria  : 1965  MRN: [id number]  Today's Date: 2018      Visit Date: 2018    Visit Dx:    ICD-10-CM ICD-9-CM   1. Chronic pain of both knees M25.561 719.46    M25.562 338.29    G89.29    2. Chronic bilateral low back pain, with sciatica presence unspecified M54.5 724.2    G89.29 338.29       Patient Active Problem List   Diagnosis   • Midline low back pain with left-sided sciatica   • Hyperreflexia   • Incomplete bladder emptying   • Thyroid enlargement   • BP (high blood pressure)   • Chronic bilateral low back pain with bilateral sciatica   • DDD (degenerative disc disease), lumbar   • Lumbar spondylolysis   • Atrophy of muscle of lower leg   • Arthritis of both hands   • Patellar tendonitis of both knees   • Chondromalacia of patella        Past Medical History:   Diagnosis Date   • Anxiety    • Arthritis    • Chest pressure    • Depression    • Dyspnea    • Gallbladder disease     gallstones   • Hormone replacement therapy (HRT)    • Hypercholesterolemia    • Hyperlipidemia    • Hypertension    • Insomnia    • Low back pain    • Lumbosacral disc disease    • Migraines    • Peripheral neuropathy    • SOB (shortness of breath)    • Thyroid nodule         Past Surgical History:   Procedure Laterality Date   •  SECTION      CHILDREN WERE BORN IN [facility]   • CHOLECYSTECTOMY WITH INTRAOPERATIVE CHOLANGIOGRAM N/A 2017    Procedure: CHOLECYSTECTOMY LAPAROSCOPIC INTRAOPERATIVE CHOLANGIOGRAM;  Surgeon: Molina Smith MD;  Location: [facility] OR [facility];  Service:    • COLONOSCOPY N/A 2016    Procedure: COLONOSCOPY TO CECUM WITH HOT SNARE POLYPECTOMY AND CLIPx1;  Surgeon: Molina Smith MD;  Location: [facility] ENDOSCOPY;  Service:    • ENDOSCOPY N/A 2016    Procedure: ESOPHAGOGASTRODUODENOSCOPY;  Surgeon: Molina Smith MD;  Location: [facility] ENDOSCOPY;  Service:    • HYSTERECTOMY      • OOPHORECTOMY     • SINUS SURGERY     • TONSILLECTOMY                               PT Assessment/Plan     Row Name 04/05/18 1012          PT Assessment    Assessment Comments Pain level bilateral knee continue to be high. Will consider increasing weight with LAQ/SAQ nent visit to increase quad strength  -WS       User Key  (r) = Recorded By, (t) = Taken By, (c) = Cosigned By    Initials Name Provider Type    WS Zachary Castillo PTA Physical Therapy Assistant                    Exercises     Row Name 04/05/18 0945             Subjective Comments    Subjective Comments barbi knee pain   late 15 min  -WS         Subjective Pain    Able to rate subjective pain? yes  -WS      Pre-Treatment Pain Level 7  -WS         Exercise 1    Exercise Name 1 calf raise  -WS      Reps 1 15  -WS         Exercise 2    Exercise Name 2 PPT  -WS      Cueing 2 Verbal;Tactile  -WS      Reps 2 15  -WS         Exercise 3    Exercise Name 3 HS curl B  -WS      Reps 3 15  -WS      Additional Comments 3#  -WS         Exercise 4    Exercise Name 4 hip abd B  -WS      Reps 4 15  -WS      Additional Comments 3#  -WS         Exercise 6    Exercise Name 6 Nustep, L6 with UEs and TA  -WS      Time 6 5  -WS         Exercise 7    Exercise Name 7 QS into blue ball seated  -WS      Reps 7 15  -WS      Additional Comments B  -WS      Cueing 7 Tactile  -WS         Exercise 8    Exercise Name 8 SAQ  -WS      Reps 8 20  -WS      Additional Comments 4#  -WS      Cueing 8 Demo  -WS         Exercise 9    Exercise Name 9 bridge  -WS      Reps 9 15  -WS      Time 9 3  -WS      Cueing 9 Verbal  -WS         Exercise 10    Exercise Name 10 Clamshells  -WS      Reps 10 15  -WS      Additional Comments B  -WS      Cueing 10 Tactile  -WS        User Key  (r) = Recorded By, (t) = Taken By, (c) = Cosigned By    Initials Name Provider Type     Zachary Castillo PTA Physical Therapy Assistant                               PT OP Goals     Row Name 04/05/18 1000           PT Short Term Goals    STG Date to Achieve 03/12/18  -WS     STG 1 Pt will demonstrate understanding and compliance with initial HEP.  -WS     STG 1 Progress Met  -WS     STG 2 Pt will demonstrate appropriate STS mechanics with reduced knee pain by 50%.  -WS     STG 2 Progress Ongoing  -WS     STG 3 Pt will demonstrate PPT x 10 in supine position without exacerbation of knee pain.  -WS     STG 3 Progress Met  -        Long Term Goals    LTG Date to Achieve 03/28/18  -WS     LTG 1 Pt will demonstrate 0 episodes of loss of balance with transfers in clinic  and report no falls since initiation of therapy.  -WS     LTG 1 Progress Met  -WS     LTG 2 Pt will report pain at worse in knees decreased from 6/10 to 4/10 or better.  -WS     LTG 2 Progress Ongoing  -WS     LTG 3 Pt will report improved tolerance to cooking from 5 minutes to 15 minutes without exacerbation of pain.  -WS     LTG 3 Progress Ongoing  -WS       User Key  (r) = Recorded By, (t) = Taken By, (c) = Cosigned By    Initials Name Provider Type    JOANNA Castillo PTA Physical Therapy Assistant          Therapy Education  Given: HEP, Symptoms/condition management, Pain management  Program: Reinforced  How Provided: Verbal  Provided to: Patient              Time Calculation:   Start Time: 0945  Stop Time: 1025  Time Calculation (min): 40 min    Therapy Charges for Today     Code Description Service Date Service Provider Modifiers Qty    43961248701 HC PT THER PROC EA 15 MIN 4/5/2018 Zachary Castillo PTA GP 1    11223547744 HC PT HOT OR COLD PACK TREAT MCARE 4/5/2018 Zachary Castillo PTA GP 1    41006114314 HC PT THER PROC EA 15 MIN 4/5/2018 Zachary Castillo PTA GP 1                    Zachary Castillo PTA  4/5/2018

## 2018-04-06 ENCOUNTER — PROCEDURE VISIT (OUTPATIENT)
Dept: NEUROLOGY | Facility: CLINIC | Age: 53
End: 2018-04-06

## 2018-04-06 VITALS — BODY MASS INDEX: 29.99 KG/M2 | HEIGHT: 65 IN | WEIGHT: 180 LBS

## 2018-04-06 DIAGNOSIS — M54.17 LUMBOSACRAL RADICULOPATHY AT S1: Primary | ICD-10-CM

## 2018-04-06 PROCEDURE — 95886 MUSC TEST DONE W/N TEST COMP: CPT | Performed by: PSYCHIATRY & NEUROLOGY

## 2018-04-06 PROCEDURE — 95909 NRV CNDJ TST 5-6 STUDIES: CPT | Performed by: PSYCHIATRY & NEUROLOGY

## 2018-04-06 NOTE — PROGRESS NOTES
Procedure   Procedures      EMG and Nerve Conduction Studies    Please see data sheets for details on methods, temperatures and lab standards. EMG muscles tested for upper extremity studies include the deltoid, biceps, triceps, pronator teres, extensor digitorum communis, first dorsal interosseous and abductor pollicis brevis.  EMG muscles tested for lower extremity studies include the vastus lateralis, tibialis anterior, peroneus longus, medial gastrocnemius and extensor digitorum brevis.  Additional muscles tested as needed.  Paraspinal muscles tested as needed.  The complete report includes the data sheets.    Indication: Fatty atrophy in the gastrocnemius/soleus and semimembranosus muscles bilaterally  History: 52-year-old [place] woman who presents with bilateral knee pain but on MRI she has fatty atrophy in the gastrocnemius/soleus and semimembranosus muscles bilaterally.  She had low back pain status post MRI in January 2016 showing no significant root compressions and she had epidural steroid injections.  She presents with her son who is an  since she speaks very little English.      Ht: 65 inches  Wt: 180 pounds  HbA1C: No results found for: HGBA1C  TSH:   Lab Results   Component Value Date    TSH 2.230 03/12/2018       Technical summary:  Nerve conduction studies were obtained in the left leg with some comparison on the right.  The feet were cool but temperature correction was not needed since the distal latencies were normal.  Needle examination was obtained on selected muscles in both legs.    Results:  1.  Normal left sural sensory study.  2.  Normal left superficial peroneal sensory study.  3.  Normal left peroneal motor study.  4.  Normal tibial motor studies bilaterally.  5.  Needle examination of selected muscles in both legs showed irritative changes in the form of positive sharp waves and or fibrillations in both medial gastrocnemius muscles and both semi-tendinosis muscles.  Some  additional changes were seen in the short head of the biceps femoris on the right.  The voluntary motor units were of normal size and configuration and recruitment was normal.  All other muscles tested in both legs showed normal insertional activities, motor units and recruitment patterns.  Lumbar paraspinals at L5 showed positive sharp waves bilaterally.    Impression:  Abnormal study consistent with acute bilateral S1 radiculopathies.  No evidence of peripheral neuropathy or focal tibial neuropathies identified.  Study results were discussed with the patient and .    Remington Barboza M.D.      Addendum: Review of lab tests showed normal sedimentation rate, CPK, B12 and folate and thyroid functions.  Review of previous MRI lumbar spine report indicates some degenerative disc disease but no significant evidence for an S1 root compression was identified.  We will repeat MRI lumbar spine no contrast and follow-up subsequently.        Dictated utilizing Dragon dictation.

## 2018-04-09 ENCOUNTER — HOSPITAL ENCOUNTER (OUTPATIENT)
Dept: PHYSICAL THERAPY | Facility: HOSPITAL | Age: 53
Setting detail: THERAPIES SERIES
Discharge: HOME | End: 2018-04-09

## 2018-04-09 DIAGNOSIS — M54.5 CHRONIC BILATERAL LOW BACK PAIN, WITH SCIATICA PRESENCE UNSPECIFIED: ICD-10-CM

## 2018-04-09 DIAGNOSIS — M25.561 CHRONIC PAIN OF BOTH KNEES: Primary | ICD-10-CM

## 2018-04-09 DIAGNOSIS — M25.562 CHRONIC PAIN OF BOTH KNEES: Primary | ICD-10-CM

## 2018-04-09 DIAGNOSIS — G89.29 CHRONIC PAIN OF BOTH KNEES: Primary | ICD-10-CM

## 2018-04-09 DIAGNOSIS — G89.29 CHRONIC BILATERAL LOW BACK PAIN, WITH SCIATICA PRESENCE UNSPECIFIED: ICD-10-CM

## 2018-04-09 PROCEDURE — G8978 MOBILITY CURRENT STATUS: HCPCS

## 2018-04-09 PROCEDURE — 97012 MECHANICAL TRACTION THERAPY: CPT

## 2018-04-09 PROCEDURE — G8979 MOBILITY GOAL STATUS: HCPCS

## 2018-04-09 PROCEDURE — 97110 THERAPEUTIC EXERCISES: CPT

## 2018-04-09 NOTE — THERAPY PROGRESS REPORT/RE-CERT
Outpatient Physical Therapy Ortho Progress Note  [facility]     Patient Name: Braydon Soria  : 1965  MRN: [id number]  Today's Date: 2018      Visit Date: 2018    Patient Active Problem List   Diagnosis   • Midline low back pain with left-sided sciatica   • Hyperreflexia   • Incomplete bladder emptying   • Thyroid enlargement   • BP (high blood pressure)   • Chronic bilateral low back pain with bilateral sciatica   • DDD (degenerative disc disease), lumbar   • Lumbar spondylolysis   • Atrophy of muscle of lower leg   • Arthritis of both hands   • Patellar tendonitis of both knees   • Chondromalacia of patella        Past Medical History:   Diagnosis Date   • Anxiety    • Arthritis    • Chest pressure    • Depression    • Dyspnea    • Gallbladder disease     gallstones   • Hormone replacement therapy (HRT)    • Hypercholesterolemia    • Hyperlipidemia    • Hypertension    • Insomnia    • Low back pain    • Lumbosacral disc disease    • Migraines    • Peripheral neuropathy    • SOB (shortness of breath)    • Thyroid nodule         Past Surgical History:   Procedure Laterality Date   •  SECTION      CHILDREN WERE BORN IN [place]   • CHOLECYSTECTOMY WITH INTRAOPERATIVE CHOLANGIOGRAM N/A 2017    Procedure: CHOLECYSTECTOMY LAPAROSCOPIC INTRAOPERATIVE CHOLANGIOGRAM;  Surgeon: Molina Smith MD;  Location: [facility] OR [facility];  Service:    • COLONOSCOPY N/A 2016    Procedure: COLONOSCOPY TO CECUM WITH HOT SNARE POLYPECTOMY AND CLIPx1;  Surgeon: Molina Smith MD;  Location: [facility] ENDOSCOPY;  Service:    • ENDOSCOPY N/A 2016    Procedure: ESOPHAGOGASTRODUODENOSCOPY;  Surgeon: Molina Smith MD;  Location: [facility] ENDOSCOPY;  Service:    • HYSTERECTOMY     • OOPHORECTOMY     • SINUS SURGERY     • TONSILLECTOMY         Visit Dx:     ICD-10-CM ICD-9-CM   1. Chronic pain of both knees M25.561 719.46    M25.562 338.29    G89.29    2. Chronic bilateral low back pain,  with sciatica presence unspecified M54.5 724.2    G89.29 338.29                           Therapy Education  Given: HEP, Symptoms/condition management  Program: Reinforced  How Provided: Verbal, Demonstration  Provided to: Patient  Level of Understanding: Teach back education performed, Verbalized, Demonstrated           PT OP Goals     Row Name 04/09/18 1000          PT Short Term Goals    STG Date to Achieve 03/12/18  -LS     STG 1 Pt will demonstrate understanding and compliance with initial HEP.  -LS     STG 1 Progress Met  -LS     STG 2 Pt will demonstrate appropriate STS mechanics with reduced knee pain by 50%.  -LS     STG 2 Progress Met  -LS     STG 2 Progress Comments Pt reports minimal pain with STS>  -LS     STG 3 Pt will demonstrate PPT x 10 in supine position without exacerbation of knee pain.  -LS     STG 3 Progress Met  -LS        Long Term Goals    LTG Date to Achieve 03/28/18  -LS     LTG 1 Pt will demonstrate 0 episodes of loss of balance with transfers in clinic  and report no falls since initiation of therapy.  -LS     LTG 1 Progress Met  -LS     LTG 2 Pt will report pain at worse in knees decreased from 6/10 to 4/10 or better.  -LS     LTG 2 Progress Ongoing  -LS     LTG 2 Progress Comments 8/10 at worse.  -LS     LTG 3 Pt will report improved tolerance to cooking from 5 minutes to 15 minutes without exacerbation of pain.  -LS     LTG 3 Progress Ongoing  -LS     LTG 3 Progress Comments Pt reports intermittent improved tolerance to standing.  -LS       User Key  (r) = Recorded By, (t) = Taken By, (c) = Cosigned By    Initials Name Provider Type    LS Michelle Osman, PT Physical Therapist                PT Assessment/Plan     Row Name 04/09/18 1117          PT Assessment    Functional Limitations Limitation in home management;Limitations in community activities;Performance in work activities;Performance in leisure activities;Limitations in functional capacity and performance;Impaired gait  -      Impairments Posture;Poor body mechanics;Pain;Muscle strength;Range of motion;Gait;Joint integrity;Balance  -LS     Assessment Comments Pt has attended 9 sessions of skilled PT to address B knee pain with possible lumbar radiculopathy. She demonstrates intermittent improvement in symptoms with reports of dec frequency of 8/10 pain. She demonstrates improved STS mechanics without reports of pain in transitional movements. She continues to demonstrate dec tolerance to prolonged walking and standing but has some days where pain is tolerable. She demonstrates good understanding of exercises and is progressing well with strengthening activities. Her self reported KOS disability score has reduced from 49% to 25% disability. She will benefit from 1-2 more weeks of skilled PT to finalize HEP and allow for d/c to independent progression. Will see rheumatolgist in June to follow up.   -LS     Please refer to paper survey for additional self-reported information Yes  -LS     Rehab Potential Good  -LS     Patient/caregiver participated in establishment of treatment plan and goals Yes  -LS     Patient would benefit from skilled therapy intervention Yes  -LS        PT Plan    PT Frequency 2x/week;1x/week  -LS     Predicted Duration of Therapy Intervention (OT Eval) 3 weeks   -LS     Planned CPT's? PT EVAL LOW COMPLEXITY: 55833;PT THER ACT EA 15 MIN: 75249;PT THER PROC EA 15 MIN: 00346;PT HOT OR COLD PACK TREAT MCARE;PT TRACTION LUMBAR: 45288;PT HOT/COLD PACK WC NONMCARE: 51469  -LS     PT Plan Comments Continue to finalize HEP, lumbar traction if beneficial.   -LS       User Key  (r) = Recorded By, (t) = Taken By, (c) = Cosigned By    Initials Name Provider Type    LS Michelle Osman, PT Physical Therapist                Modalities     Row Name 04/09/18 1000             Moist Heat    Location lumbar spine with traction  -LS      Rx Minutes 10 mins  -LS         Traction 05251    Traction Type Lumbar  -LS      Rx Minutes 10  -LS       Duration Intermittent  -LS      Position Hook-lying  -LS      Weight 60   /40  -LS      Hold 45  -LS      Relax 15  -LS        User Key  (r) = Recorded By, (t) = Taken By, (c) = Cosigned By    Initials Name Provider Type    DIAN Osman PT Physical Therapist              Exercises     Row Name 04/09/18 1000             Subjective Comments    Subjective Comments My knee is sometimes better sometimes worse. I do think it has been better since I started therapy.  -LS         Subjective Pain    Able to rate subjective pain? yes  -LS      Pre-Treatment Pain Level 5  -LS         Exercise 1    Exercise Name 1 calf raise  -LS      Reps 1 20  -LS         Exercise 2    Exercise Name 2 PPT  -LS      Cueing 2 Verbal;Tactile  -LS      Reps 2 15  -LS         Exercise 4    Exercise Name 4 HL hip abd B  -LS      Sets 4 2  -LS      Reps 4 10  -LS      Additional Comments GTB  -LS         Exercise 6    Exercise Name 6 Nustep, L6 with UEs and TA  -LS      Time 6 5  -LS         Exercise 7    Exercise Name 7 QS into blue ball seated  -LS      Reps 7 15  -LS      Additional Comments B  -LS      Cueing 7 Tactile  -LS         Exercise 8    Exercise Name 8 SAQ  -LS      Reps 8 20  -LS      Additional Comments 5#  -LS      Cueing 8 Demo  -LS         Exercise 9    Exercise Name 9 bridge  -LS      Reps 9 15  -LS      Time 9 3  -LS      Cueing 9 Verbal  -LS         Exercise 11    Exercise Name 11 B shoulder extension  -LS      Sets 11 2  -LS      Reps 11 10  -LS      Additional Comments RTB/ B/alternating  -LS         Exercise 12    Exercise Name 12 stair HS stretch  -LS      Reps 12 3  -LS      Time 12 20  -LS         Exercise 13    Exercise Name 13 stair gastroc stretch  -LS      Reps 13 3  -LS      Time 13 20  -LS        User Key  (r) = Recorded By, (t) = Taken By, (c) = Cosigned By    Initials Name Provider Type    DIAN Osman PT Physical Therapist                        Outcome Measure Options: Knee Outcome Score- ADL  Knee  Outcome Score  Knee Outcome Score Comments: 25% disability      Time Calculation:   Start Time: 1030  Stop Time: 1115  Time Calculation (min): 45 min     Therapy Charges for Today     Code Description Service Date Service Provider Modifiers Qty    15301511254 HC PT HOT OR COLD PACK TREAT MCARE 4/9/2018 Michelle Osman, PT GP 1    20914101899 HC PT THER PROC EA 15 MIN 4/9/2018 Michelle Osman, PT GP 2    72641186095 HC PT TRACTION LUMBAR 4/9/2018 Michelle Osman, PT GP 1    41925385258 HC PT MOBILITY CURRENT 4/9/2018 Michelle Osman, PT GP, CJ 1    52094784813 HC PT MOBILITY PROJECTED 4/9/2018 Michelle Osman, PT GP, CI 1          PT G-Codes  PT Professional Judgement Used?: Yes  Outcome Measure Options: Knee Outcome Score- ADL  Score: 25% disability   Mobility: Walking and Moving Around Current Status (): At least 20 percent but less than 40 percent impaired, limited or restricted  Mobility: Walking and Moving Around Goal Status (): At least 1 percent but less than 20 percent impaired, limited or restricted         Michelle Osman, PT  4/9/2018

## 2018-04-12 ENCOUNTER — HOSPITAL ENCOUNTER (OUTPATIENT)
Dept: PHYSICAL THERAPY | Facility: HOSPITAL | Age: 53
Setting detail: THERAPIES SERIES
Discharge: HOME | End: 2018-04-12

## 2018-04-12 DIAGNOSIS — M25.562 CHRONIC PAIN OF BOTH KNEES: Primary | ICD-10-CM

## 2018-04-12 DIAGNOSIS — M25.561 CHRONIC PAIN OF BOTH KNEES: Primary | ICD-10-CM

## 2018-04-12 DIAGNOSIS — M54.5 CHRONIC BILATERAL LOW BACK PAIN, WITH SCIATICA PRESENCE UNSPECIFIED: ICD-10-CM

## 2018-04-12 DIAGNOSIS — G89.29 CHRONIC BILATERAL LOW BACK PAIN, WITH SCIATICA PRESENCE UNSPECIFIED: ICD-10-CM

## 2018-04-12 DIAGNOSIS — G89.29 CHRONIC PAIN OF BOTH KNEES: Primary | ICD-10-CM

## 2018-04-12 PROCEDURE — 97110 THERAPEUTIC EXERCISES: CPT

## 2018-04-12 PROCEDURE — G8979 MOBILITY GOAL STATUS: HCPCS

## 2018-04-12 PROCEDURE — 97012 MECHANICAL TRACTION THERAPY: CPT

## 2018-04-12 PROCEDURE — G8978 MOBILITY CURRENT STATUS: HCPCS

## 2018-04-12 NOTE — THERAPY DISCHARGE NOTE
Outpatient Physical Therapy Ortho Treatment Note/Discharge Summary  [facility]     Patient Name: Braydon Soria  : 1965  MRN: [id number]  Today's Date: 2018      Visit Date: 2018    Visit Dx:    ICD-10-CM ICD-9-CM   1. Chronic pain of both knees M25.561 719.46    M25.562 338.29    G89.29    2. Chronic bilateral low back pain, with sciatica presence unspecified M54.5 724.2    G89.29 338.29       Patient Active Problem List   Diagnosis   • Midline low back pain with left-sided sciatica   • Hyperreflexia   • Incomplete bladder emptying   • Thyroid enlargement   • BP (high blood pressure)   • Chronic bilateral low back pain with bilateral sciatica   • DDD (degenerative disc disease), lumbar   • Lumbar spondylolysis   • Atrophy of muscle of lower leg   • Arthritis of both hands   • Patellar tendonitis of both knees   • Chondromalacia of patella        Past Medical History:   Diagnosis Date   • Anxiety    • Arthritis    • Chest pressure    • Depression    • Dyspnea    • Gallbladder disease     gallstones   • Hormone replacement therapy (HRT)    • Hypercholesterolemia    • Hyperlipidemia    • Hypertension    • Insomnia    • Low back pain    • Lumbosacral disc disease    • Migraines    • Peripheral neuropathy    • SOB (shortness of breath)    • Thyroid nodule         Past Surgical History:   Procedure Laterality Date   •  SECTION      CHILDREN WERE BORN IN [facility]   • CHOLECYSTECTOMY WITH INTRAOPERATIVE CHOLANGIOGRAM N/A 2017    Procedure: CHOLECYSTECTOMY LAPAROSCOPIC INTRAOPERATIVE CHOLANGIOGRAM;  Surgeon: Molina Smtih MD;  [place] [facility] [place] [facility] [place]  Service:    • COLONOSCOPY N/A 2016    Procedure: COLONOSCOPY TO CECUM WITH HOT SNARE POLYPECTOMY AND CLIPx1;  Surgeon: Molina Smith MD;  Location: [facility] ENDOSCOPY;  Service:    • ENDOSCOPY N/A 2016    Procedure: ESOPHAGOGASTRODUODENOSCOPY;  Surgeon: Molina Smith MD;  Location: [facility] ENDOSCOPY;   Service:    • HYSTERECTOMY     • OOPHORECTOMY     • SINUS SURGERY     • TONSILLECTOMY                               PT Assessment/Plan     Row Name 04/12/18 1026          PT Assessment    Assessment Comments Pt with limited progress with skilled PT and continues to report high levels of pain in B knees L > R. She enters PT today with antalgic gait pattern and LLE dragging due to dec ability to WB. Have decided to hold PT at this time as it does not seem to be improving symptoms. Pt understands HEP and demonstrates understanding of independent management to attempt to continue strengthening as needed until rheumatology follow up in [place].   -LS        PT Plan    PT Plan Comments d/c today with HEP; await rheumatology follow up.   -LS       User Key  (r) = Recorded By, (t) = Taken By, (c) = Cosigned By    Initials Name Provider Type    LS Michelle Osman, PT Physical Therapist                    Exercises     Row Name 04/12/18 1000             Subjective Comments    Subjective Comments Pain is very high today. I haven't been able to sleep.  -LS         Subjective Pain    Able to rate subjective pain? yes  -LS      Pre-Treatment Pain Level 8  -LS         Exercise 1    Exercise Name 1 calf raise  -LS      Reps 1 20  -LS         Exercise 2    Exercise Name 2 PPT  -LS      Cueing 2 Verbal;Tactile  -LS      Reps 2 15  -LS         Exercise 4    Exercise Name 4 HL hip abd B  -LS      Sets 4 2  -LS      Reps 4 10  -LS      Additional Comments GTB B/uni  -LS         Exercise 6    Exercise Name 6 Nustep, L6 with UEs and TA  -LS      Time 6 5  -LS         Exercise 7    Exercise Name 7 QS into blue ball seated  -LS      Reps 7 15  -LS      Additional Comments B  -LS      Cueing 7 Tactile  -LS         Exercise 9    Exercise Name 9 bridge  -LS      Reps 9 15  -LS      Time 9 3  -LS      Cueing 9 Verbal  -LS         Exercise 10    Exercise Name 10 LTR blue ball  -LS      Reps 10 20  -LS         Exercise 11    Exercise Name 11 B  shoulder extension  -LS      Sets 11 2  -LS      Reps 11 10  -LS      Additional Comments RTB/ B/alternating  -LS         Exercise 12    Exercise Name 12 stair HS stretch  -LS      Reps 12 3  -LS      Time 12 20  -LS         Exercise 13    Exercise Name 13 stair gastroc stretch  -LS      Reps 13 3  -LS      Time 13 20  -LS         Exercise 14    Exercise Name 14 blue ball roll in HS curl  -LS      Reps 14 15  -LS        User Key  (r) = Recorded By, (t) = Taken By, (c) = Cosigned By    Initials Name Provider Type    DIAN Osman PT Physical Therapist                               PT OP Goals     Row Name 04/12/18 1000          PT Short Term Goals    STG Date to Achieve 03/12/18  -LS     STG 1 Pt will demonstrate understanding and compliance with initial HEP.  -LS     STG 1 Progress Met  -LS     STG 2 Pt will demonstrate appropriate STS mechanics with reduced knee pain by 50%.  -LS     STG 2 Progress Met  -LS     STG 3 Pt will demonstrate PPT x 10 in supine position without exacerbation of knee pain.  -LS     STG 3 Progress Met  -LS        Long Term Goals    LTG Date to Achieve 03/28/18  -LS     LTG 1 Pt will demonstrate 0 episodes of loss of balance with transfers in clinic  and report no falls since initiation of therapy.  -LS     LTG 1 Progress Met  -LS     LTG 2 Pt will report pain at worse in knees decreased from 6/10 to 4/10 or better.  -LS     LTG 2 Progress Not Met  -LS     LTG 2 Progress Comments Continued high levels of pain. 8/10 today.  -LS     LTG 3 Pt will report improved tolerance to cooking from 5 minutes to 15 minutes without exacerbation of pain.  -LS     LTG 3 Progress Not Met  -LS     LTG 3 Progress Comments Pt continues to report intermittent high levels of pain.  -LS       User Key  (r) = Recorded By, (t) = Taken By, (c) = Cosigned By    Initials Name Provider Type    DIAN Osman PT Physical Therapist          Therapy Education  Education Details: discussed holding PT to follow up with  Rheumatolgist with continued performance of HEP when knee pain is low to continue strengthening.   Given: Symptoms/condition management  Program: Reinforced  How Provided: Verbal, Demonstration  Provided to: Patient  Level of Understanding: Teach back education performed, Verbalized, Demonstrated              Time Calculation:   Start Time: 0945  Stop Time: 1030  Time Calculation (min): 45 min    Therapy Charges for Today     Code Description Service Date Service Provider Modifiers Qty    84919902452 HC PT MOBILITY CURRENT 4/12/2018 Michelle Osman, PT GP, CJ 1    75598325126 HC PT MOBILITY PROJECTED 4/12/2018 Michelle Osman, PT GP, CI 1    40134738078 HC PT THER PROC EA 15 MIN 4/12/2018 Michelle Omsan, PT GP 1    72348307028 HC PT TRACTION LUMBAR 4/12/2018 Michelle Osman, PT GP 2    59756613972 HC PT HOT OR COLD PACK TREAT MCARE 4/12/2018 Michelle Osman, PT GP 1          PT G-Codes  PT Professional Judgement Used?: Yes  Mobility: Walking and Moving Around Current Status (): At least 20 percent but less than 40 percent impaired, limited or restricted  Mobility: Walking and Moving Around Goal Status (): At least 1 percent but less than 20 percent impaired, limited or restricted     OP PT Discharge Summary  Date of Discharge: 04/12/18  Reason for Discharge: Independent, Lack of progress  Outcomes Achieved: Patient able to partially acheive established goals  Discharge Destination: Home with home program  Discharge Instructions/Additional Comments: see assessment      Michelle Osman, PT  4/12/2018

## 2018-04-16 ENCOUNTER — APPOINTMENT (OUTPATIENT)
Dept: PHYSICAL THERAPY | Facility: HOSPITAL | Age: 53
End: 2018-04-16

## 2018-04-17 ENCOUNTER — OFFICE VISIT (OUTPATIENT)
Dept: CARDIOLOGY | Facility: CLINIC | Age: 53
End: 2018-04-17

## 2018-04-17 VITALS
SYSTOLIC BLOOD PRESSURE: 130 MMHG | HEART RATE: 74 BPM | WEIGHT: 186 LBS | DIASTOLIC BLOOD PRESSURE: 94 MMHG | RESPIRATION RATE: 16 BRPM | BODY MASS INDEX: 30.99 KG/M2 | HEIGHT: 65 IN

## 2018-04-17 DIAGNOSIS — I10 ESSENTIAL HYPERTENSION: ICD-10-CM

## 2018-04-17 DIAGNOSIS — R00.2 PALPITATIONS: Primary | ICD-10-CM

## 2018-04-17 PROCEDURE — 93000 ELECTROCARDIOGRAM COMPLETE: CPT | Performed by: NURSE PRACTITIONER

## 2018-04-17 PROCEDURE — 99214 OFFICE O/P EST MOD 30 MIN: CPT | Performed by: NURSE PRACTITIONER

## 2018-04-17 RX ORDER — METOPROLOL SUCCINATE 50 MG
75 TABLET, EXTENDED RELEASE 24 HR ORAL DAILY
Qty: 90 TABLET | Refills: 11 | Status: SHIPPED | OUTPATIENT
Start: 2018-04-17 | End: 2018-11-30

## 2018-04-17 NOTE — PROGRESS NOTES
Date of Office Visit: 2018  Encounter Provider: DOTTIE Hurst  Place of Service: [facility] CARDIOLOGY  Patient Name: Braydon Soria  :1965    Chief Complaint   Patient presents with   • Palpitations   :     HPI: Braydon Soria is a 52 y.o. female comes in today for Yearly follow-up.  She is a patient of Dr. Castillo.  I am seeing her for the first time today and have reviewed her records.  She speaks Colombian and is accompanied by her son.      She has a history of hypertension.      In 2013, she had an exercise echocardiogram showing normal left ventricular systolic function at rest with an ejection fraction of 63% and no significant valve disease.  The stress portion of her test was normal.  She was having leg pain and had ankle brachial indices in  that were normal.    She came in last year in , as she had an abnormal electrocardiogram and was getting her gallbladder out the next day.  She had a T-wave inversion in Lead V3 but was felt to be a normal variant.  No further cardiac testing was completed.  Her blood pressure was a little bit high and she was to follow up with her primary care provider.    Today, she comes in for follow-up.  She reports that she has been having some palpitations at nighttime.  They wake her up in the middle of the night.  She does snore.  She denies any chest pain or chest pressure.  The last episode of the palpitations was over a month ago.  She denies any shortness of breath other than related to her sinuses.  She has some minor swelling in her legs related to her knee problem.  She does have some fatigue.      Past Medical History:   Diagnosis Date   • Anxiety    • Arthritis    • Chest pressure    • Depression    • Dyspnea    • Gallbladder disease     gallstones   • Hormone replacement therapy (HRT)    • Hypercholesterolemia    • Hyperlipidemia    • Hypertension    • Insomnia    • Low back pain    • Lumbosacral disc  "disease    • Migraines    • Peripheral neuropathy    • SOB (shortness of breath)    • Thyroid nodule        Past Surgical History:   Procedure Laterality Date   •  SECTION      CHILDREN WERE BORN IN [facility]   • CHOLECYSTECTOMY WITH INTRAOPERATIVE CHOLANGIOGRAM N/A 2017    Procedure: CHOLECYSTECTOMY LAPAROSCOPIC INTRAOPERATIVE CHOLANGIOGRAM;  Surgeon: Molina Smith MD;  Location:  [facility];  Service:    • COLONOSCOPY N/A 2016    Procedure: COLONOSCOPY TO CECUM WITH HOT SNARE POLYPECTOMY AND CLIPx1;  Surgeon: Molina Smith MD;  Location: [facility] ENDOSCOPY;  Service:    • ENDOSCOPY N/A 2016    Procedure: ESOPHAGOGASTRODUODENOSCOPY;  Surgeon: Molina Smith MD;  Location: [facility] ENDOSCOPY;  Service:    • HYSTERECTOMY     • OOPHORECTOMY     • SINUS SURGERY     • TONSILLECTOMY             Review of Systems   Constitution: Positive for malaise/fatigue.   Cardiovascular: Positive for dyspnea on exertion and palpitations.   Respiratory: Positive for cough.      All other systems reviewed and are negative    No Known Allergies    All aspects of family and social history reviewed.           Objective:     Vitals:    18 1054   BP: 130/94   BP Location: Left arm   Patient Position: Sitting   Cuff Size: Adult   Pulse: 74   Resp: 16   Weight: 84.4 kg (186 lb)   Height: 165.1 cm (65\")     Body mass index is 30.95 kg/m².    PHYSICAL EXAM:  Physical Exam   Constitutional: She appears well-developed.   HENT:   Head: Normocephalic and atraumatic.   Eyes: Conjunctivae and lids are normal. Pupils are equal, round, and reactive to light. Lids are everted and swept, no foreign bodies found.   Neck: Normal range of motion. No JVD present. Carotid bruit is not present. No tracheal deviation present. No thyroid mass present.   Cardiovascular: Normal rate, regular rhythm and normal heart sounds.    Pulses:       Dorsalis pedis pulses are 2+ on the right side, and 2+ on the left side. "   Pulmonary/Chest: Effort normal and breath sounds normal.   Abdominal: Normal appearance and bowel sounds are normal.   Musculoskeletal: Normal range of motion.   Neurological: She is alert. She has normal strength.   Skin: Skin is warm, dry and intact.   Psychiatric: She has a normal mood and affect. Her behavior is normal.   Vitals reviewed.        ECG 12 Lead  Date/Time: 4/17/2018 2:09 PM  Performed by: TOÑA ROSALES  Authorized by: TOÑA ROSALES   Comparison: compared with previous ECG from 1/9/2018  Similar to previous ECG  Rhythm: sinus rhythm  Rate: normal  BPM: 74  Conduction: conduction normal  ST Segments: ST segments normal  T Waves: T waves normal  QRS axis: normal  Other: no other findings  Clinical impression: normal ECG  Comments: Indication: palpitations                Assessment:       Diagnosis Plan   1. Palpitations  Adult Transthoracic Echo Complete W/ Cont if Necessary Per Protocol    ECG 12 Lead   2. Essential hypertension          Orders Placed This Encounter   Procedures   • ECG 12 Lead     This order was created via procedure documentation   • Adult Transthoracic Echo Complete W/ Cont if Necessary Per Protocol     Standing Status:   Future     Order Specific Question:   Reason for exam?     Answer:   Palpitations       Current Outpatient Prescriptions   Medication Sig Dispense Refill   • amitriptyline (ELAVIL) 100 MG tablet Take 100 mg by mouth Every Night.     • amLODIPine (NORVASC) 5 MG tablet Take 5 mg by mouth Daily.  1   • citalopram (CeleXA) 40 MG tablet Take 40 mg by mouth Daily.     • cyclobenzaprine (FLEXERIL) 5 MG tablet TAKE ONE TABLET BY MOUTH TWICE DAILY AS NEEDED FOR MUSCLE SPASM (PAIN) 60 tablet 1   • diclofenac (VOLTAREN) 1 % gel gel Apply 4 g topically 2 (Two) Times a Day. Use per pkg insert 100 g 2   • LORazepam (ATIVAN) 0.5 MG tablet Take 0.5 mg by mouth Every 8 (Eight) Hours As Needed.     • mirtazapine (REMERON) 7.5 MG tablet Take 7.5 mg by mouth Every Night.      • prazosin (MINIPRESS) 5 MG capsule Take 5 mg by mouth Every Night.     • TOPROL XL 50 MG 24 hr tablet Take 1.5 tablets by mouth Daily. 90 tablet 11     No current facility-administered medications for this visit.             Plan:       1. PalpitationsOne episode of palpitations that she recorded over a month ago.  Occurred during the middle of the night.  I would be concerned that this is related to sleep apnea.  She does report that she snores.  We'll check an echo to look at the right side of her heart.  If there is any abnormality, will refer for sleep apnea.  May need a Holter monitor if this persist.  Increase Toprol to 75 mg daily.    2.  Hypertension-increase Toprol to 75 mg daily    Follow up in office in one year. Will call with echo results    As always, it has been a pleasure to participate in this patient's care.      Sincerely,      DOTTIE Hurst

## 2018-04-18 DIAGNOSIS — G60.9 IDIOPATHIC PERIPHERAL NEUROPATHY: ICD-10-CM

## 2018-04-19 ENCOUNTER — APPOINTMENT (OUTPATIENT)
Dept: PHYSICAL THERAPY | Facility: HOSPITAL | Age: 53
End: 2018-04-19

## 2018-04-20 ENCOUNTER — TELEPHONE (OUTPATIENT)
Dept: CARDIOLOGY | Facility: CLINIC | Age: 53
End: 2018-04-20

## 2018-04-20 ENCOUNTER — HOSPITAL ENCOUNTER (OUTPATIENT)
Dept: CARDIOLOGY | Facility: HOSPITAL | Age: 53
Discharge: HOME | End: 2018-04-20
Admitting: NURSE PRACTITIONER

## 2018-04-20 VITALS
SYSTOLIC BLOOD PRESSURE: 140 MMHG | HEART RATE: 80 BPM | DIASTOLIC BLOOD PRESSURE: 82 MMHG | HEIGHT: 65 IN | WEIGHT: 186 LBS | BODY MASS INDEX: 30.99 KG/M2

## 2018-04-20 DIAGNOSIS — R00.2 PALPITATIONS: Primary | ICD-10-CM

## 2018-04-20 DIAGNOSIS — I10 ESSENTIAL HYPERTENSION: ICD-10-CM

## 2018-04-20 DIAGNOSIS — R93.1 ABNORMAL ECHOCARDIOGRAM: ICD-10-CM

## 2018-04-20 DIAGNOSIS — R00.2 PALPITATIONS: ICD-10-CM

## 2018-04-20 LAB
ASCENDING AORTA: 3 CM
BH CV ECHO MEAS - ACS: 2.2 CM
BH CV ECHO MEAS - AO MAX PG (FULL): 1.7 MMHG
BH CV ECHO MEAS - AO MAX PG: 5.4 MMHG
BH CV ECHO MEAS - AO MEAN PG (FULL): 0.89 MMHG
BH CV ECHO MEAS - AO MEAN PG: 3 MMHG
BH CV ECHO MEAS - AO ROOT AREA (BSA CORRECTED): 1.7
BH CV ECHO MEAS - AO ROOT AREA: 8.6 CM^2
BH CV ECHO MEAS - AO ROOT DIAM: 3.3 CM
BH CV ECHO MEAS - AO V2 MAX: 116.4 CM/SEC
BH CV ECHO MEAS - AO V2 MEAN: 80 CM/SEC
BH CV ECHO MEAS - AO V2 VTI: 23.9 CM
BH CV ECHO MEAS - AVA(I,A): 2.4 CM^2
BH CV ECHO MEAS - AVA(I,D): 2.4 CM^2
BH CV ECHO MEAS - AVA(V,A): 2.2 CM^2
BH CV ECHO MEAS - AVA(V,D): 2.2 CM^2
BH CV ECHO MEAS - BSA(HAYCOCK): 2 M^2
BH CV ECHO MEAS - BSA: 1.9 M^2
BH CV ECHO MEAS - BZI_BMI: 31 KILOGRAMS/M^2
BH CV ECHO MEAS - BZI_METRIC_HEIGHT: 165.1 CM
BH CV ECHO MEAS - BZI_METRIC_WEIGHT: 84.4 KG
BH CV ECHO MEAS - CONTRAST EF (2CH): 62.8 ML/M^2
BH CV ECHO MEAS - CONTRAST EF 4CH: 58.8 ML/M^2
BH CV ECHO MEAS - EDV(MOD-SP2): 78 ML
BH CV ECHO MEAS - EDV(MOD-SP4): 85 ML
BH CV ECHO MEAS - EDV(TEICH): 103.6 ML
BH CV ECHO MEAS - EF(CUBED): 60.7 %
BH CV ECHO MEAS - EF(MOD-BP): 61 %
BH CV ECHO MEAS - EF(MOD-SP2): 62.8 %
BH CV ECHO MEAS - EF(MOD-SP4): 58.8 %
BH CV ECHO MEAS - EF(TEICH): 52.2 %
BH CV ECHO MEAS - ESV(MOD-SP2): 29 ML
BH CV ECHO MEAS - ESV(MOD-SP4): 35 ML
BH CV ECHO MEAS - ESV(TEICH): 49.5 ML
BH CV ECHO MEAS - FS: 26.8 %
BH CV ECHO MEAS - IVS/LVPW: 0.96
BH CV ECHO MEAS - IVSD: 0.8 CM
BH CV ECHO MEAS - LAT PEAK E' VEL: 12 CM/SEC
BH CV ECHO MEAS - LV DIASTOLIC VOL/BSA (35-75): 44.3 ML/M^2
BH CV ECHO MEAS - LV MASS(C)D: 126.4 GRAMS
BH CV ECHO MEAS - LV MASS(C)DI: 65.9 GRAMS/M^2
BH CV ECHO MEAS - LV MAX PG: 3.7 MMHG
BH CV ECHO MEAS - LV MEAN PG: 2.1 MMHG
BH CV ECHO MEAS - LV SYSTOLIC VOL/BSA (12-30): 18.2 ML/M^2
BH CV ECHO MEAS - LV V1 MAX: 96 CM/SEC
BH CV ECHO MEAS - LV V1 MEAN: 70.3 CM/SEC
BH CV ECHO MEAS - LV V1 VTI: 21 CM
BH CV ECHO MEAS - LVIDD: 4.7 CM
BH CV ECHO MEAS - LVIDS: 3.5 CM
BH CV ECHO MEAS - LVLD AP2: 7.6 CM
BH CV ECHO MEAS - LVLD AP4: 7.3 CM
BH CV ECHO MEAS - LVLS AP2: 6.2 CM
BH CV ECHO MEAS - LVLS AP4: 6.3 CM
BH CV ECHO MEAS - LVOT AREA (M): 2.8 CM^2
BH CV ECHO MEAS - LVOT AREA: 2.7 CM^2
BH CV ECHO MEAS - LVOT DIAM: 1.9 CM
BH CV ECHO MEAS - LVPWD: 0.83 CM
BH CV ECHO MEAS - MED PEAK E' VEL: 9 CM/SEC
BH CV ECHO MEAS - MV A DUR: 0.13 SEC
BH CV ECHO MEAS - MV A MAX VEL: 72.8 CM/SEC
BH CV ECHO MEAS - MV DEC SLOPE: 358.6 CM/SEC^2
BH CV ECHO MEAS - MV DEC TIME: 0.2 SEC
BH CV ECHO MEAS - MV E MAX VEL: 73.7 CM/SEC
BH CV ECHO MEAS - MV E/A: 1
BH CV ECHO MEAS - MV MAX PG: 2.9 MMHG
BH CV ECHO MEAS - MV MEAN PG: 1.7 MMHG
BH CV ECHO MEAS - MV P1/2T MAX VEL: 74.2 CM/SEC
BH CV ECHO MEAS - MV P1/2T: 60.6 MSEC
BH CV ECHO MEAS - MV V2 MAX: 84.5 CM/SEC
BH CV ECHO MEAS - MV V2 MEAN: 60 CM/SEC
BH CV ECHO MEAS - MV V2 VTI: 23.9 CM
BH CV ECHO MEAS - MVA P1/2T LCG: 3 CM^2
BH CV ECHO MEAS - MVA(P1/2T): 3.6 CM^2
BH CV ECHO MEAS - MVA(VTI): 2.4 CM^2
BH CV ECHO MEAS - PA ACC TIME: 0.13 SEC
BH CV ECHO MEAS - PA MAX PG (FULL): 1.7 MMHG
BH CV ECHO MEAS - PA MAX PG: 3 MMHG
BH CV ECHO MEAS - PA PR(ACCEL): 18.8 MMHG
BH CV ECHO MEAS - PA V2 MAX: 86.9 CM/SEC
BH CV ECHO MEAS - PULM A REVS DUR: 0.1 SEC
BH CV ECHO MEAS - PULM A REVS VEL: 35.9 CM/SEC
BH CV ECHO MEAS - PULM DIAS VEL: 38.3 CM/SEC
BH CV ECHO MEAS - PULM S/D: 1.7
BH CV ECHO MEAS - PULM SYS VEL: 65 CM/SEC
BH CV ECHO MEAS - PVA(V,A): 2.1 CM^2
BH CV ECHO MEAS - PVA(V,D): 2.1 CM^2
BH CV ECHO MEAS - QP/QS: 0.67
BH CV ECHO MEAS - RAP SYSTOLE: 8 MMHG
BH CV ECHO MEAS - RV MAX PG: 1.3 MMHG
BH CV ECHO MEAS - RV MEAN PG: 0.72 MMHG
BH CV ECHO MEAS - RV V1 MAX: 57.7 CM/SEC
BH CV ECHO MEAS - RV V1 MEAN: 39.3 CM/SEC
BH CV ECHO MEAS - RV V1 VTI: 11.9 CM
BH CV ECHO MEAS - RVOT AREA: 3.2 CM^2
BH CV ECHO MEAS - RVOT DIAM: 2 CM
BH CV ECHO MEAS - RVSP: 21 MMHG
BH CV ECHO MEAS - SI(AO): 106.8 ML/M^2
BH CV ECHO MEAS - SI(CUBED): 33.4 ML/M^2
BH CV ECHO MEAS - SI(LVOT): 29.5 ML/M^2
BH CV ECHO MEAS - SI(MOD-SP2): 25.5 ML/M^2
BH CV ECHO MEAS - SI(MOD-SP4): 26.1 ML/M^2
BH CV ECHO MEAS - SI(TEICH): 28.2 ML/M^2
BH CV ECHO MEAS - SUP REN AO DIAM: 2.1 CM
BH CV ECHO MEAS - SV(AO): 204.8 ML
BH CV ECHO MEAS - SV(CUBED): 64 ML
BH CV ECHO MEAS - SV(LVOT): 56.6 ML
BH CV ECHO MEAS - SV(MOD-SP2): 49 ML
BH CV ECHO MEAS - SV(MOD-SP4): 50 ML
BH CV ECHO MEAS - SV(RVOT): 37.9 ML
BH CV ECHO MEAS - SV(TEICH): 54.1 ML
BH CV ECHO MEAS - TAPSE (>1.6): 2.2 CM2
BH CV ECHO MEAS - TR MAX VEL: 182.4 CM/SEC
BH CV ECHO MEASUREMENTS AVERAGE E/E' RATIO: 7.5
BH CV XLRA - RV BASE: 2.6 CM
BH CV XLRA - TDI S': 15 CM/SEC
LEFT ATRIUM VOLUME INDEX: 18 ML/M2
SINUS: 3.1 CM
STJ: 2.4 CM

## 2018-04-20 PROCEDURE — 93306 TTE W/DOPPLER COMPLETE: CPT | Performed by: INTERNAL MEDICINE

## 2018-04-20 PROCEDURE — 93306 TTE W/DOPPLER COMPLETE: CPT

## 2018-04-20 NOTE — TELEPHONE ENCOUNTER
Abnormal echocardiogram. Discussed with Dr. Castillo. Will order pt to have a nuclear stress test to evaluate for ischemia. [place] is leaving the country on [place]. Will need to have test on Monday or Tuesday. Discussed with pt son.

## 2018-04-22 ENCOUNTER — HOSPITAL ENCOUNTER (EMERGENCY)
Facility: HOSPITAL | Age: 53
Discharge: HOME | End: 2018-04-22
Attending: EMERGENCY MEDICINE | Admitting: EMERGENCY MEDICINE

## 2018-04-22 VITALS
HEART RATE: 88 BPM | SYSTOLIC BLOOD PRESSURE: 163 MMHG | OXYGEN SATURATION: 98 % | BODY MASS INDEX: 29.99 KG/M2 | RESPIRATION RATE: 20 BRPM | WEIGHT: 180 LBS | TEMPERATURE: 98.6 F | HEIGHT: 65 IN | DIASTOLIC BLOOD PRESSURE: 91 MMHG

## 2018-04-22 DIAGNOSIS — M76.892 TENDONITIS OF KNEE, LEFT: Primary | ICD-10-CM

## 2018-04-22 PROCEDURE — 99283 EMERGENCY DEPT VISIT LOW MDM: CPT

## 2018-04-22 RX ORDER — METHYLPREDNISOLONE 4 MG/1
TABLET ORAL
Qty: 1 EACH | Refills: 0 | Status: SHIPPED | OUTPATIENT
Start: 2018-04-22 | End: 2018-11-30

## 2018-04-22 RX ORDER — IBUPROFEN 800 MG/1
800 TABLET ORAL ONCE
Status: COMPLETED | OUTPATIENT
Start: 2018-04-22 | End: 2018-04-22

## 2018-04-22 RX ADMIN — IBUPROFEN 800 MG: 800 TABLET ORAL at 14:33

## 2018-04-22 NOTE — ED PROVIDER NOTES
[facility] EMERGENCY DEPARTMENT ENCOUNTER    CHIEF COMPLAINT  Chief Complaint: knee pain  History given by: patient, family  History limited by: language barrier (family translated)  [facility] Room Number: 45/45  PMD: Ramiro Carbajal MD      HPI:  Pt is a 52 y.o. female with a history of chronic lower extremity pain, who presents complaining of bilateral knee pain for the last 2 years, which has become worse recently. Pt also complains of trouble ambulating secondary to pain but denies recent fall or injury. Pt's family states that the pt's pain is worse after she stands upright after sitting for a prolonged amount of time and that her knees occasionally become stuck if she does not move her knees for an extended amount of time. Pt states that she has been in physical therapy for the last month without relief of her pain.    Onset: gradual  Duration: 2 years  Severity: moderate  Associated symptoms: trouble ambulating due to pain  Previous treatment: Pt states that she has been in physical therapy for the last month without relief of her pain.    PAST MEDICAL HISTORY  Active Ambulatory Problems     Diagnosis Date Noted   • Midline low back pain with left-sided sciatica 05/13/2016   • Hyperreflexia 05/13/2016   • Incomplete bladder emptying 05/13/2016   • Thyroid enlargement 06/03/2016   • BP (high blood pressure) 01/12/2017   • Chronic bilateral low back pain with bilateral sciatica 06/26/2017   • DDD (degenerative disc disease), lumbar 06/26/2017   • Lumbar spondylolysis 07/14/2017   • Atrophy of muscle of lower leg 01/29/2018   • Arthritis of both hands 01/29/2018   • Patellar tendonitis of both knees 01/29/2018   • Chondromalacia of patella 01/29/2018   • Palpitations 04/17/2018   • Abnormal echocardiogram 04/20/2018     Resolved Ambulatory Problems     Diagnosis Date Noted   • No Resolved Ambulatory Problems     Past Medical History:   Diagnosis Date   • Anxiety    • Arthritis    • Chest pressure    • Depression    • Dyspnea     • Gallbladder disease    • Hormone replacement therapy (HRT)    • Hypercholesterolemia    • Hyperlipidemia    • Hypertension    • Insomnia    • Low back pain    • Lumbosacral disc disease    • Migraines    • Peripheral neuropathy    • SOB (shortness of breath)    • Thyroid nodule        PAST SURGICAL HISTORY  Past Surgical History:   Procedure Laterality Date   •  SECTION      CHILDREN WERE BORN IN [facility]   • CHOLECYSTECTOMY WITH INTRAOPERATIVE CHOLANGIOGRAM N/A 2017    Procedure: CHOLECYSTECTOMY LAPAROSCOPIC INTRAOPERATIVE CHOLANGIOGRAM;  Surgeon: Molina Smith MD;  [place]  [facility] [place] [facility] [place];  Service:    • COLONOSCOPY N/A 2016    Procedure: COLONOSCOPY TO CECUM WITH HOT SNARE POLYPECTOMY AND CLIPx1;  Surgeon: Molina Smith MD;  Location: [facility] ENDOSCOPY;  Service:    • ENDOSCOPY N/A 2016    Procedure: ESOPHAGOGASTRODUODENOSCOPY;  Surgeon: Molina Smith MD;  Location: [facility] ENDOSCOPY;  Service:    • HYSTERECTOMY     • OOPHORECTOMY     • SINUS SURGERY     • TONSILLECTOMY         FAMILY HISTORY  Family History   Problem Relation Age of Onset   • Stroke Mother    • Migraines Mother    • Heart disease Father    • Heart attack Father    • Heart disease Brother    • Heart attack Brother    • Stroke Paternal Grandmother        SOCIAL HISTORY  Social History     Social History   • Marital status:      Spouse name: N/A   • Number of children: N/A   • Years of education: N/A     Occupational History   • Not on file.     Social History Main Topics   • Smoking status: Former Smoker   • Smokeless tobacco: Never Used      Comment: smokes occasionally   • Alcohol use No      Comment: daily caffiene   • Drug use: No   • Sexual activity: Defer     Other Topics Concern   • Not on file     Social History Narrative   • No narrative on file       ALLERGIES  Review of patient's allergies indicates no known allergies.    REVIEW OF SYSTEMS  Review of Systems   Constitutional: Negative  for fever.   HENT: Negative for sore throat.    Respiratory: Negative for cough and shortness of breath.    Cardiovascular: Negative for chest pain.   Gastrointestinal: Negative for abdominal pain, diarrhea and vomiting.   Genitourinary: Negative for dysuria.   Musculoskeletal: Positive for arthralgias (bilateral knees) and gait problem (due to pain). Negative for neck pain.   Skin: Negative for rash.   Neurological: Negative for weakness, numbness and headaches.   All other systems reviewed and are negative.      PHYSICAL EXAM  ED Triage Vitals   Temp Heart Rate Resp BP SpO2   04/22/18 1339 04/22/18 1339 04/22/18 1339 04/22/18 1355 04/22/18 1339   98.6 °F (37 °C) 105 16 (!) 157/102 96 %      Temp src Heart Rate Source Patient Position BP Location FiO2 (%)   04/22/18 1339 -- 04/22/18 1355 04/22/18 1355 --   Tympanic  Sitting Left arm        Physical Exam   Constitutional: She is oriented to person, place, and time. She appears distressed (mild).   HENT:   Head: Normocephalic and atraumatic.   Eyes: EOM are normal. Pupils are equal, round, and reactive to light.   Neck: Normal range of motion. Neck supple.   Cardiovascular: Normal rate, regular rhythm, normal heart sounds and intact distal pulses.    No murmur heard.  Pulmonary/Chest: Effort normal and breath sounds normal. No respiratory distress.   Abdominal: Soft. There is no tenderness. There is no rebound and no guarding.   Musculoskeletal: She exhibits no edema.        Right knee: She exhibits normal range of motion and no swelling. No tenderness found.        Left knee: She exhibits decreased range of motion (due to pain), swelling (mild) and LCL laxity. Tenderness (left inferior lateral knee ) found.   Pt has pain with flexion of her left knee.Pt is able to lift BLE off of stretcher   Neurological: She is alert and oriented to person, place, and time. She has normal sensation and normal strength.   Skin: Skin is warm and dry. No rash noted.   Psychiatric:  Mood and affect normal.   Nursing note and vitals reviewed.      PROCEDURES  Procedures  Splint Application:  Splint Type: knee immobilizer  Indication: tendonitis  Splint placed by RN  Post splint application:   1) neurovascularly intact   2) good position  Discussed splint care with patient  Discussed PMD/orthopedic follow up    PROGRESS AND CONSULTS  ED Course     1421- D/w pt and family that there is no indication for an XR at this time since she has not had any recent trauma or injury. Discussed the plan to place the pt in a splint prior to discharging the pt home with a prescription for steroids and instructions to follow up with Dr. Aponte (ortho). Pt understands and agrees with the plan, all questions answered.    1428- Ordered ibuprofen for pain.    MEDICAL DECISION MAKING  Results were reviewed/discussed with the patient and they were also made aware of online access. Pt also made aware that follow up with orthopedic surgery is necessary.     MDM  Number of Diagnoses or Management Options  Tendonitis of knee, left:      Amount and/or Complexity of Data Reviewed  Decide to obtain previous medical records or to obtain history from someone other than the patient: yes  Obtain history from someone other than the patient: yes (family)  Review and summarize past medical records: yes (Pt had a MRI L knee in January 2018 that showed left patellar tendinitis with chronic appearing fatty, atrophic change of the left medial gastrocnemius, soleus, and distal semimembranosus muscles, more extensive but symmetrical to muscle abnormality seen at the opposite knee, of unknown etiology. )    Patient Progress  Patient progress: stable         DIAGNOSIS  Final diagnoses:   Tendonitis of knee, left       DISPOSITION  DISCHARGE    Patient discharged in stable condition.    Reviewed implications of results, diagnosis, meds, responsibility to follow up, warning signs and symptoms of possible worsening, potential complications  and reasons to return to ER, including fever, worsening pain or any concerns.    Patient/Family voiced understanding of above instructions.    Discussed plan for discharge, as there is no emergent indication for admission. Patient referred to primary care provider for BP management due to today's BP. Pt/family is agreeable and understands need for follow up and repeat testing.  Pt is aware that discharge does not mean that nothing is wrong but it indicates no emergency is present that requires admission and they must continue care with follow-up as given below or physician of their choice.     FOLLOW-UP  Jordan Aponte MD  [address] [facility] [address]  [place] [address]  [phone]    Schedule an appointment as soon as possible for a visit      Ramiro Carbajal MD  [address] [facility] [address]  [place] [address]  [phone]      As needed         Medication List      New Prescriptions    MethylPREDNISolone 4 MG tablet  Commonly known as:  MEDROL (RENAE)  Take as directed on package instructions.              Latest Documented Vital Signs:  As of 2:32 PM  BP- (!) 157/102 HR- 105 Temp- 98.6 °F (37 °C) (Tympanic) O2 sat- 96%    --  Documentation assistance provided by tru Awad for Dr. Saleem.  Information recorded by the scribe was done at my direction and has been verified and validated by me.     Jocelyn Awad  04/22/18 8917       Jatinder Saleem MD  04/22/18 5657

## 2018-04-22 NOTE — DISCHARGE INSTRUCTIONS
Wear splint for comfort and use medications as directed . Do follow up with your orthopedist. Use over the counter Ibuprofen as needed for pain.

## 2018-04-24 ENCOUNTER — HOSPITAL ENCOUNTER (OUTPATIENT)
Dept: CARDIOLOGY | Facility: HOSPITAL | Age: 53
Discharge: HOME | End: 2018-04-24
Admitting: NURSE PRACTITIONER

## 2018-04-24 DIAGNOSIS — R00.2 PALPITATIONS: ICD-10-CM

## 2018-04-24 DIAGNOSIS — I10 ESSENTIAL HYPERTENSION: ICD-10-CM

## 2018-04-24 DIAGNOSIS — R93.1 ABNORMAL ECHOCARDIOGRAM: ICD-10-CM

## 2018-04-24 LAB
BH CV NUCLEAR PRIOR STUDY: 2
BH CV STRESS BP STAGE 1: NORMAL
BH CV STRESS BP STAGE 2: NORMAL
BH CV STRESS DURATION MIN STAGE 1: 3
BH CV STRESS DURATION MIN STAGE 2: 3
BH CV STRESS DURATION MIN STAGE 3: 1
BH CV STRESS DURATION SEC STAGE 1: 0
BH CV STRESS DURATION SEC STAGE 2: 0
BH CV STRESS DURATION SEC STAGE 3: 0
BH CV STRESS GRADE STAGE 1: 10
BH CV STRESS GRADE STAGE 2: 12
BH CV STRESS GRADE STAGE 3: 14
BH CV STRESS HR STAGE 1: 120
BH CV STRESS HR STAGE 2: 145
BH CV STRESS HR STAGE 3: 158
BH CV STRESS METS STAGE 1: 5
BH CV STRESS METS STAGE 2: 7.5
BH CV STRESS METS STAGE 3: 10
BH CV STRESS PROTOCOL 1: NORMAL
BH CV STRESS RECOVERY BP: NORMAL MMHG
BH CV STRESS RECOVERY HR: 99 BPM
BH CV STRESS SPEED STAGE 1: 1.7
BH CV STRESS SPEED STAGE 2: 2.5
BH CV STRESS SPEED STAGE 3: 3.4
BH CV STRESS STAGE 1: 1
BH CV STRESS STAGE 2: 2
BH CV STRESS STAGE 3: 3
LV EF NUC BP: 69 %
MAXIMAL PREDICTED HEART RATE: 168 BPM
PERCENT MAX PREDICTED HR: 94.05 %
STRESS BASELINE BP: NORMAL MMHG
STRESS BASELINE HR: 79 BPM
STRESS PERCENT HR: 111 %
STRESS POST ESTIMATED WORKLOAD: 7 METS
STRESS POST EXERCISE DUR MIN: 6 MIN
STRESS POST EXERCISE DUR SEC: 0 SEC
STRESS POST PEAK BP: NORMAL MMHG
STRESS POST PEAK HR: 158 BPM
STRESS TARGET HR: 143 BPM

## 2018-04-24 PROCEDURE — 0 TECHNETIUM TETROFOSMIN KIT: Performed by: NURSE PRACTITIONER

## 2018-04-24 PROCEDURE — A9502 TC99M TETROFOSMIN: HCPCS | Performed by: NURSE PRACTITIONER

## 2018-04-24 PROCEDURE — 93018 CV STRESS TEST I&R ONLY: CPT | Performed by: INTERNAL MEDICINE

## 2018-04-24 PROCEDURE — 78452 HT MUSCLE IMAGE SPECT MULT: CPT

## 2018-04-24 PROCEDURE — 93016 CV STRESS TEST SUPVJ ONLY: CPT | Performed by: INTERNAL MEDICINE

## 2018-04-24 PROCEDURE — 78452 HT MUSCLE IMAGE SPECT MULT: CPT | Performed by: INTERNAL MEDICINE

## 2018-04-24 PROCEDURE — 93017 CV STRESS TEST TRACING ONLY: CPT

## 2018-04-24 RX ADMIN — TETROFOSMIN 1 DOSE: 1.38 INJECTION, POWDER, LYOPHILIZED, FOR SOLUTION INTRAVENOUS at 14:30

## 2018-04-24 RX ADMIN — TETROFOSMIN 1 DOSE: 1.38 INJECTION, POWDER, LYOPHILIZED, FOR SOLUTION INTRAVENOUS at 13:15

## 2018-06-20 ENCOUNTER — TELEPHONE (OUTPATIENT)
Dept: NEUROLOGY | Facility: CLINIC | Age: 53
End: 2018-06-20

## 2018-06-20 NOTE — TELEPHONE ENCOUNTER
S/w pt explained to her that insurance will not approve the MRI and waiting to do peer to peer with insurance.

## 2018-06-20 NOTE — TELEPHONE ENCOUNTER
----- Message from Aline Jenkins sent at 6/19/2018 12:36 PM EDT -----  Contact: [phone] patient family farshad   pt's family called in to schedule a fu but his next available is sept 3rd I believe and they said mri was denied because pt hasn't been recently. Please advise they need to know next step if sept fu is ok or sooner? Or what to do next?

## 2018-07-30 ENCOUNTER — TELEPHONE (OUTPATIENT)
Dept: NEUROLOGY | Facility: CLINIC | Age: 53
End: 2018-07-30

## 2018-07-30 NOTE — TELEPHONE ENCOUNTER
S/w pt's son informed him that she had appt to come in for f/u to further discuss MRI. Rescheduled her to 8/17 at 11

## 2018-07-30 NOTE — TELEPHONE ENCOUNTER
----- Message from Obed Sawyer sent at 7/27/2018  1:01 PM EDT -----  Contact: [facility] (SON) [phone]  PT WAS SUPPOSE TO GET MRI SCHEDULED BACK IN April, AND NO ONE CALLED TO MAKE THE APPOINTMENT. PLEASE ADVISE AND CALL PT [phone]

## 2018-08-14 ENCOUNTER — OFFICE VISIT (OUTPATIENT)
Dept: PAIN MEDICINE | Facility: CLINIC | Age: 53
End: 2018-08-14

## 2018-08-14 VITALS
HEIGHT: 65 IN | TEMPERATURE: 97.7 F | BODY MASS INDEX: 30.09 KG/M2 | HEART RATE: 66 BPM | OXYGEN SATURATION: 98 % | RESPIRATION RATE: 15 BRPM | WEIGHT: 180.6 LBS | SYSTOLIC BLOOD PRESSURE: 131 MMHG | DIASTOLIC BLOOD PRESSURE: 85 MMHG

## 2018-08-14 DIAGNOSIS — M43.06 LUMBAR SPONDYLOLYSIS: ICD-10-CM

## 2018-08-14 DIAGNOSIS — M54.41 CHRONIC BILATERAL LOW BACK PAIN WITH BILATERAL SCIATICA: ICD-10-CM

## 2018-08-14 DIAGNOSIS — M51.36 DDD (DEGENERATIVE DISC DISEASE), LUMBAR: Primary | ICD-10-CM

## 2018-08-14 DIAGNOSIS — M54.42 CHRONIC BILATERAL LOW BACK PAIN WITH BILATERAL SCIATICA: ICD-10-CM

## 2018-08-14 DIAGNOSIS — G89.29 CHRONIC BILATERAL LOW BACK PAIN WITH BILATERAL SCIATICA: ICD-10-CM

## 2018-08-14 PROCEDURE — 99213 OFFICE O/P EST LOW 20 MIN: CPT | Performed by: PAIN MEDICINE

## 2018-08-14 RX ORDER — DICLOFENAC SODIUM 75 MG/1
75 TABLET, DELAYED RELEASE ORAL
COMMUNITY
Start: 2018-07-12 | End: 2021-03-18

## 2018-08-14 RX ORDER — HYDROXYCHLOROQUINE SULFATE 200 MG/1
200 TABLET, FILM COATED ORAL
COMMUNITY
Start: 2018-07-12 | End: 2020-02-28

## 2018-08-14 NOTE — PROGRESS NOTES
CHIEF COMPLAINT: Back Pain    HPI  Braydon Soria is a 52 y.o. female.  She is here to follow up for Back Pain  Pateint is accompanied by her son- who helps translate- and gives them permission to stay during the visit     Braydon Soria is a 52 y.o. female  who presents to the office for follow-up.  She completed a Bilateral L4-S1 Medial and Dorsal Branch Radiofrequency Lesioning  on 12-13-18. Patient reports 40-50% relief from the procedure for several months. Pain has slowly returned over last several months. BLE pain improved with past injections. Has bilateral leg heaviness that has returned. Evaluated by ED[facility]ARDO by Dr. Jackson and Dr. Redd who both stated no surgery is needed.  Since last visit their pain has improved. Pt states she is still having a little relief from injection. Her knees have been in pain so she saw an orthopedic doctor and states she has rheumatoid arthritis, given diclofenac and injections into knees- all which have helped.  The patient states their pain is a 7 on a scale of 1-10.  The patient describes this pain as constant dull and ache.  The pain is located in bilateral low back and does radiate posterior bilateral leg. This painful problem is aggravated by walking, physical activity and is alleviated by past injection, pain medication and relaxation.    Past pain medications:   Norco 5/325 mg - states she never took but filled on GEORGE  mobic - not sure if it helped     Current pain medications:   none     Past therapies:  Physical Therapy: yes - minimal beneift  Chiropractor: yes  Massage Therapy: yes  TENS: yes  Neck or back surgery: no  Past pain management: yes     Previous Injection: Bilateral L3-S1 Lumbar Medial Branch Blockade on 7/26/17  Effect of Injection (%): 10%  Length of Relief: 1 week     Previous Injection: Bilateral L3-S1 Lumbar Medial Branch Blockade on 9/13/17  Effect of Injection (%): 30%  Length of Relief: ongoing     PEG Assessment   What number best describes  your pain on average in the past week? 7  What number best describes how, during the past week, pain has interfered with your enjoyment of life? 8  What number best describes how, during the past week, pain has interfered with your general activity? 9      Current Outpatient Prescriptions:   •  amitriptyline (ELAVIL) 100 MG tablet, Take 100 mg by mouth Every Night., Disp: , Rfl:   •  amLODIPine (NORVASC) 5 MG tablet, Take 5 mg by mouth Daily., Disp: , Rfl: 1  •  citalopram (CeleXA) 40 MG tablet, Take 40 mg by mouth Daily., Disp: , Rfl:   •  cyclobenzaprine (FLEXERIL) 5 MG tablet, TAKE ONE TABLET BY MOUTH TWICE DAILY AS NEEDED FOR MUSCLE SPASM (PAIN), Disp: 60 tablet, Rfl: 1  •  diclofenac (VOLTAREN) 1 % gel gel, Apply 4 g topically 2 (Two) Times a Day. Use per pkg insert, Disp: 100 g, Rfl: 2  •  diclofenac (VOLTAREN) 75 MG EC tablet, Take 75 mg by mouth., Disp: , Rfl:   •  hydroxychloroquine (PLAQUENIL) 200 MG tablet, Take 200 mg by mouth., Disp: , Rfl:   •  LORazepam (ATIVAN) 0.5 MG tablet, Take 0.5 mg by mouth Every 8 (Eight) Hours As Needed., Disp: , Rfl:   •  MethylPREDNISolone (MEDROL, RENAE,) 4 MG tablet, Take as directed on package instructions., Disp: 1 each, Rfl: 0  •  mirtazapine (REMERON) 7.5 MG tablet, Take 7.5 mg by mouth Every Night., Disp: , Rfl:   •  prazosin (MINIPRESS) 5 MG capsule, Take 5 mg by mouth Every Night., Disp: , Rfl:   •  TOPROL XL 50 MG 24 hr tablet, Take 1.5 tablets by mouth Daily., Disp: 90 tablet, Rfl: 11    IMAGING  Lumbar xray - 11/2016:  FINDINGS: The vertebral height and disc spaces are well-maintained.  There is some very minimal anterior spurring at L1-2 and L2-3. No  subluxation occurs with flexion or extension.     Lumbar MRI - 1/2016:      Imaging last reviewed: 08/15/18     PFSH:  The following portions of the patient's history were reviewed and updated as appropriate: problem list, past medical history, past surgery history, social history, family history, medications, and  "allergies    Review of Systems   Constitutional: Positive for fatigue. Negative for chills and fever.   HENT: Negative for congestion.    Eyes: Negative for visual disturbance.   Respiratory: Positive for shortness of breath. Negative for cough and wheezing.    Cardiovascular: Positive for palpitations. Negative for chest pain and leg swelling.   Gastrointestinal: Negative for constipation and diarrhea.   Genitourinary: Positive for difficulty urinating (sometimes) and frequency.   Musculoskeletal: Positive for arthralgias (rheumatoid ) and back pain.   Neurological: Positive for weakness and numbness (bilateral hands). Negative for dizziness, light-headedness and headaches.   Psychiatric/Behavioral: Positive for sleep disturbance. Negative for agitation, confusion, hallucinations and suicidal ideas. The patient is nervous/anxious.    All other systems reviewed and are negative.      Vitals:    08/14/18 1011   BP: 131/85   Pulse: 66   Resp: 15   Temp: 97.7 °F (36.5 °C)   SpO2: 98%   Weight: 81.9 kg (180 lb 9.6 oz)   Height: 165.1 cm (65\")   PainSc:   7   PainLoc: Back       Physical Exam   Constitutional: She appears well-developed and well-nourished. No distress.   HENT:   Head: Normocephalic and atraumatic.   Nose: Nose normal.   Mouth/Throat: Oropharynx is clear and moist.   Eyes: Conjunctivae and EOM are normal.   Neck: Normal range of motion. Neck supple.   Pulmonary/Chest: Effort normal. No stridor. No respiratory distress.   Musculoskeletal:        Lumbar back: She exhibits decreased range of motion, tenderness and pain.        Right lower leg: She exhibits no tenderness, no swelling and no deformity.        Left lower leg: She exhibits no tenderness, no swelling and no deformity.        Right foot: There is normal range of motion and no tenderness.        Left foot: There is normal range of motion and no tenderness.   +bilateral lumbar facet tenderness  +bilateral lumbar facet loading    Neurological: She is " alert. She has normal strength. No cranial nerve deficit or sensory deficit.   Skin: Skin is warm and dry. No rash noted. She is not diaphoretic.   Psychiatric: She has a normal mood and affect. Her speech is normal and behavior is normal.   Nursing note and vitals reviewed.    Ortho Exam  Neurologic Exam     Mental Status   Speech: speech is normal     Cranial Nerves     CN III, IV, VI   Extraocular motions are normal.     Motor Exam     Strength   Strength 5/5 throughout.       Lab Results   Component Value Date    POCMETH Negative 07/14/2017    POCAMPHET Negative 07/14/2017    POCBARBITUR Negative 07/14/2017    POCBENZO Negative 07/14/2017    POCCOCAINE Negative 07/14/2017    POCMETHADO Negative 07/14/2017    POCOPIATES Negative 07/14/2017    POCOXYCODO Negative 07/14/2017    POCPHENCYC Negative 07/14/2017    POCPROPOXY Negative 07/14/2017    POCTHC Negative 07/14/2017    POCTRICYC Negative 07/14/2017     Last UDS results reviewed: 08/15/18   Last UDS: 7/14/17  Comments: Consistent     Date of last GEORGE reviewed : 08/15/18   Comments: Muna Bryson was seen today for back pain.    Diagnoses and all orders for this visit:    DDD (degenerative disc disease), lumbar  -     Case Request    Lumbar spondylolysis  -     Case Request    Chronic bilateral low back pain with bilateral sciatica  -     Case Request      Requested Prescriptions      No prescriptions requested or ordered in this encounter     - lumbar MRI ordered by neurologist given abnormal EMG.   - EMG showed bilateral S1 neuropathy - no benefit with LESI in past, will not repeat.     - Given good benefit from previous therapeutic lumbar RFA, would likely receive pain relief with radiofrequency ablation of the lumbar medial branch nerves. Discussed with the patient regarding the etiology of their pain. Informed them that they would likely benefit from a bilateral radiofrequency ablation of the lumbar medial branch nerves of L4, L5 and lateral  branch of S1.  The procedure was described in detail and the risks, benefits and alternatives were discussed with the patient (including but not limited to: bleeding, infection, nerve damage, worsening of pain, inability to perform injection, paralysis, seizures, and death) who agreed to proceed.  Will perform under sedation. Instructed not to eat and have a .    - CONTINUE flexeril 5 mg bid prn pain. No refills needed today.    - CONTINUE diclofenac 50 mg bid prn pain. No refills needed today.    - Side effects of NSAIDS explained as including but not limited to upset stomach, stomach ulcers, bleeding, kidney failure, fluid retention or high blood pressure.     - Doubtful that all her pain will be relieved with lumbar RFA. BLE pain was improved with injection so RFA is indicated and should help with BLE pain. She has underwent extensive work up for leg pain and it all has been negative.   - Continue home exercise program.   - no medication changes made today.     - Request me sending notes to her disability . She will give name and number to check out and we will fax over my note.    Wt Readings from Last 3 Encounters:   08/14/18 81.9 kg (180 lb 9.6 oz)   04/22/18 81.6 kg (180 lb)   04/20/18 84.4 kg (186 lb)     Body mass index is 30.05 kg/m². Patient counseled on the importance of weight loss to help with overall health and pain control. Patient instructed to attempt weight loss.   Plan: Calorie counting  reduce portion size, cut out extra servings and reduce fast food intake    Follow-up after injection    Bekah Sandy MD  Pain Management

## 2018-08-17 ENCOUNTER — OFFICE VISIT (OUTPATIENT)
Dept: NEUROLOGY | Facility: CLINIC | Age: 53
End: 2018-08-17

## 2018-08-17 VITALS
HEIGHT: 65 IN | OXYGEN SATURATION: 98 % | HEART RATE: 78 BPM | SYSTOLIC BLOOD PRESSURE: 128 MMHG | DIASTOLIC BLOOD PRESSURE: 78 MMHG | WEIGHT: 180 LBS | BODY MASS INDEX: 29.99 KG/M2

## 2018-08-17 DIAGNOSIS — M54.17 LUMBOSACRAL RADICULOPATHY AT S1: Primary | ICD-10-CM

## 2018-08-17 PROCEDURE — 99214 OFFICE O/P EST MOD 30 MIN: CPT | Performed by: PSYCHIATRY & NEUROLOGY

## 2018-08-17 NOTE — PROGRESS NOTES
CC: Paresthesias legs    HPI:  Braydon Soria is a  52 y.o.  right-handed Eastern  female who is here on follow-up.  The following is a synopsis of her history    She was seen by Dr. Aponte regarding painful symptoms in both hands knees and calves.  She also has low back pain.  She has had fairly extensive evaluation to date.  She was seen by Dr. Sage Redd and Dr. Fabián Jackson neurosurgically and both agree she did not have a surgical process in the spine.  She has had MRIs of the cervical thoracic and lumbar spine.  She has degenerative disc disease but no significant central canal stenosis or foraminal stenosis area she had an EMG done by Dr. Hernandes which was normal of the upper extremities.  She recently had MRIs of both knees showing patellar tendinitis without internal derangements otherwise however there was fatty atrophy in multiple muscles around the knee on both sides.  This change can be seen in patients who have neuropathic processes affecting those muscles by can also be seen in an idiopathic setting.  The patient had been on statin drugs that off about 2 months without any change in her symptoms.     The patient speaks only limited English.   Her son acted as her  again today.   Her main symptoms were pain in her hands when she  and pain in her knees and calves when she weight bears.  She describes burning sometimes in the feet or hands she has an area along the ulnar aspect of the distal phalanx of the left second digit in an area distal to a laceration.  She describes some paresthesias below the knees.    In the interim she was seen by Dr. Eliza Cross and was diagnosed with rheumatoid arthritis.  She had cortisone shots in the knees and is on diclofenac and Plaquenil.  Since I last saw her in the office I performed an EMG which did not show evidence of polyneuropathy but did show evidence of needle exam changes in both calf muscles, some hamstring muscles and  in the low lumbar paraspinals consistent with bilateral S1 radiculopathies.  However an MRI of the lumbar spine was rejected by insurance.  The patient has had epidural steroid injections previously by Dr. Sandy and apparently rhizotomies have been scheduled.       Past Medical History:   Diagnosis Date   • Anxiety    • Arthritis    • Chest pressure    • Depression    • Dyspnea    • Gallbladder disease     gallstones   • Hormone replacement therapy (HRT)    • Hypercholesterolemia    • Hyperlipidemia    • Hypertension    • Insomnia    • Low back pain    • Lumbosacral disc disease    • Migraines    • Peripheral neuropathy    • Rheumatoid arthritis (CMS/HCC)    • SOB (shortness of breath)    • Thyroid nodule          Past Surgical History:   Procedure Laterality Date   •  SECTION      CHILDREN WERE B[facility] IN [facility]   • CHOLECYSTECTOMY WITH INTRAOPERATIVE CHOLANGIOGRAM N/A 2017    Procedure: CHOLECYSTECTOMY LAPAROSCOPIC INTRAOPERATIVE CHOLANGIOGRAM;  Surgeon: Molina Smith MD;  Location:  [facility];  Service:    • COLONOSCOPY N/A 2016    Procedure: COLONOSCOPY TO CECUM WITH HOT SNARE POLYPECTOMY AND CLIPx1;  Surgeon: Molina Smith MD;  [facility];  Service:    • ENDOSCOPY N/A 2016    Procedure: ESOPHAGOGASTRODUODENOSCOPY;  Surgeon: Molina Smith MD;  Location: [facility] ENDOSCOPY;  Service:    • HYSTERECTOMY     • OOPHORECTOMY     • SINUS SURGERY     • TONSILLECTOMY             Current Outpatient Prescriptions:   •  amitriptyline (ELAVIL) 100 MG tablet, Take 100 mg by mouth Every Night., Disp: , Rfl:   •  amLODIPine (NORVASC) 5 MG tablet, Take 5 mg by mouth Daily., Disp: , Rfl: 1  •  citalopram (CeleXA) 40 MG tablet, Take 40 mg by mouth Daily., Disp: , Rfl:   •  cyclobenzaprine (FLEXERIL) 5 MG tablet, TAKE ONE TABLET BY MOUTH TWICE DAILY AS NEEDED FOR MUSCLE SPASM (PAIN), Disp: 60 tablet, Rfl: 1  •  diclofenac (VOLTAREN) 1 % gel gel, Apply 4 g topically 2 (Two)  Times a Day. Use per pkg insert, Disp: 100 g, Rfl: 2  •  diclofenac (VOLTAREN) 75 MG EC tablet, Take 75 mg by mouth., Disp: , Rfl:   •  hydroxychloroquine (PLAQUENIL) 200 MG tablet, Take 200 mg by mouth., Disp: , Rfl:   •  LORazepam (ATIVAN) 0.5 MG tablet, Take 0.5 mg by mouth Every 8 (Eight) Hours As Needed., Disp: , Rfl:   •  MethylPREDNISolone (MEDROL, RENAE,) 4 MG tablet, Take as directed on package instructions., Disp: 1 each, Rfl: 0  •  mirtazapine (REMERON) 7.5 MG tablet, Take 7.5 mg by mouth Every Night., Disp: , Rfl:   •  prazosin (MINIPRESS) 5 MG capsule, Take 5 mg by mouth Every Night., Disp: , Rfl:   •  TOPROL XL 50 MG 24 hr tablet, Take 1.5 tablets by mouth Daily., Disp: 90 tablet, Rfl: 11      Family History   Problem Relation Age of Onset   • Stroke Mother    • Migraines Mother    • Heart disease Father    • Heart attack Father    • Heart disease Brother    • Heart attack Brother    • Stroke Paternal Grandmother          Social History     Social History   • Marital status:      Spouse name: N/A   • Number of children: N/A   • Years of education: N/A     Occupational History   • Not on file.     Social History Main Topics   • Smoking status: Former Smoker   • Smokeless tobacco: Never Used      Comment: smokes occasionally   • Alcohol use No      Comment: daily caffiene   • Drug use: No   • Sexual activity: Defer     Other Topics Concern   • Not on file     Social History Narrative   • No narrative on file         No Known Allergies      Pain Scale: 5/10, low back, knees primarily        ROS:  Review of Systems   Constitutional: Positive for fatigue. Negative for chills and fever.   HENT: Negative for hearing loss, tinnitus and trouble swallowing.    Eyes: Negative for redness and itching.   Respiratory: Negative for cough, shortness of breath and wheezing.    Cardiovascular: Positive for palpitations and leg swelling (sometimes). Negative for chest pain.   Gastrointestinal: Negative for  "constipation, diarrhea, nausea and vomiting.   Endocrine: Negative for cold intolerance, heat intolerance and polydipsia.   Genitourinary: Negative for decreased urine volume, difficulty urinating, frequency and urgency.   Musculoskeletal: Positive for gait problem. Negative for back pain, neck pain and neck stiffness.   Skin: Negative for color change, rash and wound.   Allergic/Immunologic: Negative for environmental allergies, food allergies and immunocompromised state.   Neurological: Positive for weakness (legs). Negative for dizziness, tremors, seizures, syncope, facial asymmetry, speech difficulty, light-headedness, numbness and headaches.   Hematological: Negative for adenopathy. Does not bruise/bleed easily.   Psychiatric/Behavioral: Negative for agitation, behavioral problems, confusion, decreased concentration, dysphoric mood, hallucinations, self-injury, sleep disturbance and suicidal ideas. The patient is nervous/anxious (sometimes). The patient is not hyperactive.            Physical Exam:  Vitals:    08/17/18 1106   BP: 128/78   Pulse: 78   SpO2: 98%   Weight: 81.6 kg (180 lb)   Height: 165.1 cm (65\")     Body mass index is 29.95 kg/m².    Physical Exam  Gen.: Overweight Eastern  female no acute distress  HEENT: Normocephalic no evidence of trauma.  Neck: Supple.  Heart: Regular rate and rhythm  Straight leg raising signs were negative bilaterally    Neurological Exam:   Mental status: Awake alert and conversant.  She does not appear to have any communication problem or orientation difficulty however this is through her  son.  HCF: No clear evidence for any aphasia or apraxia.  Cranial nerves: 2-12 intact  Motor: There is mild weakness of hamstring muscles.  There is also some evidence of toe extensor weakness.  Remaining muscles tested in the legs were normal.  Muscle stretch reflexes: Her reflexes were intact except ankle jerks not present.  Sensory shows reduced pinprick in the " small toe areas both feet with other areas of the legs intact.  Cerebellar: Finger to nose rapid movements normal  Gait and Station: Casual walk toe walk heel walk intact tandem walk is a little bit off.  No Romberg no drift        Results:      Lab Results   Component Value Date    GLUCOSE 112 (H) 01/09/2018    BUN 10 01/09/2018    CREATININE 0.65 01/09/2018    EGFRIFNONA 96 01/09/2018    BCR 15.4 01/09/2018    CO2 26.4 01/09/2018    CALCIUM 9.0 01/09/2018       Lab Results   Component Value Date    WBC 9.36 01/09/2018    HGB 13.9 01/09/2018    HCT 43.0 01/09/2018    MCV 94.3 01/09/2018     01/09/2018         .No results found for: RPR      Lab Results   Component Value Date    TSH 2.230 03/12/2018         Lab Results   Component Value Date    XEYGRONT80 605 03/12/2018         Lab Results   Component Value Date    FOLATE >20.00 03/12/2018         No results found for: HGBA1C    Reviewed EMG report and data sheets        Assessment:   1.  Possible S1 radiculopathies- Physical exam evidence and EMG suggest bilateral S1 radiculopathies however she does not have radicular back pain and her straight leg raise was negative bilaterally.  2.  Rheumatoid arthritis.  3.  No evidence of polyneuropathy based on EMG          Plan:  1.  She will proceed with rhizotomies at pain management  2.  We'll again request MRI lumbar spine looking for S1 radiculopathies  3.  Follow-up 3 months        At least 15 minutes of this 25 minute follow-up were spent counseling the patient and her son regarding her diagnoses and treatment options                  Dictated utilizing Dragon dictation.

## 2018-09-21 ENCOUNTER — TELEPHONE (OUTPATIENT)
Dept: NEUROLOGY | Facility: CLINIC | Age: 53
End: 2018-09-21

## 2018-09-21 NOTE — TELEPHONE ENCOUNTER
----- Message from Tawana Pate sent at 9/19/2018  2:15 PM EDT -----  Contact: [phone]  Had you received anything from insurance about MRI and a PA

## 2018-10-16 ENCOUNTER — TELEPHONE (OUTPATIENT)
Dept: NEUROLOGY | Facility: CLINIC | Age: 53
End: 2018-10-16

## 2018-10-16 NOTE — TELEPHONE ENCOUNTER
Spoke to patients son, he received the forms from the  insurance company ([employer]) that is needed to get an approval for MRI. Patients son states that he will bring them into the office, so this matter will be taken care of.

## 2018-10-17 ENCOUNTER — TRANSCRIBE ORDERS (OUTPATIENT)
Dept: ADMINISTRATIVE | Facility: HOSPITAL | Age: 53
End: 2018-10-17

## 2018-10-17 ENCOUNTER — TELEPHONE (OUTPATIENT)
Dept: NEUROLOGY | Facility: CLINIC | Age: 53
End: 2018-10-17

## 2018-10-17 DIAGNOSIS — Z12.31 VISIT FOR SCREENING MAMMOGRAM: Primary | ICD-10-CM

## 2018-10-31 DIAGNOSIS — M54.17 LUMBOSACRAL RADICULOPATHY AT S1: Primary | ICD-10-CM

## 2018-11-10 ENCOUNTER — HOSPITAL ENCOUNTER (OUTPATIENT)
Dept: MAMMOGRAPHY | Facility: HOSPITAL | Age: 53
Discharge: HOME | End: 2018-11-10
Attending: OBSTETRICS & GYNECOLOGY | Admitting: OBSTETRICS & GYNECOLOGY

## 2018-11-10 DIAGNOSIS — Z12.31 VISIT FOR SCREENING MAMMOGRAM: ICD-10-CM

## 2018-11-10 PROCEDURE — 77063 BREAST TOMOSYNTHESIS BI: CPT

## 2018-11-10 PROCEDURE — 77067 SCR MAMMO BI INCL CAD: CPT

## 2018-11-30 ENCOUNTER — OFFICE VISIT (OUTPATIENT)
Dept: NEUROLOGY | Facility: CLINIC | Age: 53
End: 2018-11-30

## 2018-11-30 VITALS
OXYGEN SATURATION: 97 % | BODY MASS INDEX: 29.99 KG/M2 | HEART RATE: 78 BPM | SYSTOLIC BLOOD PRESSURE: 128 MMHG | HEIGHT: 65 IN | WEIGHT: 180 LBS | DIASTOLIC BLOOD PRESSURE: 78 MMHG

## 2018-11-30 DIAGNOSIS — M54.17 LUMBOSACRAL RADICULOPATHY: Primary | ICD-10-CM

## 2018-11-30 DIAGNOSIS — H93.12 TINNITUS OF LEFT EAR: ICD-10-CM

## 2018-11-30 PROBLEM — J30.2 SEASONAL ALLERGIES: Status: ACTIVE | Noted: 2018-09-25

## 2018-11-30 PROBLEM — IMO0002 DEGENERATION OF INTERVERTEBRAL DISC: Status: ACTIVE | Noted: 2018-09-25

## 2018-11-30 PROBLEM — IMO0002 BODY MASS INDEX (BMI) OF 25.0 TO 29.9: Status: ACTIVE | Noted: 2018-09-25

## 2018-11-30 PROBLEM — F43.10 POSTTRAUMATIC STRESS DISORDER: Status: ACTIVE | Noted: 2018-09-25

## 2018-11-30 PROBLEM — F41.9 ANXIETY: Status: ACTIVE | Noted: 2018-09-25

## 2018-11-30 PROBLEM — M54.9 CHRONIC BACK PAIN: Status: ACTIVE | Noted: 2018-09-25

## 2018-11-30 PROBLEM — F32.A DEPRESSIVE DISORDER: Status: ACTIVE | Noted: 2018-09-25

## 2018-11-30 PROBLEM — G89.29 CHRONIC BACK PAIN: Status: ACTIVE | Noted: 2018-09-25

## 2018-11-30 PROBLEM — E78.5 HYPERLIPIDEMIA: Status: ACTIVE | Noted: 2018-09-25

## 2018-11-30 PROBLEM — E55.9 VITAMIN D DEFICIENCY: Status: ACTIVE | Noted: 2018-09-25

## 2018-11-30 PROCEDURE — 99214 OFFICE O/P EST MOD 30 MIN: CPT | Performed by: PSYCHIATRY & NEUROLOGY

## 2018-11-30 RX ORDER — METOPROLOL SUCCINATE 50 MG/1
50 TABLET, EXTENDED RELEASE ORAL
COMMUNITY
Start: 2018-04-17 | End: 2020-03-23 | Stop reason: SDUPTHER

## 2018-11-30 RX ORDER — LIDOCAINE 50 MG/G
OINTMENT TOPICAL 3 TIMES DAILY
COMMUNITY
Start: 2018-10-16 | End: 2021-03-18

## 2018-11-30 NOTE — PROGRESS NOTES
CC:low back pain    HPI:  Braydon Soria is a  53 y.o.  right-handed white femaleho I am seeing in follow-up with low back pain and possible bilateral S1 radiculopathies.  I saw her 18  Her EMG showed needle exam changes consistent with S1 radiculopathies  She had previously been evaluated with MRI of the lumbar spine in 2016 and she had seen 2 neurosurgeon subsequently and no surgical abnormalities were identified.  The patient has passport and I ordered an MRI but it was denied.  She has seen Dr. Deborah Sandy for pain management and some ablations have been done on the low back which do seem to help.  She is taking diclofenac and using topical lidocaine which does seem to help. She describes not only pain in her low back but in her calves .she has not had additional ablations since they were also denied by passport    The patient has a new symptom it's tinnitus in the left ear.  It comes and goes.  She notices it more and she is in bed.  It has a somewhat musical quality to it.  It does not seem to be pulsatile with her heartbeat.  She denies any hearing loss or dizziness or any pain associated        Past Medical History:   Diagnosis Date   • Anxiety    • Arthritis    • Chest pressure    • Depression    • Dyspnea    • Gallbladder disease     gallstones   • Hormone replacement therapy (HRT)    • Hypercholesterolemia    • Hyperlipidemia    • Hypertension    • Insomnia    • Low back pain    • Lumbosacral disc disease    • Migraines    • Peripheral neuropathy    • Rheumatoid arthritis (CMS/HCC)    • SOB (shortness of breath)    • Thyroid nodule          Past Surgical History:   Procedure Laterality Date   •  SECTION  ,    CHILDREN WERE BORN IN [facility]   • HYSTERECTOMY     • OOPHORECTOMY     • SINUS SURGERY     • TONSILLECTOMY             Current Outpatient Medications:   •  amitriptyline (ELAVIL) 100 MG tablet, Take 100 mg by mouth Every Night., Disp: , Rfl:   •  amLODIPine (NORVASC) 5 MG  tablet, Take 5 mg by mouth Daily., Disp: , Rfl: 1  •  citalopram (CeleXA) 40 MG tablet, Take 40 mg by mouth Daily., Disp: , Rfl:   •  cyclobenzaprine (FLEXERIL) 5 MG tablet, TAKE ONE TABLET BY MOUTH TWICE DAILY AS NEEDED FOR MUSCLE SPASM (PAIN), Disp: 60 tablet, Rfl: 1  •  lidocaine (XYLOCAINE) 5 % ointment, by Intratympanic route 3 (Three) Times a Day., Disp: , Rfl:   •  metoprolol succinate XL (TOPROL XL) 50 MG 24 hr tablet, Take  by mouth., Disp: , Rfl:   •  mirtazapine (REMERON) 7.5 MG tablet, Take 7.5 mg by mouth Every Night., Disp: , Rfl:   •  diclofenac (VOLTAREN) 75 MG EC tablet, Take 75 mg by mouth., Disp: , Rfl:   •  hydroxychloroquine (PLAQUENIL) 200 MG tablet, Take 200 mg by mouth., Disp: , Rfl:   •  prazosin (MINIPRESS) 5 MG capsule, Take 5 mg by mouth Every Night., Disp: , Rfl:       Family History   Problem Relation Age of Onset   • Stroke Mother    • Migraines Mother    • Heart disease Father    • Heart attack Father    • Heart disease Brother    • Heart attack Brother    • Stroke Paternal Grandmother          Social History     Socioeconomic History   • Marital status:      Spouse name: Not on file   • Number of children: Not on file   • Years of education: Not on file   • Highest education level: Not on file   Social Needs   • Financial resource strain: Not on file   • Food insecurity - worry: Not on file   • Food insecurity - inability: Not on file   • Transportation needs - medical: Not on file   • Transportation needs - non-medical: Not on file   Occupational History   • Not on file   Tobacco Use   • Smoking status: Former Smoker   • Smokeless tobacco: Never Used   • Tobacco comment: smokes occasionally   Substance and Sexual Activity   • Alcohol use: No     Comment: daily caffiene   • Drug use: No   • Sexual activity: Defer   Other Topics Concern   • Not on file   Social History Narrative   • Not on file         No Known Allergies      Pain Scale:3/10        ROS:  Review of Systems  "  Constitutional: Negative for activity change, appetite change and fatigue.   HENT: Positive for tinnitus. Negative for ear discharge and facial swelling.    Eyes: Negative for pain, redness and itching.   Respiratory: Negative for cough, choking and shortness of breath.    Cardiovascular: Positive for leg swelling. Negative for chest pain.   Gastrointestinal: Negative for abdominal pain, nausea and vomiting.   Endocrine: Negative for cold intolerance and heat intolerance.   Musculoskeletal: Negative for arthralgias, back pain and joint swelling.   Skin: Negative for color change, pallor, rash and wound.   Allergic/Immunologic: Negative for environmental allergies and food allergies.   Neurological: Positive for weakness. Negative for dizziness, tremors, seizures, syncope, facial asymmetry, speech difficulty, light-headedness, numbness and headaches.   Hematological: Negative for adenopathy. Does not bruise/bleed easily.   Psychiatric/Behavioral: Positive for confusion and sleep disturbance. Negative for agitation, behavioral problems, decreased concentration, dysphoric mood, hallucinations, self-injury and suicidal ideas. The patient is not nervous/anxious and is not hyperactive.            Physical Exam:  Vitals:    11/30/18 1116   BP: 128/78   Pulse: 78   SpO2: 97%   Weight: 81.6 kg (180 lb)   Height: 165.1 cm (65\")     Body mass index is 29.95 kg/m².    Physical Exam  Gen.: Overweight white female no acute distress  HEENT: Normocephalic no evidence of trauma.  Ears: TMs are intact with out any retro-tympanic lesion noted on either side.  Throat negative.  Neck: Supple.  No cervical bruits.  Heart: Regular rate and rhythm without murmur.  No pedal edema  Straight leg raising was accomplished to greater than 90° with out radicular pain on either side.  There is tenderness over the low lumbar spine but not really the paraspinals or over the sacroiliac joints or sciatic notches.    Neurological Exam:   Mental " status: Awake alert oriented and conversant without evidence of an affective disorder thought disorder delusions or hallucinations.  HCF: No aphasia or apraxia or dysarthria.  She speaks primarily in Eastern  language.  Her son translates.  Cranial nerves: 2-12 intact  Motor: Normal tone and bulk with full power in all muscles tested in the arms and legs.  Reflexes: Diffusely +2 with absent ankle jerks and downgoing toe signs.  Sensory: Normal light touch and pinprick throughout the arms and legs.  Cerebellar: Finger to nose shows minimal postural tremor.  Heel-to-shin normal rapid movements normal  Gait and Station: Casual toe heel and tandem walk normal no Romberg no drift      Results:      Lab Results   Component Value Date    GLUCOSE 112 (H) 01/09/2018    BUN 10 01/09/2018    CREATININE 0.65 01/09/2018    EGFRIFNONA 96 01/09/2018    BCR 15.4 01/09/2018    CO2 26.4 01/09/2018    CALCIUM 9.0 01/09/2018       Lab Results   Component Value Date    WBC 9.36 01/09/2018    HGB 13.9 01/09/2018    HCT 43.0 01/09/2018    MCV 94.3 01/09/2018     01/09/2018         .No results found for: RPR      Lab Results   Component Value Date    TSH 2.230 03/12/2018         Lab Results   Component Value Date    [id number] 605 03/12/2018         Lab Results   Component Value Date    FOLATE >20.00 03/12/2018         No results found for: HGBA1C    Previous MRI report reviewed showing mild disc bulge at L5/S1.  Study was performed 2016 at an outside facility        Assessment:   1.  Low back pain with some radicular pain-EMG showed needle exam changes consistent with S1 radiculopathies but clinical exam does not show clearly findings to corroborate this.  2.  Tinnitus left ear, intermittently          Plan:  1.  Will again try to obtain the MRI of lumbar spine.  It's been almost 3 years since the last study.  2.  I doubt further investigation is needed for her left ear tinnitus at the present time.  Should she develop  progressive hearing loss, vertigo or some other focal neurologic problem then reevaluation would be recommended.  3.  Follow-up after MRI with a nurse practitioner.  If MRI is again denied then follow-up if symptoms are worsening          At least 50% of this 25 minute follow-up was spent counseling the patient and her son regarding evaluations of her back pain and tinnitus.  Currently symptomatically her back pain is somewhat better and she is able to deal with it a bit better than previously.  Also awaiting when she can have additional ablations                Dictated utilizing Dragon dictation.

## 2018-12-14 ENCOUNTER — HOSPITAL ENCOUNTER (OUTPATIENT)
Dept: MRI IMAGING | Facility: HOSPITAL | Age: 53
Discharge: HOME | End: 2018-12-14
Attending: PSYCHIATRY & NEUROLOGY | Admitting: PSYCHIATRY & NEUROLOGY

## 2018-12-14 DIAGNOSIS — M54.17 LUMBOSACRAL RADICULOPATHY: ICD-10-CM

## 2018-12-14 PROCEDURE — 72148 MRI LUMBAR SPINE W/O DYE: CPT

## 2019-01-11 ENCOUNTER — OFFICE VISIT (OUTPATIENT)
Dept: NEUROLOGY | Facility: CLINIC | Age: 54
End: 2019-01-11

## 2019-01-11 VITALS
WEIGHT: 179 LBS | DIASTOLIC BLOOD PRESSURE: 92 MMHG | OXYGEN SATURATION: 98 % | BODY MASS INDEX: 29.82 KG/M2 | HEART RATE: 79 BPM | HEIGHT: 65 IN | SYSTOLIC BLOOD PRESSURE: 144 MMHG

## 2019-01-11 DIAGNOSIS — M54.41 CHRONIC BILATERAL LOW BACK PAIN WITH BILATERAL SCIATICA: Primary | ICD-10-CM

## 2019-01-11 DIAGNOSIS — G89.29 CHRONIC BILATERAL LOW BACK PAIN WITH BILATERAL SCIATICA: Primary | ICD-10-CM

## 2019-01-11 DIAGNOSIS — M54.42 CHRONIC BILATERAL LOW BACK PAIN WITH BILATERAL SCIATICA: Primary | ICD-10-CM

## 2019-01-11 PROCEDURE — 99214 OFFICE O/P EST MOD 30 MIN: CPT | Performed by: PSYCHIATRY & NEUROLOGY

## 2019-01-11 NOTE — PROGRESS NOTES
CC: Low back pain, lumbosacral radiculopathy    HPI:  Braydon Soria is a  53 y.o.  right-handed Eastern  female who I saw last 18 with low back pain and question of S1 radiculopathies.  She had had a previous MRI of the lumbar spine in 2016 which showed some degenerative disc disease without significant root impingements.  Her 2018 EMG however showed some needle exam changes in the gastrocnemius muscles and so repeat MRI was requested and eventually okayed.  The patient is again accompanied by her son who acts as an  since she speaks only broken English.    MRI of the lumbar spine films reviewed shows degenerative disc disease in the lumbar spine most prominent at the L4/5 level.  There is at worst a mild foraminal stenosis and no significant central canal stenosis.  I followed the S1 root throughout its course and see no compression of at anywhere in the lumbar spine.    The patient currently states that her back pain is not severe and it is more intermittent.  It's in the posterior hip regions to.  She is bothered by bilateral knee pain due to her rheumatoid arthritis.  She has a little trouble in other joints such as some of them in her hands but the knees seem to be the worst.  She sees Dr. Eliza Cross for this and she takes Plaquenil, diclofenac and lidocaine ointment.        Past Medical History:   Diagnosis Date   • Anxiety    • Arthritis    • Chest pressure    • Depression    • Dyspnea    • Gallbladder disease     gallstones   • Hormone replacement therapy (HRT)    • Hypercholesterolemia    • Hyperlipidemia    • Hypertension    • Insomnia    • Low back pain    • Lumbosacral disc disease    • Migraines    • Peripheral neuropathy    • Rheumatoid arthritis (CMS/HCC)    • SOB (shortness of breath)    • Thyroid nodule          Past Surgical History:   Procedure Laterality Date   •  SECTION  ,    CHILDREN WERE BORN IN [facility]   • CHOLECYSTECTOMY WITH INTRAOPERATIVE  CHOLANGIOGRAM N/A 1/13/2017    Procedure: CHOLECYSTECTOMY LAPAROSCOPIC INTRAOPERATIVE CHOLANGIOGRAM;  Surgeon: Molina Smith MD;  Location:  [facility];  Service:    • COLONOSCOPY N/A 12/21/2016    Procedure: COLONOSCOPY TO CECUM WITH HOT SNARE POLYPECTOMY AND CLIPx1;  Surgeon: Molina Smith MD;  [facility];  Service:    • ENDOSCOPY N/A 12/21/2016    Procedure: ESOPHAGOGASTRODUODENOSCOPY;  Surgeon: Molina Smith MD;  [facility];  Service:    • HYSTERECTOMY     • OOPHORECTOMY     • SINUS SURGERY     • TONSILLECTOMY             Current Outpatient Medications:   •  amitriptyline (ELAVIL) 100 MG tablet, Take 100 mg by mouth Every Night., Disp: , Rfl:   •  amLODIPine (NORVASC) 5 MG tablet, Take 5 mg by mouth Daily., Disp: , Rfl: 1  •  citalopram (CeleXA) 40 MG tablet, Take 40 mg by mouth Daily., Disp: , Rfl:   •  cyclobenzaprine (FLEXERIL) 5 MG tablet, TAKE ONE TABLET BY MOUTH TWICE DAILY AS NEEDED FOR MUSCLE SPASM (PAIN), Disp: 60 tablet, Rfl: 1  •  diclofenac (VOLTAREN) 75 MG EC tablet, Take 75 mg by mouth., Disp: , Rfl:   •  hydroxychloroquine (PLAQUENIL) 200 MG tablet, Take 200 mg by mouth., Disp: , Rfl:   •  lidocaine (XYLOCAINE) 5 % ointment, by Intratympanic route 3 (Three) Times a Day., Disp: , Rfl:   •  metoprolol succinate XL (TOPROL XL) 50 MG 24 hr tablet, Take  by mouth., Disp: , Rfl:   •  mirtazapine (REMERON) 7.5 MG tablet, Take 7.5 mg by mouth Every Night., Disp: , Rfl:   •  prazosin (MINIPRESS) 5 MG capsule, Take 5 mg by mouth Every Night., Disp: , Rfl:       Family History   Problem Relation Age of Onset   • Stroke Mother    • Migraines Mother    • Heart disease Father    • Heart attack Father    • Heart disease Brother    • Heart attack Brother    • Stroke Paternal Grandmother          Social History     Socioeconomic History   • Marital status:      Spouse name: Not on file   • Number of children: Not on file   • Years of education: Not on file   •  "Highest education level: Not on file   Social Needs   • Financial resource strain: Not on file   • Food insecurity - worry: Not on file   • Food insecurity - inability: Not on file   • Transportation needs - medical: Not on file   • Transportation needs - non-medical: Not on file   Occupational History   • Not on file   Tobacco Use   • Smoking status: Former Smoker   • Smokeless tobacco: Never Used   • Tobacco comment: smokes occasionally   Substance and Sexual Activity   • Alcohol use: No     Comment: daily caffiene   • Drug use: No   • Sexual activity: Defer   Other Topics Concern   • Not on file   Social History Narrative   • Not on file         No Known Allergies      Pain Scale: 3 or 4/10 mostly knees        ROS:  Review of Systems   Constitutional: Negative for activity change, appetite change and fatigue.   HENT: Negative for ear pain, facial swelling and hearing loss.    Eyes: Negative for pain, redness and itching.   Respiratory: Negative for cough, choking and shortness of breath.    Cardiovascular: Positive for leg swelling. Negative for chest pain.   Gastrointestinal: Negative for abdominal pain, nausea and vomiting.   Endocrine: Negative for cold intolerance and heat intolerance.   Skin: Negative for color change, pallor, rash and wound.   Allergic/Immunologic: Negative for environmental allergies and food allergies.   Neurological: Positive for weakness. Negative for dizziness, tremors, seizures, syncope, facial asymmetry, speech difficulty, light-headedness, numbness and headaches.   Psychiatric/Behavioral: Negative for agitation, behavioral problems, confusion, decreased concentration, dysphoric mood, hallucinations, self-injury, sleep disturbance and suicidal ideas. The patient is not nervous/anxious and is not hyperactive.            Physical Exam:  Vitals:    01/11/19 1028   BP: 144/92   Pulse: 79   SpO2: 98%   Weight: 81.2 kg (179 lb)   Height: 165.1 cm (65\")     Body mass index is 29.79 " kg/m².    Physical Exam  Gen.: Overweight white female no acute distress  HEENT: Normocephalic no evidence of trauma.  Neck: Supple.  Heart: Regular rate and rhythm    Straight leg raising signs are negative bilaterally to past 90° (patient is very flexible)  Neurological Exam:   Mental status: Awake alert oriented conversant provides good history through the .  No evidence of an affective disorder thought disorder delusions or hallucinations.  HCF: No aphasia or apraxia or dysarthria again to the .  Cranial nerves: 2-12 intact  Motor: Normal tone and bulk with full power in all muscles tested in the arms and legs.  Reflexes: Diffusely in the arms +1, knees +2, ankle jerks not present, toe signs downgoing bilaterally.  Sensory: Normal light touch and pinprick throughout the lower extremities.  Normal vibration and position senses in the feet.  Cerebellar: Finger to nose rapid movement heel-to-shin normal  Gait and Station: Casual toe heel and tandem walk normal other than some pain in the calves on toe walking.  No Romberg no drift        Results:      Lab Results   Component Value Date    GLUCOSE 112 (H) 01/09/2018    BUN 10 01/09/2018    CREATININE 0.65 01/09/2018    EGFRIFNONA 96 01/09/2018    BCR 15.4 01/09/2018    CO2 26.4 01/09/2018    CALCIUM 9.0 01/09/2018       Lab Results   Component Value Date    WBC 9.36 01/09/2018    HGB 13.9 01/09/2018    HCT 43.0 01/09/2018    MCV 94.3 01/09/2018     01/09/2018         .No results found for: RPR      Lab Results   Component Value Date    TSH 2.230 03/12/2018         Lab Results   Component Value Date    [id number] 605 03/12/2018         Lab Results   Component Value Date    FOLATE >20.00 03/12/2018         No results found for: HGBA1C            Assessment:   1.  Low back and posterior hip pain bilaterally-suspect mostly arthritic.  There were some needle exam changes in her gastrocnemius muscles but she has no clinical weakness in her  exam shows negative straight leg raising signs.  MRI is not suspicious for entrapment of the S1 roots  2.  Rheumatoid arthritis, followed by rheumatology        Plan:  1.  Doubt further neurologic investigation is needed at this time.  2.  Return when necessary                          Dictated utilizing Dragon dictation.

## 2019-09-26 ENCOUNTER — TRANSCRIBE ORDERS (OUTPATIENT)
Dept: ADMINISTRATIVE | Facility: HOSPITAL | Age: 54
End: 2019-09-26

## 2019-09-26 DIAGNOSIS — Z12.31 VISIT FOR SCREENING MAMMOGRAM: Primary | ICD-10-CM

## 2019-11-16 ENCOUNTER — HOSPITAL ENCOUNTER (OUTPATIENT)
Dept: MAMMOGRAPHY | Facility: HOSPITAL | Age: 54
Discharge: HOME | End: 2019-11-16
Admitting: OBSTETRICS & GYNECOLOGY

## 2019-11-16 DIAGNOSIS — Z12.31 VISIT FOR SCREENING MAMMOGRAM: ICD-10-CM

## 2019-11-16 PROCEDURE — 77067 SCR MAMMO BI INCL CAD: CPT

## 2019-11-16 PROCEDURE — 77063 BREAST TOMOSYNTHESIS BI: CPT

## 2020-02-21 ENCOUNTER — OFFICE VISIT (OUTPATIENT)
Dept: FAMILY MEDICINE CLINIC | Facility: CLINIC | Age: 55
End: 2020-02-21

## 2020-02-21 VITALS
HEIGHT: 65 IN | WEIGHT: 178 LBS | OXYGEN SATURATION: 99 % | BODY MASS INDEX: 29.66 KG/M2 | HEART RATE: 80 BPM | RESPIRATION RATE: 16 BRPM | DIASTOLIC BLOOD PRESSURE: 70 MMHG | TEMPERATURE: 98 F | SYSTOLIC BLOOD PRESSURE: 110 MMHG

## 2020-02-21 DIAGNOSIS — I10 ESSENTIAL HYPERTENSION: ICD-10-CM

## 2020-02-21 DIAGNOSIS — E04.9 THYROID ENLARGEMENT: ICD-10-CM

## 2020-02-21 DIAGNOSIS — E55.9 VITAMIN D DEFICIENCY, UNSPECIFIED: ICD-10-CM

## 2020-02-21 DIAGNOSIS — Z00.00 MEDICARE ANNUAL WELLNESS VISIT, SUBSEQUENT: Primary | ICD-10-CM

## 2020-02-21 DIAGNOSIS — E78.00 PURE HYPERCHOLESTEROLEMIA: ICD-10-CM

## 2020-02-21 PROBLEM — M05.20 RHEUMATOID ARTERITIS: Status: ACTIVE | Noted: 2020-02-21

## 2020-02-21 LAB
25(OH)D3 SERPL-MCNC: 46 NG/ML (ref 30–100)
ALBUMIN SERPL-MCNC: 4.5 G/DL (ref 3.5–5.2)
ALBUMIN/GLOB SERPL: 1.6 G/DL
ALP SERPL-CCNC: 67 U/L (ref 39–117)
ALT SERPL W P-5'-P-CCNC: 10 U/L (ref 1–33)
ANION GAP SERPL CALCULATED.3IONS-SCNC: 11.9 MMOL/L (ref 5–15)
AST SERPL-CCNC: 17 U/L (ref 1–32)
BILIRUB SERPL-MCNC: 0.4 MG/DL (ref 0.2–1.2)
BUN BLD-MCNC: 10 MG/DL (ref 6–20)
BUN/CREAT SERPL: 13.3 (ref 7–25)
CALCIUM SPEC-SCNC: 9.6 MG/DL (ref 8.6–10.5)
CHLORIDE SERPL-SCNC: 101 MMOL/L (ref 98–107)
CHOLEST SERPL-MCNC: 263 MG/DL (ref 0–200)
CO2 SERPL-SCNC: 27.1 MMOL/L (ref 22–29)
CREAT BLD-MCNC: 0.75 MG/DL (ref 0.57–1)
DEPRECATED RDW RBC AUTO: 43.1 FL (ref 37–54)
ERYTHROCYTE [DISTWIDTH] IN BLOOD BY AUTOMATED COUNT: 13 % (ref 12.3–15.4)
GFR SERPL CREATININE-BSD FRML MDRD: 81 ML/MIN/1.73
GLOBULIN UR ELPH-MCNC: 2.9 GM/DL
GLUCOSE BLD-MCNC: 102 MG/DL (ref 65–99)
HCT VFR BLD AUTO: 42.4 % (ref 34–46.6)
HDLC SERPL-MCNC: 52 MG/DL (ref 40–60)
HGB BLD-MCNC: 14.1 G/DL (ref 12–15.9)
LDLC SERPL CALC-MCNC: 183 MG/DL (ref 0–100)
LDLC/HDLC SERPL: 3.52 {RATIO}
MCH RBC QN AUTO: 30 PG (ref 26.6–33)
MCHC RBC AUTO-ENTMCNC: 33.3 G/DL (ref 31.5–35.7)
MCV RBC AUTO: 90.2 FL (ref 79–97)
PLATELET # BLD AUTO: 292 10*3/MM3 (ref 140–450)
PMV BLD AUTO: 10 FL (ref 6–12)
POTASSIUM BLD-SCNC: 4.3 MMOL/L (ref 3.5–5.2)
PROT SERPL-MCNC: 7.4 G/DL (ref 6–8.5)
RBC # BLD AUTO: 4.7 10*6/MM3 (ref 3.77–5.28)
SODIUM BLD-SCNC: 140 MMOL/L (ref 136–145)
T-UPTAKE NFR SERPL: 1.1 TBI (ref 0.8–1.3)
T4 SERPL-MCNC: 8.88 MCG/DL (ref 4.5–11.7)
TRIGL SERPL-MCNC: 139 MG/DL (ref 0–150)
TSH SERPL DL<=0.05 MIU/L-ACNC: 4.18 UIU/ML (ref 0.27–4.2)
VLDLC SERPL-MCNC: 27.8 MG/DL (ref 5–40)
WBC NRBC COR # BLD: 8.34 10*3/MM3 (ref 3.4–10.8)

## 2020-02-21 PROCEDURE — 99214 OFFICE O/P EST MOD 30 MIN: CPT | Performed by: INTERNAL MEDICINE

## 2020-02-21 PROCEDURE — 84436 ASSAY OF TOTAL THYROXINE: CPT | Performed by: INTERNAL MEDICINE

## 2020-02-21 PROCEDURE — G0439 PPPS, SUBSEQ VISIT: HCPCS | Performed by: INTERNAL MEDICINE

## 2020-02-21 PROCEDURE — 82306 VITAMIN D 25 HYDROXY: CPT | Performed by: INTERNAL MEDICINE

## 2020-02-21 PROCEDURE — 80053 COMPREHEN METABOLIC PANEL: CPT | Performed by: INTERNAL MEDICINE

## 2020-02-21 PROCEDURE — 36415 COLL VENOUS BLD VENIPUNCTURE: CPT | Performed by: INTERNAL MEDICINE

## 2020-02-21 PROCEDURE — 84479 ASSAY OF THYROID (T3 OR T4): CPT | Performed by: INTERNAL MEDICINE

## 2020-02-21 PROCEDURE — 80061 LIPID PANEL: CPT | Performed by: INTERNAL MEDICINE

## 2020-02-21 PROCEDURE — 85027 COMPLETE CBC AUTOMATED: CPT | Performed by: INTERNAL MEDICINE

## 2020-02-21 PROCEDURE — 84443 ASSAY THYROID STIM HORMONE: CPT | Performed by: INTERNAL MEDICINE

## 2020-02-21 NOTE — PROGRESS NOTES
Initial Medicare Wellness Visit   The ABC's of the Annual Wellness Visit    Chief Complaint   Patient presents with   • New Patient     Establish Care       HPI:  Braydon Soria, -1965, is a 54 y.o. female who presents for an Initial Medicare Wellness Visit.  Patient was seen for a Medicare wellness exam.  Patient was seen for hypertension.  Blood pressures been running 100 2110 over 80s at home.  Patient's lipids treated with diet exercise.  Patient does have a enlarged thyroid is been followed on a regular basis.  TSH is 2.2 with a T4 level 1.2.  Patient does take vitamin D3 over-the-counter.      Recent Hospitalizations:  No hospitalization(s) within the last year..  Patient was seen for  Current Medical Providers:  Patient Care Team:  Pawan Arambula MD as PCP - General (Internal Medicine)  Ramiro Carbajal MD as PCP - Family Medicine  Molina Smith MD as Surgeon (General Surgery)  Remington Barboza MD as Consulting Physician (Neurology)  Jordan Aponte MD as Surgeon (Orthopedic Surgery)    Health Habits and Functional and Cognitive Screening and Depression Screening:  Functional & Cognitive Status 2020   Do you have difficulty preparing food and eating? No   Do you have difficulty bathing yourself, getting dressed or grooming yourself? No   Do you have difficulty using the toilet? No   Do you have difficulty moving around from place to place? No   Do you have trouble with steps or getting out of a bed or a chair? No   Current Diet Well Balanced Diet   Dental Exam Up to date   Eye Exam Up to date   Exercise (times per week) 0 times per week   Current Exercise Activities Include Aerobics   Do you need help using the phone?  No   Are you deaf or do you have serious difficulty hearing?  No   Do you need help with transportation? No   Do you need help shopping? No   Do you need help preparing meals?  No   Do you need help with housework?  No   Do you need help with laundry? No   Do you need  help taking your medications? No   Do you need help managing money? No   Do you ever drive or ride in a car without wearing a seat belt? No   Have you felt unusual stress, anger or loneliness in the last month? No   Who do you live with? Spouse   If you need help, do you have trouble finding someone available to you? No   Have you been bothered in the last four weeks by sexual problems? No   Do you have difficulty concentrating, remembering or making decisions? No       Compared to one year ago, the patient feels her physical health is the same and her mental health is the same.    Depression Screen:  PHQ-2/PHQ-9 Depression Screening 2/21/2020   Little interest or pleasure in doing things 1   Feeling down, depressed, or hopeless 1   Trouble falling or staying asleep, or sleeping too much 0   Feeling tired or having little energy 0   Poor appetite or overeating 0   Feeling bad about yourself - or that you are a failure or have let yourself or your family down 1   Trouble concentrating on things, such as reading the newspaper or watching television 0   Moving or speaking so slowly that other people could have noticed. Or the opposite - being so fidgety or restless that you have been moving around a lot more than usual 0   Thoughts that you would be better off dead, or of hurting yourself in some way 0   Total Score 3   If you checked off any problems, how difficult have these problems made it for you to do your work, take care of things at home, or get along with other people? Somewhat difficult         Past Medical/Family/Social History:  The following portions of the patient's history were reviewed and updated as appropriate: allergies, current medications, past family history, past medical history, past social history, past surgical history and problem list.    No Known Allergies      Current Outpatient Medications:   •  amLODIPine (NORVASC) 5 MG tablet, Take 5 mg by mouth Daily., Disp: , Rfl: 1  •  metoprolol  succinate XL (TOPROL XL) 50 MG 24 hr tablet, Take 50 mg by mouth., Disp: , Rfl:   •  amitriptyline (ELAVIL) 100 MG tablet, Take 100 mg by mouth Every Night., Disp: , Rfl:   •  citalopram (CeleXA) 40 MG tablet, Take 40 mg by mouth Daily., Disp: , Rfl:   •  cyclobenzaprine (FLEXERIL) 5 MG tablet, TAKE ONE TABLET BY MOUTH TWICE DAILY AS NEEDED FOR MUSCLE SPASM (PAIN), Disp: 60 tablet, Rfl: 1  •  diclofenac (VOLTAREN) 75 MG EC tablet, Take 75 mg by mouth., Disp: , Rfl:   •  hydroxychloroquine (PLAQUENIL) 200 MG tablet, Take 200 mg by mouth., Disp: , Rfl:   •  lidocaine (XYLOCAINE) 5 % ointment, by Intratympanic route 3 (Three) Times a Day., Disp: , Rfl:   •  mirtazapine (REMERON) 7.5 MG tablet, Take 7.5 mg by mouth Every Night., Disp: , Rfl:   •  prazosin (MINIPRESS) 5 MG capsule, Take 5 mg by mouth Every Night., Disp: , Rfl:     Aspirin use counseling: Does not need ASA (and currently is not on it)    Current medication list contains no high risk medications.  No harmful drug interactions have been identified.     Family History   Problem Relation Age of Onset   • Stroke Mother    • Migraines Mother    • Heart disease Father    • Heart attack Father    • Heart disease Brother    • Heart attack Brother    • Stroke Paternal Grandmother        Social History     Tobacco Use   • Smoking status: Former Smoker   • Smokeless tobacco: Never Used   • Tobacco comment: smokes occasionally   Substance Use Topics   • Alcohol use: No     Comment: daily caffiene       Past Surgical History:   Procedure Laterality Date   •  SECTION  ,    CHILDREN WERE BORN IN [place]   • CHOLECYSTECTOMY WITH INTRAOPERATIVE CHOLANGIOGRAM N/A 2017    Procedure: CHOLECYSTECTOMY LAPAROSCOPIC INTRAOPERATIVE CHOLANGIOGRAM;  Surgeon: Molina Smith MD;  [place] [facility] [place] [facility] [place]:    • COLONOSCOPY N/A 2016    Procedure: COLONOSCOPY TO CECUM WITH HOT SNARE POLYPECTOMY AND CLIPx1;  Surgeon: Molina Smith MD;  Location:   [facility];  Service:    • ENDOSCOPY N/A 12/21/2016    Procedure: ESOPHAGOGASTRODUODENOSCOPY;  Surgeon: Molina Smith MD;  Location:  [facility];  Service:    • HYSTERECTOMY     • OOPHORECTOMY     • SINUS SURGERY     • TONSILLECTOMY         Patient Active Problem List   Diagnosis   • Midline low back pain with left-sided sciatica   • Hyperreflexia   • Incomplete bladder emptying   • Thyroid enlargement   • Essential hypertension   • Chronic bilateral low back pain with bilateral sciatica   • DDD (degenerative disc disease), lumbar   • Lumbar spondylolysis   • Atrophy of muscle of lower leg   • Arthritis of both hands   • Patellar tendonitis of both knees   • Chondromalacia of patella   • Palpitations   • Abnormal echocardiogram   • Degeneration of intervertebral disc   • Vitamin D deficiency   • Seasonal allergies   • Posttraumatic stress disorder   • Hyperlipidemia   • Depressive disorder   • Chronic back pain   • Body mass index (BMI) of 25.0 to 29.9   • Anxiety   • 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   HENT: Positive for congestion. Negative for trouble swallowing.    Eyes: Negative for discharge and visual disturbance.   Respiratory: Negative for choking and shortness of breath.    Cardiovascular: Negative for chest pain and palpitations.   Gastrointestinal: Negative for abdominal pain and blood in stool.   Endocrine: Negative.    Genitourinary: Negative for genital sores and hematuria.   Musculoskeletal: Negative for gait problem and joint swelling.   Skin: Negative for color change, pallor, rash and wound.   Allergic/Immunologic: Positive for environmental allergies. Negative for immunocompromised state.   Neurological: Negative for facial asymmetry and speech difficulty.   Psychiatric/Behavioral: Negative for hallucinations and suicidal ideas.       Objective     Vitals:    02/21/20 0945   BP: 110/70   BP Location: Left arm  "  Patient Position: Sitting   Cuff Size: Adult   Pulse: 80   Resp: 16   Temp: 98 °F (36.7 °C)   TempSrc: Oral   SpO2: 99%   Weight: 80.7 kg (178 lb)   Height: 165.1 cm (65\")   PainSc: 0-No pain       Patient's Body mass index is 29.62 kg/m². BMI is within normal parameters. No follow-up required..      No exam data present    The patient has no evidence of cognitve impairment.     Physical Exam   Constitutional: She is oriented to person, place, and time. She appears well-developed and well-nourished.   HENT:   Head: Normocephalic and atraumatic.   Right Ear: External ear normal.   Left Ear: External ear normal.   Nose: Nose normal.   Mouth/Throat: Oropharynx is clear and moist.   Eyes: Pupils are equal, round, and reactive to light. Conjunctivae and EOM are normal.   Neck: Normal range of motion. Neck supple.   Cardiovascular: Normal rate, regular rhythm, normal heart sounds and intact distal pulses.   Pulmonary/Chest: Effort normal and breath sounds normal.   Abdominal: Soft. Bowel sounds are normal.   Musculoskeletal: Normal range of motion.   Neurological: She is alert and oriented to person, place, and time.   Skin: Skin is warm and dry.   Psychiatric: She has a normal mood and affect. Her behavior is normal. Judgment and thought content normal.       Recent Lab Results:  Lab Results   Component Value Date    GLU 86 08/22/2019            Assessment/Plan   Age-appropriate Screening Schedule:  Refer to the list below for future screening recommendations based on patient's age, sex and/or medical conditions.      Health Maintenance   Topic Date Due   • TDAP/TD VACCINES (1 - Tdap) 11/15/1976   • ZOSTER VACCINE (1 of 2) 11/15/2015   • PAP SMEAR  03/07/2017   • LIPID PANEL  04/03/2017   • MAMMOGRAM  11/16/2021   • COLONOSCOPY  02/21/2030   • INFLUENZA VACCINE  Addressed       Medicare Risks and Personalized Health Plan:  NA      CMS-Preventive Services Quick Reference  Medicare Preventive Services " Addressed:  NA    Advance Care Planning: #1 continue to monitor blood pressure at home #2 continue present diet and activity levels #3 labs  ACP discussion was held with the patient during this visit.    Diagnoses and all orders for this visit:    1. Medicare annual wellness visit, subsequent (Primary)    2. Essential hypertension  -     CBC (No Diff)  -     Comprehensive Metabolic Panel  -     Lipid Panel  -     Vitamin D 25 Hydroxy  -     Cancel: Thyroid Panel With TSH    3. Pure hypercholesterolemia  -     CBC (No Diff)  -     Comprehensive Metabolic Panel  -     Lipid Panel  -     Vitamin D 25 Hydroxy  -     Cancel: Thyroid Panel With TSH    4. Thyroid enlargement  -     Thyroid Panel With TSH  -     CBC (No Diff)  -     Comprehensive Metabolic Panel  -     Lipid Panel  -     Vitamin D 25 Hydroxy  -     Cancel: Thyroid Panel With TSH    5. Vitamin D deficiency, unspecified   -     Vitamin D 25 Hydroxy        An After Visit Summary and PPPS with all of these plans were given to the patient.      Follow Up:  Return in about 4 weeks (around 3/20/2020).

## 2020-02-21 NOTE — PATIENT INSTRUCTIONS
Medicare Wellness  Personal Prevention Plan of Service     Date of Office Visit:  2020  Encounter Provider:  Pawan Arambula MD  Place of Service:  [facility] AND [facility]  Patient Name: Braydon Soria  :  1965    As part of the Medicare Wellness portion of your visit today, we are providing you with this personalized preventive plan of services (PPPS). This plan is based upon recommendations of the [place] Preventive Services Task Force (USPSTF) and the Advisory Committee on Immunization Practices (ACIP).    This lists the preventive care services that should be considered, and provides dates of when you are due. Items listed as completed are up-to-date and do not require any further intervention.    Health Maintenance   Topic Date Due   • TDAP/TD VACCINES (1 - Tdap) 11/15/1976   • ZOSTER VACCINE (1 of 2) 11/15/2015   • PAP SMEAR  2017   • LIPID PANEL  2017   • MEDICARE ANNUAL WELLNESS  2021   • MAMMOGRAM  2021   • COLONOSCOPY  2030   • HEPATITIS C SCREENING  Completed   • INFLUENZA VACCINE  Addressed       No orders of the defined types were placed in this encounter.      No follow-ups on file.

## 2020-02-24 ENCOUNTER — TELEPHONE (OUTPATIENT)
Dept: FAMILY MEDICINE CLINIC | Facility: CLINIC | Age: 55
End: 2020-02-24

## 2020-02-24 NOTE — TELEPHONE ENCOUNTER
HUB PLEASE INFORM PT OF BELOW RESULTS    ----- Message from Pawan Arambula MD sent at 2/21/2020  5:03 PM EST -----  LDLs 184 recommend statin low-cholesterol diet and low impact exercise 30 minutes 3-5 times a week

## 2020-02-28 ENCOUNTER — OFFICE VISIT (OUTPATIENT)
Dept: FAMILY MEDICINE CLINIC | Facility: CLINIC | Age: 55
End: 2020-02-28

## 2020-02-28 VITALS
HEART RATE: 73 BPM | WEIGHT: 174 LBS | BODY MASS INDEX: 28.99 KG/M2 | RESPIRATION RATE: 20 BRPM | SYSTOLIC BLOOD PRESSURE: 130 MMHG | DIASTOLIC BLOOD PRESSURE: 70 MMHG | OXYGEN SATURATION: 95 % | HEIGHT: 65 IN | TEMPERATURE: 98.4 F

## 2020-02-28 DIAGNOSIS — J06.9 ACUTE URI: ICD-10-CM

## 2020-02-28 DIAGNOSIS — R68.89 FLU-LIKE SYMPTOMS: Primary | ICD-10-CM

## 2020-02-28 DIAGNOSIS — H65.01 RIGHT ACUTE SEROUS OTITIS MEDIA, RECURRENCE NOT SPECIFIED: ICD-10-CM

## 2020-02-28 LAB
FLUAV AG NPH QL: NEGATIVE
FLUBV AG NPH QL IA: NEGATIVE

## 2020-02-28 PROCEDURE — 99213 OFFICE O/P EST LOW 20 MIN: CPT | Performed by: NURSE PRACTITIONER

## 2020-02-28 PROCEDURE — 87804 INFLUENZA ASSAY W/OPTIC: CPT | Performed by: NURSE PRACTITIONER

## 2020-02-28 RX ORDER — FLUTICASONE PROPIONATE 50 MCG
2 SPRAY, SUSPENSION (ML) NASAL DAILY
Qty: 1 BOTTLE | Refills: 12 | Status: SHIPPED | OUTPATIENT
Start: 2020-02-28

## 2020-02-28 RX ORDER — GUAIFENESIN 600 MG/1
1200 TABLET, EXTENDED RELEASE ORAL 2 TIMES DAILY
Qty: 60 TABLET | Refills: 1 | Status: SHIPPED | OUTPATIENT
Start: 2020-02-28 | End: 2021-03-18

## 2020-02-28 RX ORDER — AZITHROMYCIN 250 MG/1
TABLET, FILM COATED ORAL
Qty: 6 TABLET | Refills: 0 | Status: SHIPPED | OUTPATIENT
Start: 2020-02-28 | End: 2021-03-18

## 2020-02-28 RX ORDER — BENZONATATE 100 MG/1
100 CAPSULE ORAL 3 TIMES DAILY PRN
Qty: 21 CAPSULE | Refills: 0 | Status: SHIPPED | OUTPATIENT
Start: 2020-02-28 | End: 2021-03-18

## 2020-02-28 NOTE — PROGRESS NOTES
Zenaida Soria is a 54 y.o. female.     History of Present Illness   C/o sinus pressure, sore throat, cough, productive cough, states symptoms started about 2-3 days ago, denies fever, she denies chills, does have some aches, she has drainage, chest congestion,she has pain with swallowing. She did try theraflu OTC. She did not have flu vaccine. She states her kids have been sick. denies diarrhea.      The following portions of the patient's history were reviewed and updated as appropriate: allergies, current medications, past family history, past medical history, past social history, past surgical history and problem list.    Review of Systems   Constitutional: Negative for chills, diaphoresis and fever.   HENT: Positive for congestion, rhinorrhea, sinus pressure and sore throat. Negative for ear pain, trouble swallowing and voice change.    Respiratory: Positive for cough. Negative for shortness of breath and wheezing.    Cardiovascular: Negative for chest pain.   Gastrointestinal: Negative for diarrhea.   Musculoskeletal: Positive for myalgias. Negative for arthralgias.   Allergic/Immunologic: Positive for environmental allergies.   Neurological: Positive for headache. Negative for dizziness and light-headedness.   All other systems reviewed and are negative.      Objective   Physical Exam   Constitutional: She is oriented to person, place, and time. She appears well-developed and well-nourished.   HENT:   Head: Normocephalic.   Right Ear: Ear canal normal. Tympanic membrane is erythematous and bulging. Tympanic membrane mobility is abnormal.   Left Ear: Tympanic membrane and ear canal normal.   Nose: Mucosal edema and rhinorrhea present. Right sinus exhibits maxillary sinus tenderness. Left sinus exhibits maxillary sinus tenderness.   Mouth/Throat: Uvula is midline and mucous membranes are normal. Posterior oropharyngeal erythema present.   Eyes: Pupils are equal, round, and reactive to light.    Neck: Normal range of motion.   Cardiovascular: Normal rate, regular rhythm, normal heart sounds and intact distal pulses.   Pulmonary/Chest: Effort normal and breath sounds normal. No respiratory distress. She has no decreased breath sounds. She has no wheezes. She has no rhonchi.   Musculoskeletal: Normal range of motion.   Lymphadenopathy:     She has no cervical adenopathy.   Neurological: She is alert and oriented to person, place, and time.   Skin: Skin is warm and dry.   Psychiatric: She has a normal mood and affect. Her behavior is normal.   Nursing note and vitals reviewed.        Assessment/Plan   Braydon was seen today for sinus pressure and cough.    Diagnoses and all orders for this visit:    Flu-like symptoms  -     Influenza Antigen, Rapid - Swab, Nasopharynx    Acute URI    Right acute serous otitis media, recurrence not specified    Other orders  -     benzonatate (TESSALON PERLES) 100 MG capsule; Take 1 capsule by mouth 3 (Three) Times a Day As Needed for Cough.  -     fluticasone (FLONASE) 50 MCG/ACT nasal spray; 2 sprays into the nostril(s) as directed by provider Daily.  -     guaiFENesin (MUCINEX) 600 MG 12 hr tablet; Take 2 tablets by mouth 2 (Two) Times a Day.  -     azithromycin (ZITHROMAX) 250 MG tablet; Take 2 tablets the first day, then 1 tablet daily for 4 days.        Flu today negative.   Start zpack, take as directed.   flonase 2 sprays each nostril daily as needed for drainage,   mucinex 2x day as needed for cough or congestion   Tessalon perles may take 3x day as needed for cough.   If symptoms persist 5-7 days call office.   Do not put anything in ears  Increase fluid intake, get plenty of rest.   Patient agrees with plan of care and understands instructions. Call if worsening symptoms or any problems or concerns.

## 2020-03-03 RX ORDER — AMLODIPINE BESYLATE 5 MG/1
5 TABLET ORAL DAILY
Qty: 90 TABLET | Refills: 1 | Status: SHIPPED | OUTPATIENT
Start: 2020-03-03 | End: 2020-09-29 | Stop reason: SDUPTHER

## 2020-03-03 NOTE — TELEPHONE ENCOUNTER
PATIENT CALLED FOR MED REFILL    amLODIPine (NORVASC) 5 MG tablet      JOAQUIN CLEMNETS [place] [phone]    PATIENT CALL BACK NUMBER [phone]

## 2020-03-23 RX ORDER — METOPROLOL SUCCINATE 50 MG/1
50 TABLET, EXTENDED RELEASE ORAL DAILY
Qty: 90 TABLET | Refills: 1 | Status: SHIPPED | OUTPATIENT
Start: 2020-03-23 | End: 2020-09-17

## 2020-03-23 NOTE — TELEPHONE ENCOUNTER
Patient is requesting refill of   metoprolol succinate XL (TOPROL XL) 50 MG 24 hr tablet  Patient states that she is currently out of the medication    Patient has verified [facility] on [address]    PLEASE ADVISE  Patient can be called back at  [phone]

## 2020-08-12 ENCOUNTER — OFFICE VISIT (OUTPATIENT)
Dept: FAMILY MEDICINE CLINIC | Facility: CLINIC | Age: 55
End: 2020-08-12

## 2020-08-12 VITALS
SYSTOLIC BLOOD PRESSURE: 148 MMHG | DIASTOLIC BLOOD PRESSURE: 76 MMHG | TEMPERATURE: 98.7 F | RESPIRATION RATE: 14 BRPM | WEIGHT: 175 LBS | OXYGEN SATURATION: 98 % | HEART RATE: 84 BPM | HEIGHT: 65 IN | BODY MASS INDEX: 29.16 KG/M2

## 2020-08-12 DIAGNOSIS — I10 ESSENTIAL HYPERTENSION: ICD-10-CM

## 2020-08-12 DIAGNOSIS — B35.1 NAIL FUNGUS: Primary | ICD-10-CM

## 2020-08-12 PROCEDURE — 99213 OFFICE O/P EST LOW 20 MIN: CPT | Performed by: INTERNAL MEDICINE

## 2020-08-12 NOTE — PROGRESS NOTES
Zenaida Soria is a 54 y.o. female.     History of Present Illness   Patient was seen for nail fungus.  Patient had nail fungus isolated to her first digit of the left foot.  Patient did not want take Lamisil 250 mg daily x6 months with LFTs and CBCs monthly.  Patient is being referred to podiatry for possible nail removal.  Patient's blood pressures been running 130s over 80s.    Dictated utilizing Dragon dictation. If there are questions or for further clarification, please contact me.  The following portions of the patient's history were reviewed and updated as appropriate: allergies, current medications, past family history, past medical history, past social history, past surgical history and problem list.    Review of Systems   Constitutional: Negative for fatigue and fever.   HENT: Positive for congestion. Negative for trouble swallowing.    Eyes: Negative for discharge and visual disturbance.   Respiratory: Negative for choking and shortness of breath.    Cardiovascular: Negative for chest pain and palpitations.   Gastrointestinal: Negative for abdominal pain and blood in stool.   Endocrine: Negative.    Genitourinary: Negative for genital sores and hematuria.   Musculoskeletal: Negative for gait problem and joint swelling.   Skin: Negative for color change, pallor, rash and wound.        Nail fungus   Allergic/Immunologic: Positive for environmental allergies. Negative for immunocompromised state.   Neurological: Negative for facial asymmetry and speech difficulty.   Psychiatric/Behavioral: Negative for hallucinations and suicidal ideas.       Objective   Physical Exam   Constitutional: She is oriented to person, place, and time. She appears well-developed and well-nourished.   HENT:   Head: Normocephalic and atraumatic.   Eyes: Pupils are equal, round, and reactive to light. Conjunctivae and EOM are normal.   Neck: Normal range of motion. Neck supple.   Cardiovascular: Normal rate, regular  rhythm and normal heart sounds.   Pulmonary/Chest: Effort normal and breath sounds normal.   Abdominal: Soft. Bowel sounds are normal.   Musculoskeletal: Normal range of motion.   Neurological: She is alert and oriented to person, place, and time.   Skin: Skin is warm and dry.   Nail fungus   Psychiatric: She has a normal mood and affect. Her behavior is normal. Judgment and thought content normal.   Nursing note and vitals reviewed.      Assessment/Plan #1 refer to podiatry #2 monitor blood pressure at home  Braydon was seen today for nail problem.    Diagnoses and all orders for this visit:    Nail fungus  -     Cancel: Ambulatory Referral to Podiatry  -     Ambulatory Referral to Podiatry  -     Thyroid Panel With TSH    Essential hypertension  -     CBC (No Diff); Future  -     Comprehensive Metabolic Panel  -     Lipid Panel  -     Vitamin D 25 Hydroxy

## 2020-08-13 ENCOUNTER — LAB (OUTPATIENT)
Dept: FAMILY MEDICINE CLINIC | Facility: CLINIC | Age: 55
End: 2020-08-13

## 2020-08-13 DIAGNOSIS — I10 ESSENTIAL HYPERTENSION: ICD-10-CM

## 2020-08-13 LAB
25(OH)D3 SERPL-MCNC: 38.9 NG/ML (ref 30–100)
ALBUMIN SERPL-MCNC: 4.3 G/DL (ref 3.5–5.2)
ALBUMIN/GLOB SERPL: 1.7 G/DL
ALP SERPL-CCNC: 58 U/L (ref 39–117)
ALT SERPL W P-5'-P-CCNC: 12 U/L (ref 1–33)
ANION GAP SERPL CALCULATED.3IONS-SCNC: 11 MMOL/L (ref 5–15)
AST SERPL-CCNC: 15 U/L (ref 1–32)
BILIRUB SERPL-MCNC: 0.3 MG/DL (ref 0–1.2)
BUN SERPL-MCNC: 15 MG/DL (ref 6–20)
BUN/CREAT SERPL: 23.1 (ref 7–25)
CALCIUM SPEC-SCNC: 9.9 MG/DL (ref 8.6–10.5)
CHLORIDE SERPL-SCNC: 102 MMOL/L (ref 98–107)
CHOLEST SERPL-MCNC: 266 MG/DL (ref 0–200)
CO2 SERPL-SCNC: 27 MMOL/L (ref 22–29)
CREAT SERPL-MCNC: 0.65 MG/DL (ref 0.57–1)
DEPRECATED RDW RBC AUTO: 39.9 FL (ref 37–54)
ERYTHROCYTE [DISTWIDTH] IN BLOOD BY AUTOMATED COUNT: 12.7 % (ref 12.3–15.4)
GFR SERPL CREATININE-BSD FRML MDRD: 95 ML/MIN/1.73
GLOBULIN UR ELPH-MCNC: 2.6 GM/DL
GLUCOSE SERPL-MCNC: 95 MG/DL (ref 65–99)
HCT VFR BLD AUTO: 41 % (ref 34–46.6)
HDLC SERPL-MCNC: 53 MG/DL (ref 40–60)
HGB BLD-MCNC: 13.9 G/DL (ref 12–15.9)
LDLC SERPL CALC-MCNC: 177 MG/DL (ref 0–100)
LDLC/HDLC SERPL: 3.34 {RATIO}
MCH RBC QN AUTO: 29.5 PG (ref 26.6–33)
MCHC RBC AUTO-ENTMCNC: 33.9 G/DL (ref 31.5–35.7)
MCV RBC AUTO: 87 FL (ref 79–97)
PLATELET # BLD AUTO: 306 10*3/MM3 (ref 140–450)
PMV BLD AUTO: 9.9 FL (ref 6–12)
POTASSIUM SERPL-SCNC: 5.6 MMOL/L (ref 3.5–5.2)
PROT SERPL-MCNC: 6.9 G/DL (ref 6–8.5)
RBC # BLD AUTO: 4.71 10*6/MM3 (ref 3.77–5.28)
SODIUM SERPL-SCNC: 140 MMOL/L (ref 136–145)
T-UPTAKE NFR SERPL: 1.11 TBI (ref 0.8–1.3)
T4 SERPL-MCNC: 9.96 MCG/DL (ref 4.5–11.7)
TRIGL SERPL-MCNC: 179 MG/DL (ref 0–150)
TSH SERPL DL<=0.05 MIU/L-ACNC: 2.39 UIU/ML (ref 0.27–4.2)
VLDLC SERPL-MCNC: 35.8 MG/DL (ref 5–40)
WBC # BLD AUTO: 9.09 10*3/MM3 (ref 3.4–10.8)

## 2020-08-13 PROCEDURE — 84479 ASSAY OF THYROID (T3 OR T4): CPT | Performed by: INTERNAL MEDICINE

## 2020-08-13 PROCEDURE — 85027 COMPLETE CBC AUTOMATED: CPT | Performed by: INTERNAL MEDICINE

## 2020-08-13 PROCEDURE — 84443 ASSAY THYROID STIM HORMONE: CPT | Performed by: INTERNAL MEDICINE

## 2020-08-13 PROCEDURE — 36415 COLL VENOUS BLD VENIPUNCTURE: CPT | Performed by: INTERNAL MEDICINE

## 2020-08-13 PROCEDURE — 82306 VITAMIN D 25 HYDROXY: CPT | Performed by: INTERNAL MEDICINE

## 2020-08-13 PROCEDURE — 80061 LIPID PANEL: CPT | Performed by: INTERNAL MEDICINE

## 2020-08-13 PROCEDURE — 80053 COMPREHEN METABOLIC PANEL: CPT | Performed by: INTERNAL MEDICINE

## 2020-08-13 PROCEDURE — 84436 ASSAY OF TOTAL THYROXINE: CPT | Performed by: INTERNAL MEDICINE

## 2020-09-17 RX ORDER — METOPROLOL SUCCINATE 50 MG/1
TABLET, EXTENDED RELEASE ORAL
Qty: 90 TABLET | Refills: 0 | Status: SHIPPED | OUTPATIENT
Start: 2020-09-17 | End: 2020-09-29 | Stop reason: SDUPTHER

## 2020-09-29 RX ORDER — AMLODIPINE BESYLATE 5 MG/1
5 TABLET ORAL DAILY
Qty: 90 TABLET | Refills: 1 | Status: SHIPPED | OUTPATIENT
Start: 2020-09-29 | End: 2020-09-29 | Stop reason: SDUPTHER

## 2020-09-29 RX ORDER — AMLODIPINE BESYLATE 5 MG/1
5 TABLET ORAL DAILY
Qty: 90 TABLET | Refills: 1 | Status: SHIPPED | OUTPATIENT
Start: 2020-09-29 | End: 2021-06-10 | Stop reason: SDUPTHER

## 2020-09-29 RX ORDER — METOPROLOL SUCCINATE 50 MG/1
50 TABLET, EXTENDED RELEASE ORAL DAILY
Qty: 90 TABLET | Refills: 0 | Status: SHIPPED | OUTPATIENT
Start: 2020-09-29 | End: 2021-03-08

## 2020-09-29 NOTE — TELEPHONE ENCOUNTER
PATIENT IS CALLING TO REFILL amLODIPine (NORVASC) 5 MG tablet    PATIENT IS OUT OF THIS MEDICATION    PHARMACY HAS FAXED OVER A REQUEST FOR THIS MEDICATION    ON THE BOTTLE IT HAD ANOTHER DOCTORS NAME ON THE BOTTLE A  DR. DEMARIO VILLEGAS    PHARMACY:  [address] ([facility] [place] - [address] [facility] [phone] [facility] [phone] FX      PATIENT @[phone]

## 2021-03-08 RX ORDER — METOPROLOL SUCCINATE 50 MG/1
TABLET, EXTENDED RELEASE ORAL
Qty: 90 TABLET | Refills: 0 | Status: SHIPPED | OUTPATIENT
Start: 2021-03-08 | End: 2021-03-18 | Stop reason: ALTCHOICE

## 2021-03-18 ENCOUNTER — OFFICE VISIT (OUTPATIENT)
Dept: CARDIOLOGY | Facility: CLINIC | Age: 56
End: 2021-03-18

## 2021-03-18 VITALS
SYSTOLIC BLOOD PRESSURE: 159 MMHG | BODY MASS INDEX: 28.99 KG/M2 | HEART RATE: 82 BPM | HEIGHT: 65 IN | WEIGHT: 174 LBS | DIASTOLIC BLOOD PRESSURE: 84 MMHG

## 2021-03-18 DIAGNOSIS — R07.89 CHEST PAIN, ATYPICAL: Primary | ICD-10-CM

## 2021-03-18 DIAGNOSIS — R00.2 PALPITATIONS: ICD-10-CM

## 2021-03-18 DIAGNOSIS — E78.00 PURE HYPERCHOLESTEROLEMIA: ICD-10-CM

## 2021-03-18 DIAGNOSIS — I10 ESSENTIAL HYPERTENSION: ICD-10-CM

## 2021-03-18 DIAGNOSIS — R06.02 SOB (SHORTNESS OF BREATH): ICD-10-CM

## 2021-03-18 PROCEDURE — 99203 OFFICE O/P NEW LOW 30 MIN: CPT | Performed by: INTERNAL MEDICINE

## 2021-03-18 PROCEDURE — 93000 ELECTROCARDIOGRAM COMPLETE: CPT | Performed by: INTERNAL MEDICINE

## 2021-03-18 RX ORDER — BISOPROLOL FUMARATE AND HYDROCHLOROTHIAZIDE 10; 6.25 MG/1; MG/1
1 TABLET ORAL DAILY
Qty: 30 TABLET | Refills: 6 | Status: SHIPPED | OUTPATIENT
Start: 2021-03-18 | End: 2021-06-10 | Stop reason: SDUPTHER

## 2021-03-18 RX ORDER — UBIDECARENONE 75 MG
50 CAPSULE ORAL DAILY
COMMUNITY

## 2021-03-18 NOTE — PROGRESS NOTES
NEW PT. SELF REFERRED.   Subjective:        [facility]  Cardiology Consult Note    Patient Identification:  Name: Braydon Soria  Age: 55 y.o.  Sex: female  :  1965  MRN: [id number]             CC  HTN    CHEST PAIN    SOB    OCCASINAL PALPITATION    History of Present Illness:   55-year-old female here for the cardiac evaluation as well as establishment of the care as the patient complaining of chest pain and shortness of breath with occasional palpitation    Braydon Soria has been complaining of the shortness of breath mild-to-moderate in intensity with mild-to-moderate usually relieved with rest associated with anxiety and fatigue        Comorbid cardiac risk factors:     Past Medical History:  Past Medical History:   Diagnosis Date   • Anxiety    • Arthritis    • Chest pressure    • Depression    • Dyspnea    • Gallbladder disease     gallstones   • Hormone replacement therapy (HRT)    • Hypercholesterolemia    • Hyperlipidemia    • Hypertension    • Insomnia    • Low back pain    • Lumbosacral disc disease    • Migraines    • Peripheral neuropathy    • Rheumatoid arthritis (CMS/HCC)    • SOB (shortness of breath)    • Thyroid nodule      Past Surgical History:  Past Surgical History:   Procedure Laterality Date   •  SECTION      CHILDREN WERE BORN IN [facility]   • CHOLECYSTECTOMY WITH INTRAOPERATIVE CHOLANGIOGRAM N/A 2017    Procedure: CHOLECYSTECTOMY LAPAROSCOPIC INTRAOPERATIVE CHOLANGIOGRAM;  Surgeon: Molina Smith MD;  [place] [facility] [place] [facility] [place];  Service:    • COLONOSCOPY N/A 2016    Procedure: COLONOSCOPY TO CECUM WITH HOT SNARE POLYPECTOMY AND CLIPx1;  Surgeon: Molina Smith MD;  Location: [facility] ENDOSCOPY;  Service:    • ENDOSCOPY N/A 2016    Procedure: ESOPHAGOGASTRODUODENOSCOPY;  Surgeon: Molina Smith MD;  Location: [facility] ENDOSCOPY;  Service:    • HYSTERECTOMY     • OOPHORECTOMY     • SINUS SURGERY     • TONSILLECTOMY         Allergies:  No Known Allergies  Home Meds:  (Not in a hospital admission)    Current Meds:   [unfilled]  Social History:   Social History     Tobacco Use   • Smoking status: Former Smoker   • Smokeless tobacco: Never Used   • Tobacco comment: smokes occasionally   Substance Use Topics   • Alcohol use: No     Comment: daily caffiene      Family History:  Family History   Problem Relation Age of Onset   • Stroke Mother    • Migraines Mother    • Heart disease Father    • Heart attack Father    • Heart disease Brother    • Heart attack Brother    • Stroke Paternal Grandmother         Review of Systems    Constitutional: No weakness,fatigue, fever, rigors, chills   Eyes: No vision changes, eye pain   ENT/oropharynx: No difficulty swallowing, sore throat, epistaxis, changes in hearing   Cardiovascular:  Chest pain and shortness of   Respiratory: No shortness of breath, dyspnea on exertion, cough, wheezing hemoptysis   Gastrointestinal: No abdominal pain, nausea, vomiting, diarrhea, bloody stools   Genitourinary: No hematuria, dysuria   Neurological: No headache, tremors, numbness,  one-sided weakness    Musculoskeletal: No cramps, myalgias,  joint pain, joint swelling   Integument: No rash, edema           Constitutional:  Heart Rate:  [82] 82  BP: (159)/(84) 159/84    Physical Exam   General:  Appears in no acute distress  Eyes: PERTL,  HEENT:  No JVD. Thyroid not visibly enlarged. No mucosal pallor or cyanosis  Respiratory: Respirations regular and unlabored at rest. BBS with good air entry in all fields. No crackles, rubs or wheezes auscultated  Cardiovascular: S1S2 Regular rate and rhythm. No murmur, rub or gallop auscultated. No carotid bruits. DP/PT pulses    . No pretibial pitting edema  Gastrointestinal: Abdomen soft, flat, non tender. Bowel sounds present. No hepatosplenomegaly. No ascites  Musculoskeletal: MOBLEY x4. No abnormal movements  Extremities: No digital clubbing or cyanosis  Skin: Color pink. Skin warm  and dry to touch. No rashes  No xanthoma  Neuro: AAO x3 CN II-XII grossly intact            ECG 12 Lead    Date/Time: 3/18/2021 2:39 PM  Performed by: Lakhwinder Amos MD  Authorized by: Lakhwinder Amos MD   Comparison: compared with previous ECG   Similar to previous ECG  Rhythm: sinus rhythm    Clinical impression: non-specific ECG                Cardiographics  ECG:     Telemetry:    Echocardiogram:     Imaging  Chest X-ray:     Lab Review               @[email or web address]@              Assessment:/ Recommendations / Plan:   Patient Active Problem List   Diagnosis   • Midline low back pain with left-sided sciatica   • Hyperreflexia   • Incomplete bladder emptying   • Thyroid enlargement   • Essential hypertension   • Chronic bilateral low back pain with bilateral sciatica   • DDD (degenerative disc disease), lumbar   • Lumbar spondylolysis   • Atrophy of muscle of lower leg   • Arthritis of both hands   • Patellar tendonitis of both knees   • Chondromalacia of patella   • Palpitations   • Abnormal echocardiogram   • Degeneration of intervertebral disc   • Vitamin D deficiency   • Seasonal allergies   • Posttraumatic stress disorder   • Hyperlipidemia   • Depressive disorder   • Chronic back pain   • Body mass index (BMI) of 25.0 to 29.9   • Anxiety   • Rheumatoid arteritis (CMS/HCC)                    ICD-10-CM ICD-9-CM   1. Chest pain, atypical  R07.89 786.59   2. Palpitations  R00.2 785.1   3. Essential hypertension  I10 401.9   4. Pure hypercholesterolemia  E78.00 272.0   5. SOB (shortness of breath)  R06.02 786.05     1. Palpitations  Considering the patient's symptoms as well as clinical situation and  EKG findings, along with cardiac risk factors, ischemic workup is necessary to rule out ischemic cardiomyopathy, stress induced arrhythmias, and functional capacity for diagnosis as well as prognostic consideration    - Stress Test With Myocardial Perfusion One Day  - Adult Transthoracic Echo Complete  W/ Cont if Necessary Per Protocol; Future    2. Essential hypertension  Blood pressure under control  - Stress Test With Myocardial Perfusion One Day  - Adult Transthoracic Echo Complete W/ Cont if Necessary Per Protocol; Future    3. Pure hypercholesterolemia  Continue current treatment  - Stress Test With Myocardial Perfusion One Day  - Adult Transthoracic Echo Complete W/ Cont if Necessary Per Protocol; Future    4. SOB (shortness of breath)  Considering patient's medical condition as well as the risk factors, patient will require echocardiogram for further evaluation for the LV function, four-chamber evaluation, including the pressures, valvular function and  pericardial disease and pericardial effusion    - Stress Test With Myocardial Perfusion One Day  - Adult Transthoracic Echo Complete W/ Cont if Necessary Per Protocol; Future    5. Chest pain, atypical  Considering the patient's symptoms as well as clinical situation and  EKG findings, along with cardiac risk factors, ischemic workup is necessary to rule out ischemic cardiomyopathy, stress induced arrhythmias, and functional capacity for diagnosis as well as prognostic consideration    - Stress Test With Myocardial Perfusion One Day  - Adult Transthoracic Echo Complete W/ Cont if Necessary Per Protocol; Future       DC TOPROL    START ZIAC 10/6.25  STRESS CARDIOLYTE, ECHO    SEE IN 1 MONTH    Labs/tests ordered for arun Amos MD  3/18/2021, 14:33 EDT      EMR Dragon/Transcription:   Dictated utilizing Dragon dictation

## 2021-03-26 ENCOUNTER — BULK ORDERING (OUTPATIENT)
Dept: CASE MANAGEMENT | Facility: OTHER | Age: 56
End: 2021-03-26

## 2021-03-26 DIAGNOSIS — Z23 IMMUNIZATION DUE: ICD-10-CM

## 2021-04-01 ENCOUNTER — TRANSCRIBE ORDERS (OUTPATIENT)
Dept: ADMINISTRATIVE | Facility: HOSPITAL | Age: 56
End: 2021-04-01

## 2021-04-01 DIAGNOSIS — Z01.818 OTHER SPECIFIED PRE-OPERATIVE EXAMINATION: Primary | ICD-10-CM

## 2021-04-06 ENCOUNTER — LAB (OUTPATIENT)
Dept: LAB | Facility: HOSPITAL | Age: 56
End: 2021-04-06

## 2021-04-06 ENCOUNTER — OFFICE VISIT (OUTPATIENT)
Dept: FAMILY MEDICINE CLINIC | Facility: CLINIC | Age: 56
End: 2021-04-06

## 2021-04-06 VITALS
SYSTOLIC BLOOD PRESSURE: 130 MMHG | HEIGHT: 65 IN | TEMPERATURE: 97.8 F | RESPIRATION RATE: 16 BRPM | WEIGHT: 187 LBS | DIASTOLIC BLOOD PRESSURE: 82 MMHG | OXYGEN SATURATION: 98 % | HEART RATE: 66 BPM | BODY MASS INDEX: 31.16 KG/M2

## 2021-04-06 DIAGNOSIS — I10 ESSENTIAL HYPERTENSION: ICD-10-CM

## 2021-04-06 DIAGNOSIS — Z01.818 OTHER SPECIFIED PRE-OPERATIVE EXAMINATION: ICD-10-CM

## 2021-04-06 DIAGNOSIS — E78.00 PURE HYPERCHOLESTEROLEMIA: ICD-10-CM

## 2021-04-06 DIAGNOSIS — E55.9 VITAMIN D DEFICIENCY: ICD-10-CM

## 2021-04-06 DIAGNOSIS — R30.0 DYSURIA: ICD-10-CM

## 2021-04-06 DIAGNOSIS — Z00.00 MEDICARE ANNUAL WELLNESS VISIT, SUBSEQUENT: Primary | ICD-10-CM

## 2021-04-06 LAB
25(OH)D3 SERPL-MCNC: 41.4 NG/ML (ref 30–100)
ALBUMIN SERPL-MCNC: 4.5 G/DL (ref 3.5–5.2)
ALBUMIN/GLOB SERPL: 1.8 G/DL
ALP SERPL-CCNC: 71 U/L (ref 39–117)
ALT SERPL W P-5'-P-CCNC: 14 U/L (ref 1–33)
ANION GAP SERPL CALCULATED.3IONS-SCNC: 10.2 MMOL/L (ref 5–15)
AST SERPL-CCNC: 20 U/L (ref 1–32)
BACTERIA UR QL AUTO: NORMAL /HPF
BILIRUB SERPL-MCNC: 0.4 MG/DL (ref 0–1.2)
BILIRUB UR QL STRIP: NEGATIVE
BUN SERPL-MCNC: 13 MG/DL (ref 6–20)
BUN/CREAT SERPL: 19.1 (ref 7–25)
CALCIUM SPEC-SCNC: 9.9 MG/DL (ref 8.6–10.5)
CHLORIDE SERPL-SCNC: 98 MMOL/L (ref 98–107)
CHOLEST SERPL-MCNC: 287 MG/DL (ref 0–200)
CLARITY UR: CLEAR
CO2 SERPL-SCNC: 28.8 MMOL/L (ref 22–29)
COLOR UR: YELLOW
CREAT SERPL-MCNC: 0.68 MG/DL (ref 0.57–1)
DEPRECATED RDW RBC AUTO: 44.5 FL (ref 37–54)
ERYTHROCYTE [DISTWIDTH] IN BLOOD BY AUTOMATED COUNT: 13.2 % (ref 12.3–15.4)
FOLATE SERPL-MCNC: 17 NG/ML (ref 4.78–24.2)
GFR SERPL CREATININE-BSD FRML MDRD: 90 ML/MIN/1.73
GLOBULIN UR ELPH-MCNC: 2.5 GM/DL
GLUCOSE SERPL-MCNC: 95 MG/DL (ref 65–99)
GLUCOSE UR STRIP-MCNC: NEGATIVE MG/DL
HCT VFR BLD AUTO: 42.7 % (ref 34–46.6)
HDLC SERPL-MCNC: 54 MG/DL (ref 40–60)
HGB BLD-MCNC: 14.4 G/DL (ref 12–15.9)
HGB UR QL STRIP.AUTO: ABNORMAL
HYALINE CASTS UR QL AUTO: NORMAL /LPF
KETONES UR QL STRIP: NEGATIVE
LDLC SERPL CALC-MCNC: 188 MG/DL (ref 0–100)
LDLC/HDLC SERPL: 3.45 {RATIO}
LEUKOCYTE ESTERASE UR QL STRIP.AUTO: NEGATIVE
MCH RBC QN AUTO: 30.6 PG (ref 26.6–33)
MCHC RBC AUTO-ENTMCNC: 33.7 G/DL (ref 31.5–35.7)
MCV RBC AUTO: 90.9 FL (ref 79–97)
NITRITE UR QL STRIP: NEGATIVE
PH UR STRIP.AUTO: 5.5 [PH] (ref 5–8)
PLATELET # BLD AUTO: 326 10*3/MM3 (ref 140–450)
PMV BLD AUTO: 9.5 FL (ref 6–12)
POTASSIUM SERPL-SCNC: 4.3 MMOL/L (ref 3.5–5.2)
PROT SERPL-MCNC: 7 G/DL (ref 6–8.5)
PROT UR QL STRIP: NEGATIVE
RBC # BLD AUTO: 4.7 10*6/MM3 (ref 3.77–5.28)
RBC # UR: NORMAL /HPF
REF LAB TEST METHOD: NORMAL
SARS-COV-2 ORF1AB RESP QL NAA+PROBE: NOT DETECTED
SODIUM SERPL-SCNC: 137 MMOL/L (ref 136–145)
SP GR UR STRIP: 1.01 (ref 1–1.03)
SQUAMOUS #/AREA URNS HPF: NORMAL /HPF
TRIGL SERPL-MCNC: 234 MG/DL (ref 0–150)
UROBILINOGEN UR QL STRIP: ABNORMAL
VIT B12 BLD-MCNC: 962 PG/ML (ref 211–946)
VLDLC SERPL-MCNC: 45 MG/DL (ref 5–40)
WBC # BLD AUTO: 8.21 10*3/MM3 (ref 3.4–10.8)
WBC UR QL AUTO: NORMAL /HPF

## 2021-04-06 PROCEDURE — 87086 URINE CULTURE/COLONY COUNT: CPT | Performed by: INTERNAL MEDICINE

## 2021-04-06 PROCEDURE — U0004 COV-19 TEST NON-CDC HGH THRU: HCPCS

## 2021-04-06 PROCEDURE — 36415 COLL VENOUS BLD VENIPUNCTURE: CPT | Performed by: INTERNAL MEDICINE

## 2021-04-06 PROCEDURE — C9803 HOPD COVID-19 SPEC COLLECT: HCPCS

## 2021-04-06 PROCEDURE — U0005 INFEC AGEN DETEC AMPLI PROBE: HCPCS

## 2021-04-06 PROCEDURE — 85027 COMPLETE CBC AUTOMATED: CPT | Performed by: INTERNAL MEDICINE

## 2021-04-06 PROCEDURE — G0439 PPPS, SUBSEQ VISIT: HCPCS | Performed by: INTERNAL MEDICINE

## 2021-04-06 PROCEDURE — 81001 URINALYSIS AUTO W/SCOPE: CPT | Performed by: INTERNAL MEDICINE

## 2021-04-06 PROCEDURE — 82306 VITAMIN D 25 HYDROXY: CPT | Performed by: INTERNAL MEDICINE

## 2021-04-06 PROCEDURE — 80053 COMPREHEN METABOLIC PANEL: CPT | Performed by: INTERNAL MEDICINE

## 2021-04-06 PROCEDURE — 99214 OFFICE O/P EST MOD 30 MIN: CPT | Performed by: INTERNAL MEDICINE

## 2021-04-06 PROCEDURE — 82746 ASSAY OF FOLIC ACID SERUM: CPT | Performed by: INTERNAL MEDICINE

## 2021-04-06 PROCEDURE — 82607 VITAMIN B-12: CPT | Performed by: INTERNAL MEDICINE

## 2021-04-06 PROCEDURE — 80061 LIPID PANEL: CPT | Performed by: INTERNAL MEDICINE

## 2021-04-06 NOTE — PATIENT INSTRUCTIONS
Medicare Wellness  Personal Prevention Plan of Service     Date of Office Visit:  2021  Encounter Provider:  Pawan Arambula MD  Place of Service:  [facility]  Patient Name: Braydon Soria  :  1965    As part of the Medicare Wellness portion of your visit today, we are providing you with this personalized preventive plan of services (PPPS). This plan is based upon recommendations of the [place] Preventive Services Task Force (USPSTF) and the Advisory Committee on Immunization Practices (ACIP).    This lists the preventive care services that should be considered, and provides dates of when you are due. Items listed as completed are up-to-date and do not require any further intervention.    Health Maintenance   Topic Date Due   • TDAP/TD VACCINES (2 - Tdap) 2009   • ZOSTER VACCINE (1 of 2) Never done   • PAP SMEAR  Never done   • INFLUENZA VACCINE  2021   • LIPID PANEL  2021   • MAMMOGRAM  2021   • ANNUAL WELLNESS VISIT  2022   • COLONOSCOPY  2030   • HEPATITIS C SCREENING  Completed   • Pneumococcal Vaccine 0-64  Aged Out   • MENINGOCOCCAL VACCINE  Aged Out       Orders Placed This Encounter   Procedures   • Urine Culture - Urine, Urine, Clean Catch     Order Specific Question:   Release to patient     Answer:   Immediate   • CBC (No Diff)     Standing Status:   Future     Number of Occurrences:   1     Standing Expiration Date:   2022     Order Specific Question:   Release to patient     Answer:   Immediate   • Comprehensive Metabolic Panel     Order Specific Question:   Release to patient     Answer:   Immediate   • Lipid Panel   • Vitamin D 25 Hydroxy     Order Specific Question:   Release to patient     Answer:   Immediate   • Vitamin B12 & Folate     Order Specific Question:   Release to patient     Answer:   Immediate   • Urinalysis without microscopic (no culture) - Urine, Clean Catch     Order Specific Question:    Release to patient     Answer:   Immediate   • Urinalysis, Microscopic Only - Urine, Clean Catch     Order Specific Question:   Release to patient     Answer:   Immediate   • Urinalysis With Microscopic - Urine, Clean Catch     Order Specific Question:   Release to patient     Answer:   Immediate       Return in about 6 months (around 10/6/2021), or if symptoms worsen or fail to improve, for Recheck.

## 2021-04-06 NOTE — PROGRESS NOTES
Subsequent Medicare Wellness Visit   The ABC's of the Annual Wellness Visit    Chief Complaint   Patient presents with   • Labs Only     pt fasting   • Annual Exam       HPI:  Braydon Soria, -1965, is a 55 y.o. female who presents for a Subsequent Medicare Wellness Visit.  Patient seen for Medicare wellness exam.  Patient seen for hypertension.  Pressures been running 130s over 80s.  Patient will continue Ziac, amlodipine.  Patient's lipids are being treated with diet and exercise.  Labs are being drawn today and patient will follow up.  Patient does have a vitamin D deficiency is being supplemented with 5000 units daily.  Patient did have dysuria and urinalysis pending at time of this dictation.    Dictated utilizing Dragon dictation. If there are questions or for further clarification, please contact me.  Recent Hospitalizations:  No hospitalization(s) within the last year..    Current Medical Providers:  Patient Care Team:  Pawan Arambula MD as PCP - General (Internal Medicine)  Ramiro Carbajal MD as PCP - Family Medicine  Molina Smith MD as Surgeon (General Surgery)  Remignton Barboza MD as Consulting Physician (Neurology)  Jordan Aponte MD as Surgeon (Orthopedic Surgery)    Health Habits and Functional and Cognitive Screening and Depression Screening:  Functional & Cognitive Status 2021   Do you have difficulty preparing food and eating? No   Do you have difficulty bathing yourself, getting dressed or grooming yourself? No   Do you have difficulty using the toilet? No   Do you have difficulty moving around from place to place? No   Do you have trouble with steps or getting out of a bed or a chair? No   Current Diet Well Balanced Diet   Dental Exam Up to date   Eye Exam Up to date   Exercise (times per week) 0 times per week   Current Exercises Include No Regular Exercise   Current Exercise Activities Include -   Do you need help using the phone?  No   Are you deaf or do you have  serious difficulty hearing?  No   Do you need help with transportation? No   Do you need help shopping? No   Do you need help preparing meals?  No   Do you need help with housework?  No   Do you need help with laundry? No   Do you need help taking your medications? No   Do you need help managing money? No   Do you ever drive or ride in a car without wearing a seat belt? No   Have you felt unusual stress, anger or loneliness in the last month? No   Who do you live with? Spouse   If you need help, do you have trouble finding someone available to you? No   Have you been bothered in the last four weeks by sexual problems? No   Do you have difficulty concentrating, remembering or making decisions? No       Compared to one year ago, the patient feels her physical health is the same and her mental health is the same.    Depression Screen:  PHQ-2/PHQ-9 Depression Screening 4/6/2021   Little interest or pleasure in doing things 0   Feeling down, depressed, or hopeless 0   Trouble falling or staying asleep, or sleeping too much 0   Feeling tired or having little energy 0   Poor appetite or overeating 0   Feeling bad about yourself - or that you are a failure or have let yourself or your family down 0   Trouble concentrating on things, such as reading the newspaper or watching television 0   Moving or speaking so slowly that other people could have noticed. Or the opposite - being so fidgety or restless that you have been moving around a lot more than usual 0   Thoughts that you would be better off dead, or of hurting yourself in some way 0   Total Score 0   If you checked off any problems, how difficult have these problems made it for you to do your work, take care of things at home, or get along with other people? Not difficult at all         Past Medical/Family/Social History:  The following portions of the patient's history were reviewed and updated as appropriate: allergies, current medications, past family history, past  medical history, past social history, past surgical history and problem list.    No Known Allergies      Current Outpatient Medications:   •  amLODIPine (NORVASC) 5 MG tablet, Take 1 tablet by mouth Daily., Disp: 90 tablet, Rfl: 1  •  bisoprolol-hydrochlorothiazide (Ziac) 10-6.25 MG per tablet, Take 1 tablet by mouth Daily., Disp: 30 tablet, Rfl: 6  •  citalopram (CeleXA) 40 MG tablet, Take 40 mg by mouth Daily., Disp: , Rfl:   •  vitamin B-12 (CYANOCOBALAMIN) 100 MCG tablet, Take 50 mcg by mouth Daily., Disp: , Rfl:   •  vitamin D3 125 MCG (5000 UT) capsule capsule, Take 5,000 Units by mouth Daily., Disp: , Rfl:   •  fluticasone (FLONASE) 50 MCG/ACT nasal spray, 2 sprays into the nostril(s) as directed by provider Daily., Disp: 1 bottle, Rfl: 12    Aspirin use counseling: Does not need ASA (and currently is not on it)    Current medication list contains no high risk medications.  No harmful drug interactions have been identified.     Family History   Problem Relation Age of Onset   • Stroke Mother    • Migraines Mother    • Heart disease Father    • Heart attack Father    • Heart disease Brother    • Heart attack Brother    • Stroke Paternal Grandmother        Social History     Tobacco Use   • Smoking status: Former Smoker   • Smokeless tobacco: Never Used   • Tobacco comment: smokes occasionally   Substance Use Topics   • Alcohol use: No     Comment: daily caffiene       Past Surgical History:   Procedure Laterality Date   •  SECTION  ,    CHILDREN WERE BORN IN [facility]   • CHOLECYSTECTOMY WITH INTRAOPERATIVE CHOLANGIOGRAM N/A 2017    Procedure: CHOLECYSTECTOMY LAPAROSCOPIC INTRAOPERATIVE CHOLANGIOGRAM;  Surgeon: Molina Smith MD;  Location: [facility];  Service:    • COLONOSCOPY N/A 2016    Procedure: COLONOSCOPY TO CECUM WITH HOT SNARE POLYPECTOMY AND CLIPx1;  Surgeon: Molina Smith MD;  Location: [facility] ENDOSCOPY;  Service:    • ENDOSCOPY N/A 2016    Procedure:  ESOPHAGOGASTRODUODENOSCOPY;  Surgeon: Molina Smith MD;  Location: [facility] ENDOSCOPY;  Service:    • HYSTERECTOMY     • OOPHORECTOMY     • SINUS SURGERY     • TONSILLECTOMY         Patient Active Problem List   Diagnosis   • Midline low back pain with left-sided sciatica   • Hyperreflexia   • Incomplete bladder emptying   • Thyroid enlargement   • Essential hypertension   • Chronic bilateral low back pain with bilateral sciatica   • DDD (degenerative disc disease), lumbar   • Lumbar spondylolysis   • Atrophy of muscle of lower leg   • Arthritis of both hands   • Patellar tendonitis of both knees   • Chondromalacia of patella   • Palpitations   • Abnormal echocardiogram   • Degeneration of intervertebral disc   • Vitamin D deficiency   • Seasonal allergies   • Posttraumatic stress disorder   • Hyperlipidemia   • Depressive disorder   • Chronic back pain   • Body mass index (BMI) of 25.0 to 29.9   • Anxiety   • Rheumatoid arteritis (CMS/HCC)   • SOB (shortness of breath)       Review of Systems   Constitutional: Negative for fatigue and fever.   HENT: Positive for congestion. Negative for trouble swallowing.    Eyes: Negative for discharge and visual disturbance.   Respiratory: Negative for choking and shortness of breath.    Cardiovascular: Negative for chest pain and palpitations.   Gastrointestinal: Negative for abdominal pain and blood in stool.   Endocrine: Negative.    Genitourinary: Negative for genital sores and hematuria.   Musculoskeletal: Negative for gait problem and joint swelling.   Skin: Negative for color change, pallor, rash and wound.   Allergic/Immunologic: Positive for environmental allergies. Negative for immunocompromised state.   Neurological: Negative for facial asymmetry and speech difficulty.   Psychiatric/Behavioral: Negative for hallucinations and suicidal ideas.       Objective     Vitals:    04/06/21 1032   BP: 130/82   BP Location: Left arm   Patient Position: Sitting   Cuff Size:  "Adult   Pulse: 66   Resp: 16   Temp: 97.8 °F (36.6 °C)   TempSrc: Infrared   SpO2: 98%   Weight: 84.8 kg (187 lb)   Height: 165.1 cm (65\")   PainSc: 0-No pain       Patient's Body mass index is 31.12 kg/m². BMI is within normal parameters. No follow-up required..      No exam data present    The patient has no evidence of cognitve impairment.     Physical Exam  Vitals and nursing note reviewed.   Constitutional:       Appearance: Normal appearance. She is well-developed.   HENT:      Head: Normocephalic and atraumatic.      Nose: Nose normal.      Mouth/Throat:      Mouth: Mucous membranes are moist.      Pharynx: Oropharynx is clear.   Eyes:      Extraocular Movements: Extraocular movements intact.      Conjunctiva/sclera: Conjunctivae normal.      Pupils: Pupils are equal, round, and reactive to light.   Cardiovascular:      Rate and Rhythm: Normal rate and regular rhythm.      Heart sounds: No murmur heard.   No friction rub. No gallop.    Pulmonary:      Effort: Pulmonary effort is normal. No respiratory distress.      Breath sounds: Normal breath sounds. No wheezing, rhonchi or rales.   Chest:      Chest wall: No tenderness.   Abdominal:      General: Bowel sounds are normal.      Palpations: Abdomen is soft.   Musculoskeletal:         General: Normal range of motion.      Cervical back: Normal range of motion and neck supple.   Skin:     General: Skin is warm and dry.   Neurological:      General: No focal deficit present.      Mental Status: She is alert and oriented to person, place, and time. Mental status is at baseline.   Psychiatric:         Mood and Affect: Mood normal.         Behavior: Behavior normal.         Thought Content: Thought content normal.         Judgment: Judgment normal.         Recent Lab Results:  Lab Results   Component Value Date    GLU 94 09/10/2020     Lab Results   Component Value Date    CHOL 266 (H) 08/13/2020    TRIG 179 (H) 08/13/2020    HDL 53 08/13/2020    VLDL 35.8 " 08/13/2020    LDLHDL 3.34 08/13/2020       Assessment/Plan #1 continue present treatment for hypertension #2 labs #3 continue present diet and activity levels  Age-appropriate Screening Schedule:  Refer to the list below for future screening recommendations based on patient's age, sex and/or medical conditions.      Health Maintenance   Topic Date Due   • TDAP/TD VACCINES (2 - Tdap) 04/14/2009   • ZOSTER VACCINE (1 of 2) Never done   • PAP SMEAR  Never done   • INFLUENZA VACCINE  08/01/2021   • LIPID PANEL  08/13/2021   • MAMMOGRAM  11/16/2021   • COLONOSCOPY  02/21/2030       Medicare Risks and Personalized Health Plan:  Advance Directive Discussion      CMS-Preventive Services Quick Reference  Medicare Preventive Services Addressed:  NA    Advance Care Planning:  ACP discussion was held with the patient during this visit. Patient does not have an advance directive, information provided.    Diagnoses and all orders for this visit:    1. Medicare annual wellness visit, subsequent (Primary)    2. Pure hypercholesterolemia  -     CBC (No Diff); Future  -     Comprehensive Metabolic Panel  -     Lipid Panel  -     Vitamin D 25 Hydroxy  -     Vitamin B12 & Folate  -     Urinalysis With Microscopic - Urine, Clean Catch  -     Urine Culture - Urine, Urine, Clean Catch  -     CBC (No Diff)    3. Essential hypertension  -     CBC (No Diff); Future  -     Comprehensive Metabolic Panel  -     Lipid Panel  -     Vitamin D 25 Hydroxy  -     Vitamin B12 & Folate  -     Urinalysis With Microscopic - Urine, Clean Catch  -     Urine Culture - Urine, Urine, Clean Catch  -     CBC (No Diff)    4. Vitamin D deficiency  -     CBC (No Diff); Future  -     Comprehensive Metabolic Panel  -     Lipid Panel  -     Vitamin D 25 Hydroxy  -     Vitamin B12 & Folate  -     Urinalysis With Microscopic - Urine, Clean Catch  -     Urine Culture - Urine, Urine, Clean Catch  -     CBC (No Diff)    5. Dysuria  -     CBC (No Diff); Future  -      Comprehensive Metabolic Panel  -     Lipid Panel  -     Vitamin D 25 Hydroxy  -     Vitamin B12 & Folate  -     Urinalysis With Microscopic - Urine, Clean Catch  -     Urine Culture - Urine, Urine, Clean Catch  -     CBC (No Diff)        An After Visit Summary and PPPS with all of these plans were given to the patient.      Follow Up:  Return in about 6 months (around 10/6/2021), or if symptoms worsen or fail to improve, for Recheck.

## 2021-04-07 LAB — BACTERIA SPEC AEROBE CULT: NO GROWTH

## 2021-04-08 ENCOUNTER — HOSPITAL ENCOUNTER (OUTPATIENT)
Dept: CARDIOLOGY | Facility: HOSPITAL | Age: 56
Discharge: HOME | End: 2021-04-08

## 2021-04-08 ENCOUNTER — HOSPITAL ENCOUNTER (OUTPATIENT)
Dept: CARDIOLOGY | Facility: HOSPITAL | Age: 56
Discharge: HOME | End: 2021-04-08
Admitting: INTERNAL MEDICINE

## 2021-04-08 VITALS
SYSTOLIC BLOOD PRESSURE: 139 MMHG | HEART RATE: 57 BPM | BODY MASS INDEX: 31.16 KG/M2 | DIASTOLIC BLOOD PRESSURE: 81 MMHG | WEIGHT: 187 LBS | HEIGHT: 65 IN

## 2021-04-08 VITALS
RESPIRATION RATE: 16 BRPM | SYSTOLIC BLOOD PRESSURE: 128 MMHG | HEART RATE: 55 BPM | OXYGEN SATURATION: 100 % | DIASTOLIC BLOOD PRESSURE: 80 MMHG

## 2021-04-08 DIAGNOSIS — R07.89 CHEST PAIN, ATYPICAL: ICD-10-CM

## 2021-04-08 DIAGNOSIS — I10 ESSENTIAL HYPERTENSION: ICD-10-CM

## 2021-04-08 DIAGNOSIS — R00.2 PALPITATIONS: ICD-10-CM

## 2021-04-08 DIAGNOSIS — R06.02 SOB (SHORTNESS OF BREATH): ICD-10-CM

## 2021-04-08 DIAGNOSIS — E78.00 PURE HYPERCHOLESTEROLEMIA: ICD-10-CM

## 2021-04-08 PROCEDURE — 0 TECHNETIUM SESTAMIBI: Performed by: INTERNAL MEDICINE

## 2021-04-08 PROCEDURE — 93018 CV STRESS TEST I&R ONLY: CPT | Performed by: INTERNAL MEDICINE

## 2021-04-08 PROCEDURE — 25010000002 REGADENOSON 0.4 MG/5ML SOLUTION: Performed by: INTERNAL MEDICINE

## 2021-04-08 PROCEDURE — 78452 HT MUSCLE IMAGE SPECT MULT: CPT

## 2021-04-08 PROCEDURE — 93306 TTE W/DOPPLER COMPLETE: CPT

## 2021-04-08 PROCEDURE — 93016 CV STRESS TEST SUPVJ ONLY: CPT | Performed by: INTERNAL MEDICINE

## 2021-04-08 PROCEDURE — A9500 TC99M SESTAMIBI: HCPCS | Performed by: INTERNAL MEDICINE

## 2021-04-08 PROCEDURE — 93306 TTE W/DOPPLER COMPLETE: CPT | Performed by: INTERNAL MEDICINE

## 2021-04-08 PROCEDURE — 93017 CV STRESS TEST TRACING ONLY: CPT

## 2021-04-08 PROCEDURE — 78452 HT MUSCLE IMAGE SPECT MULT: CPT | Performed by: INTERNAL MEDICINE

## 2021-04-08 PROCEDURE — 25010000002 PERFLUTREN (DEFINITY) 8.476 MG IN SODIUM CHLORIDE (PF) 0.9 % 10 ML INJECTION: Performed by: INTERNAL MEDICINE

## 2021-04-08 RX ADMIN — REGADENOSON 0.4 MG: 0.08 INJECTION, SOLUTION INTRAVENOUS at 12:17

## 2021-04-08 RX ADMIN — SODIUM CHLORIDE 3 ML: 9 INJECTION INTRAMUSCULAR; INTRAVENOUS; SUBCUTANEOUS at 10:42

## 2021-04-08 RX ADMIN — TECHNETIUM TC 99M SESTAMIBI 1 DOSE: 1 INJECTION INTRAVENOUS at 08:21

## 2021-04-08 RX ADMIN — TECHNETIUM TC 99M SESTAMIBI 1 DOSE: 1 INJECTION INTRAVENOUS at 12:17

## 2021-04-09 LAB
AORTIC ARCH: 2.5 CM
AORTIC DIMENSIONLESS INDEX: 0.8 (DI)
ASCENDING AORTA: 2.7 CM
BH CV ECHO MEAS - ACS: 2.3 CM
BH CV ECHO MEAS - AO ARCH DIAM (PROXIMAL TRANS.): 2.5 CM
BH CV ECHO MEAS - AO MAX PG (FULL): 2.5 MMHG
BH CV ECHO MEAS - AO MAX PG: 7.1 MMHG
BH CV ECHO MEAS - AO MEAN PG (FULL): 0.93 MMHG
BH CV ECHO MEAS - AO MEAN PG: 3.5 MMHG
BH CV ECHO MEAS - AO ROOT AREA (BSA CORRECTED): 1.5
BH CV ECHO MEAS - AO ROOT AREA: 6.9 CM^2
BH CV ECHO MEAS - AO ROOT DIAM: 3 CM
BH CV ECHO MEAS - AO V2 MAX: 132.8 CM/SEC
BH CV ECHO MEAS - AO V2 MEAN: 87.2 CM/SEC
BH CV ECHO MEAS - AO V2 VTI: 31.6 CM
BH CV ECHO MEAS - ASC AORTA: 2.7 CM
BH CV ECHO MEAS - AVA(I,A): 2.5 CM^2
BH CV ECHO MEAS - AVA(I,D): 2.5 CM^2
BH CV ECHO MEAS - AVA(V,A): 2.4 CM^2
BH CV ECHO MEAS - AVA(V,D): 2.4 CM^2
BH CV ECHO MEAS - BSA(HAYCOCK): 2 M^2
BH CV ECHO MEAS - BSA: 1.9 M^2
BH CV ECHO MEAS - BZI_BMI: 31.1 KILOGRAMS/M^2
BH CV ECHO MEAS - BZI_METRIC_HEIGHT: 165.1 CM
BH CV ECHO MEAS - BZI_METRIC_WEIGHT: 84.8 KG
BH CV ECHO MEAS - EDV(CUBED): 64.1 ML
BH CV ECHO MEAS - EDV(MOD-SP2): 115 ML
BH CV ECHO MEAS - EDV(MOD-SP4): 123 ML
BH CV ECHO MEAS - EDV(TEICH): 70 ML
BH CV ECHO MEAS - EF(CUBED): 79 %
BH CV ECHO MEAS - EF(MOD-BP): 64.5 %
BH CV ECHO MEAS - EF(MOD-SP2): 65.2 %
BH CV ECHO MEAS - EF(MOD-SP4): 65.9 %
BH CV ECHO MEAS - EF(TEICH): 71.9 %
BH CV ECHO MEAS - ESV(CUBED): 13.4 ML
BH CV ECHO MEAS - ESV(MOD-SP2): 40 ML
BH CV ECHO MEAS - ESV(MOD-SP4): 42 ML
BH CV ECHO MEAS - ESV(TEICH): 19.7 ML
BH CV ECHO MEAS - FS: 40.6 %
BH CV ECHO MEAS - IVS/LVPW: 0.81
BH CV ECHO MEAS - IVSD: 0.97 CM
BH CV ECHO MEAS - LA DIMENSION: 3.8 CM
BH CV ECHO MEAS - LA/AO: 1.3
BH CV ECHO MEAS - LAT PEAK E' VEL: 12 CM/SEC
BH CV ECHO MEAS - LV DIASTOLIC VOL/BSA (35-75): 64 ML/M^2
BH CV ECHO MEAS - LV MASS(C)D: 143.9 GRAMS
BH CV ECHO MEAS - LV MASS(C)DI: 74.9 GRAMS/M^2
BH CV ECHO MEAS - LV MAX PG: 4.5 MMHG
BH CV ECHO MEAS - LV MEAN PG: 2.6 MMHG
BH CV ECHO MEAS - LV SYSTOLIC VOL/BSA (12-30): 21.8 ML/M^2
BH CV ECHO MEAS - LV V1 MAX: 106.2 CM/SEC
BH CV ECHO MEAS - LV V1 MEAN: 76.4 CM/SEC
BH CV ECHO MEAS - LV V1 VTI: 26.4 CM
BH CV ECHO MEAS - LVIDD: 4 CM
BH CV ECHO MEAS - LVIDS: 2.4 CM
BH CV ECHO MEAS - LVLD AP2: 8 CM
BH CV ECHO MEAS - LVLD AP4: 8 CM
BH CV ECHO MEAS - LVLS AP2: 6.5 CM
BH CV ECHO MEAS - LVLS AP4: 6 CM
BH CV ECHO MEAS - LVOT AREA (M): 2.8 CM^2
BH CV ECHO MEAS - LVOT AREA: 3 CM^2
BH CV ECHO MEAS - LVOT DIAM: 1.9 CM
BH CV ECHO MEAS - LVPWD: 1.2 CM
BH CV ECHO MEAS - MED PEAK E' VEL: 8.3 CM/SEC
BH CV ECHO MEAS - MV A DUR: 0.19 SEC
BH CV ECHO MEAS - MV A MAX VEL: 89.2 CM/SEC
BH CV ECHO MEAS - MV DEC SLOPE: 197.1 CM/SEC^2
BH CV ECHO MEAS - MV DEC TIME: 0.16 SEC
BH CV ECHO MEAS - MV E MAX VEL: 94.1 CM/SEC
BH CV ECHO MEAS - MV E/A: 1.1
BH CV ECHO MEAS - MV MAX PG: 3.2 MMHG
BH CV ECHO MEAS - MV MEAN PG: 1.6 MMHG
BH CV ECHO MEAS - MV P1/2T MAX VEL: 89.6 CM/SEC
BH CV ECHO MEAS - MV P1/2T: 133.1 MSEC
BH CV ECHO MEAS - MV V2 MAX: 89.6 CM/SEC
BH CV ECHO MEAS - MV V2 MEAN: 59.8 CM/SEC
BH CV ECHO MEAS - MV V2 VTI: 33.8 CM
BH CV ECHO MEAS - MVA P1/2T LCG: 2.5 CM^2
BH CV ECHO MEAS - MVA(P1/2T): 1.7 CM^2
BH CV ECHO MEAS - MVA(VTI): 2.3 CM^2
BH CV ECHO MEAS - PA ACC TIME: 0.09 SEC
BH CV ECHO MEAS - PA MAX PG (FULL): 2.9 MMHG
BH CV ECHO MEAS - PA MAX PG: 4.1 MMHG
BH CV ECHO MEAS - PA PR(ACCEL): 39.8 MMHG
BH CV ECHO MEAS - PA V2 MAX: 101.8 CM/SEC
BH CV ECHO MEAS - PULM A REVS DUR: 0.17 SEC
BH CV ECHO MEAS - PULM A REVS VEL: 30.1 CM/SEC
BH CV ECHO MEAS - PULM DIAS VEL: 38.8 CM/SEC
BH CV ECHO MEAS - PULM S/D: 1.7
BH CV ECHO MEAS - PULM SYS VEL: 66.5 CM/SEC
BH CV ECHO MEAS - PVA(V,A): 2.6 CM^2
BH CV ECHO MEAS - PVA(V,D): 2.6 CM^2
BH CV ECHO MEAS - QP/QS: 0.88
BH CV ECHO MEAS - RAP SYSTOLE: 3 MMHG
BH CV ECHO MEAS - RV MAX PG: 1.2 MMHG
BH CV ECHO MEAS - RV MEAN PG: 0.67 MMHG
BH CV ECHO MEAS - RV V1 MAX: 54.8 CM/SEC
BH CV ECHO MEAS - RV V1 MEAN: 38.3 CM/SEC
BH CV ECHO MEAS - RV V1 VTI: 14.4 CM
BH CV ECHO MEAS - RVOT AREA: 4.8 CM^2
BH CV ECHO MEAS - RVOT DIAM: 2.5 CM
BH CV ECHO MEAS - RVSP: 27.7 MMHG
BH CV ECHO MEAS - SI(AO): 113.8 ML/M^2
BH CV ECHO MEAS - SI(CUBED): 26.3 ML/M^2
BH CV ECHO MEAS - SI(LVOT): 40.6 ML/M^2
BH CV ECHO MEAS - SI(MOD-SP2): 39 ML/M^2
BH CV ECHO MEAS - SI(MOD-SP4): 42.1 ML/M^2
BH CV ECHO MEAS - SI(TEICH): 26.2 ML/M^2
BH CV ECHO MEAS - SUP REN AO DIAM: 1.6 CM
BH CV ECHO MEAS - SV(AO): 218.7 ML
BH CV ECHO MEAS - SV(CUBED): 50.6 ML
BH CV ECHO MEAS - SV(LVOT): 78.1 ML
BH CV ECHO MEAS - SV(MOD-SP2): 75 ML
BH CV ECHO MEAS - SV(MOD-SP4): 81 ML
BH CV ECHO MEAS - SV(RVOT): 68.7 ML
BH CV ECHO MEAS - SV(TEICH): 50.4 ML
BH CV ECHO MEAS - TR MAX VEL: 248.4 CM/SEC
BH CV ECHO MEASUREMENTS AVERAGE E/E' RATIO: 9.27
BH CV XLRA - RV BASE: 2.7 CM
BH CV XLRA - RV LENGTH: 7.3 CM
BH CV XLRA - RV MID: 1.9 CM
BH CV XLRA - TDI S': 14.5 CM/SEC
LEFT ATRIUM VOLUME INDEX: 17 ML/M2
MAXIMAL PREDICTED HEART RATE: 165 BPM
SINUS: 2.9 CM
STJ: 2.2 CM
STRESS TARGET HR: 140 BPM

## 2021-04-12 ENCOUNTER — TELEPHONE (OUTPATIENT)
Dept: FAMILY MEDICINE CLINIC | Facility: CLINIC | Age: 56
End: 2021-04-12

## 2021-04-12 DIAGNOSIS — E04.9 THYROID ENLARGEMENT: Primary | ICD-10-CM

## 2021-04-12 LAB
BH CV REST NUCLEAR ISOTOPE DOSE: 10.9 MCI
BH CV STRESS BP STAGE 1: NORMAL
BH CV STRESS COMMENTS STAGE 1: NORMAL
BH CV STRESS DOSE REGADENOSON STAGE 1: 0.4
BH CV STRESS DURATION MIN STAGE 1: 2
BH CV STRESS DURATION SEC STAGE 1: 1
BH CV STRESS GRADE STAGE 1: 0
BH CV STRESS HR STAGE 1: 98
BH CV STRESS METS STAGE 1: 1.8
BH CV STRESS NUCLEAR ISOTOPE DOSE: 30.5 MCI
BH CV STRESS PROTOCOL 1: NORMAL
BH CV STRESS RECOVERY BP: NORMAL MMHG
BH CV STRESS RECOVERY HR: 73 BPM
BH CV STRESS RECOVERY O2: 100 %
BH CV STRESS SPEED STAGE 1: 1.3
BH CV STRESS STAGE 1: 1
LV EF NUC BP: 70 %
MAXIMAL PREDICTED HEART RATE: 165 BPM
PERCENT MAX PREDICTED HR: 59.39 %
STRESS BASELINE BP: NORMAL MMHG
STRESS BASELINE HR: 62 BPM
STRESS O2 SAT REST: 100 %
STRESS PERCENT HR: 70 %
STRESS POST ESTIMATED WORKLOAD: 1.8 METS
STRESS POST EXERCISE DUR MIN: 2 MIN
STRESS POST EXERCISE DUR SEC: 1 SEC
STRESS POST PEAK BP: NORMAL MMHG
STRESS POST PEAK HR: 98 BPM
STRESS TARGET HR: 140 BPM

## 2021-04-12 NOTE — TELEPHONE ENCOUNTER
Caller: Braydon Soria    Relationship: Self    Best call back number:[phone]    Caller requesting test results: LAB    What test was performed: LABS    When was the test performed: 4/6    Where was the test performed: OFFICE

## 2021-04-15 ENCOUNTER — OFFICE VISIT (OUTPATIENT)
Dept: CARDIOLOGY | Facility: CLINIC | Age: 56
End: 2021-04-15

## 2021-04-15 VITALS
HEIGHT: 65 IN | BODY MASS INDEX: 30.32 KG/M2 | WEIGHT: 182 LBS | SYSTOLIC BLOOD PRESSURE: 158 MMHG | DIASTOLIC BLOOD PRESSURE: 89 MMHG | HEART RATE: 64 BPM

## 2021-04-15 DIAGNOSIS — E78.00 PURE HYPERCHOLESTEROLEMIA: ICD-10-CM

## 2021-04-15 DIAGNOSIS — R93.1 ABNORMAL ECHOCARDIOGRAM: Primary | ICD-10-CM

## 2021-04-15 DIAGNOSIS — R00.2 PALPITATIONS: ICD-10-CM

## 2021-04-15 DIAGNOSIS — I10 ESSENTIAL HYPERTENSION: ICD-10-CM

## 2021-04-15 PROCEDURE — 99214 OFFICE O/P EST MOD 30 MIN: CPT | Performed by: INTERNAL MEDICINE

## 2021-04-15 RX ORDER — ROSUVASTATIN CALCIUM 5 MG/1
5 TABLET, COATED ORAL DAILY
Qty: 30 TABLET | Refills: 11 | Status: SHIPPED | OUTPATIENT
Start: 2021-04-15 | End: 2021-06-10 | Stop reason: SDUPTHER

## 2021-04-15 NOTE — PROGRESS NOTES
STRESS/ECHO RESULTS    Subjective:        Braydon Soria is a 55 y.o. female who here for follow up    CC  MUCH BETTER WITH ZIAC  HPI  55-year-old female with palpitation hypertension here for the follow-up after starting the Ziac patient feels much better with no side effects     Problems Addressed this Visit        Cardiac and Vasculature    Essential hypertension    Relevant Orders    Lipid Panel    Comprehensive Metabolic Panel    Palpitations    Abnormal echocardiogram - Primary    Hyperlipidemia    Relevant Medications    rosuvastatin (CRESTOR) 5 MG tablet      Diagnoses       Codes Comments    Abnormal echocardiogram    -  Primary ICD-10-CM: R93.1  ICD-9-CM: 793.2     Essential hypertension     ICD-10-CM: I10  ICD-9-CM: 401.9     Palpitations     ICD-10-CM: R00.2  ICD-9-CM: 785.1     Pure hypercholesterolemia     ICD-10-CM: E78.00  ICD-9-CM: 272.0         .Interpretation Summary       · Findings consistent with a normal ECG stress test.  · Left ventricular ejection fraction is normal. (Calculated EF = 70%).  · Myocardial perfusion imaging indicates a normal myocardial perfusion study with no evidence of ischemia.  · Impressions are consistent with a low risk study     Interpretation Summary    · Calculated left ventricular EF = 64.5% Estimated left ventricular EF was in agreement with the calculated left ventricular EF.  · Left ventricular diastolic function was normal.  · There is no evidence of pericardial effusion.            The following portions of the patient's history were reviewed and updated as appropriate: allergies, current medications, past family history, past medical history, past social history, past surgical history and problem list.    Past Medical History:   Diagnosis Date   • Anxiety    • Arthritis    • Chest pressure    • Depression    • Dyspnea    • Gallbladder disease     gallstones   • Hormone replacement therapy (HRT)    • Hypercholesterolemia    • Hyperlipidemia    • Hypertension   "  • Insomnia    • Low back pain    • Lumbosacral disc disease    • Migraines    • Peripheral neuropathy    • Rheumatoid arthritis (CMS/HCC)    • SOB (shortness of breath)    • Thyroid nodule      reports that she has quit smoking. She has never used smokeless tobacco. She reports that she does not drink alcohol and does not use drugs.   Family History   Problem Relation Age of Onset   • Stroke Mother    • Migraines Mother    • Heart disease Father    • Heart attack Father    • Heart disease Brother    • Heart attack Brother    • Stroke Paternal Grandmother        Review of Systems  Constitutional: No wt loss, fever, fatigue  Gastrointestinal: No nausea, abdominal pain  Behavioral/Psych: No insomnia or anxiety   Cardiovascular no chest pain  Objective:       Physical Exam  /89   Pulse 64   Ht 165.1 cm (65\")   Wt 82.6 kg (182 lb)   LMP 01/20/2015 Comment: PERIMENOPAUSAL.  BMI 30.29 kg/m²   General appearance: No acute changes   Neck: Trachea midline; NECK, supple, no thyromegaly or lymphadenopathy   Lungs: Normal size and shape, normal breath sounds, equal distribution of air, no rales and rhonchi   CV: S1-S2 regular, no murmurs, no rub, no gallop   Abdomen: Soft, non-tender; no masses , no abnormal abdominal sounds   Extremities: No deformity , normal color , no peripheral edema   Skin: Normal temperature, turgor and texture; no rash, ulcers          Procedures      Echocardiogram:        Current Outpatient Medications:   •  amLODIPine (NORVASC) 5 MG tablet, Take 1 tablet by mouth Daily., Disp: 90 tablet, Rfl: 1  •  bisoprolol-hydrochlorothiazide (Ziac) 10-6.25 MG per tablet, Take 1 tablet by mouth Daily., Disp: 30 tablet, Rfl: 6  •  citalopram (CeleXA) 40 MG tablet, Take 40 mg by mouth Daily., Disp: , Rfl:   •  vitamin B-12 (CYANOCOBALAMIN) 100 MCG tablet, Take 50 mcg by mouth Daily., Disp: , Rfl:   •  vitamin D3 125 MCG (5000 UT) capsule capsule, Take 5,000 Units by mouth Daily., Disp: , Rfl:   •  " fluticasone (FLONASE) 50 MCG/ACT nasal spray, 2 sprays into the nostril(s) as directed by provider Daily., Disp: 1 bottle, Rfl: 12   Assessment:        Patient Active Problem List   Diagnosis   • Midline low back pain with left-sided sciatica   • Hyperreflexia   • Incomplete bladder emptying   • Thyroid enlargement   • Essential hypertension   • Chronic bilateral low back pain with bilateral sciatica   • DDD (degenerative disc disease), lumbar   • Lumbar spondylolysis   • Atrophy of muscle of lower leg   • Arthritis of both hands   • Patellar tendonitis of both knees   • Chondromalacia of patella   • Palpitations   • Abnormal echocardiogram   • Degeneration of intervertebral disc   • Vitamin D deficiency   • Seasonal allergies   • Posttraumatic stress disorder   • Hyperlipidemia   • Depressive disorder   • Chronic back pain   • Body mass index (BMI) of 25.0 to 29.9   • Anxiety   • Rheumatoid arteritis (CMS/HCC)   • SOB (shortness of breath)     Component   Ref Range & Units 9 d ago   Total Cholesterol   0 - 200 mg/dL 287High     Triglycerides   0 - 150 mg/dL 234High     HDL Cholesterol   40 - 60 mg/dL 54    LDL Cholesterol    0 - 100 mg/dL 188High     VLDL Cholesterol   5 - 40 mg/dL 45High     LDL/HDL Ratio  3.45                  Plan:            ICD-10-CM ICD-9-CM   1. Abnormal echocardiogram  R93.1 793.2   2. Essential hypertension  I10 401.9   3. Palpitations  R00.2 785.1   4. Pure hypercholesterolemia  E78.00 272.0     1. Abnormal echocardiogram  Stable    2. Essential hypertension  Much better  - Lipid Panel; Future  - Comprehensive Metabolic Panel; Future    3. Palpitations  Under control better with Ziac    4. Pure hypercholesterolemia  Start Crestor       Specificity and sensitivity of the stress test/ cardiac workup has been explained. Pt has been explained if  Symptoms continue please go to ER, and further w/p will be required.    Also explained this does not rule out coronary artery disease or the future  events, continue to emphasize on risk reductions for coronary artery disease    Pt also advised to contact PCP for other causes of symptoms    START CRESTOR 5 MG     Pros and cons of this new medication / change medication has been explained to  the patient    Possible side effects has been explained    Associated need of the blood  Work has been explained    Need for the compliance of the medication has been explained    SEE IN 6 WKS WITH LABS    COUNSELING:    Braydon Titus was given to patient for the following topics: diagnostic results, risk factor reductions, impressions, risks and benefits of treatment options and importance of treatment compliance .       SMOKING COUNSELING:    [unfilled]    Dictated using Dragon dictation

## 2021-05-07 ENCOUNTER — APPOINTMENT (OUTPATIENT)
Dept: CARDIOLOGY | Facility: HOSPITAL | Age: 56
End: 2021-05-07

## 2021-05-17 ENCOUNTER — TELEPHONE (OUTPATIENT)
Dept: FAMILY MEDICINE CLINIC | Facility: CLINIC | Age: 56
End: 2021-05-17

## 2021-05-17 DIAGNOSIS — M54.40 BILATERAL LOW BACK PAIN WITH SCIATICA, SCIATICA LATERALITY UNSPECIFIED, UNSPECIFIED CHRONICITY: Primary | ICD-10-CM

## 2021-05-17 NOTE — TELEPHONE ENCOUNTER
PATIENT'S SON PARADISE CALLED TO REQUEST DR HILL TO GET A REFERRAL WITH BACK SPECIALIST DR RENY INFANTE.    PLEASE ADVISE    [phone]

## 2021-05-28 ENCOUNTER — HOSPITAL ENCOUNTER (OUTPATIENT)
Dept: CARDIOLOGY | Facility: HOSPITAL | Age: 56
Discharge: HOME | End: 2021-05-28
Admitting: INTERNAL MEDICINE

## 2021-05-28 DIAGNOSIS — I10 ESSENTIAL HYPERTENSION: ICD-10-CM

## 2021-05-28 LAB
ALBUMIN SERPL-MCNC: 4.5 G/DL (ref 3.5–5.2)
ALBUMIN/GLOB SERPL: 2 G/DL
ALP SERPL-CCNC: 62 U/L (ref 39–117)
ALT SERPL W P-5'-P-CCNC: 14 U/L (ref 1–33)
ANION GAP SERPL CALCULATED.3IONS-SCNC: 9.4 MMOL/L (ref 5–15)
AST SERPL-CCNC: 18 U/L (ref 1–32)
BILIRUB SERPL-MCNC: 0.3 MG/DL (ref 0–1.2)
BUN SERPL-MCNC: 13 MG/DL (ref 6–20)
BUN/CREAT SERPL: 20 (ref 7–25)
CALCIUM SPEC-SCNC: 9.5 MG/DL (ref 8.6–10.5)
CHLORIDE SERPL-SCNC: 101 MMOL/L (ref 98–107)
CHOLEST SERPL-MCNC: 154 MG/DL (ref 0–200)
CO2 SERPL-SCNC: 27.6 MMOL/L (ref 22–29)
CREAT SERPL-MCNC: 0.65 MG/DL (ref 0.57–1)
GFR SERPL CREATININE-BSD FRML MDRD: 95 ML/MIN/1.73
GLOBULIN UR ELPH-MCNC: 2.3 GM/DL
GLUCOSE SERPL-MCNC: 98 MG/DL (ref 65–99)
HDLC SERPL-MCNC: 52 MG/DL (ref 40–60)
LDLC SERPL CALC-MCNC: 77 MG/DL (ref 0–100)
LDLC/HDLC SERPL: 1.42 {RATIO}
POTASSIUM SERPL-SCNC: 4.4 MMOL/L (ref 3.5–5.2)
PROT SERPL-MCNC: 6.8 G/DL (ref 6–8.5)
SODIUM SERPL-SCNC: 138 MMOL/L (ref 136–145)
TRIGL SERPL-MCNC: 141 MG/DL (ref 0–150)
VLDLC SERPL-MCNC: 25 MG/DL (ref 5–40)

## 2021-05-28 PROCEDURE — 36415 COLL VENOUS BLD VENIPUNCTURE: CPT | Performed by: INTERNAL MEDICINE

## 2021-05-28 PROCEDURE — 80061 LIPID PANEL: CPT | Performed by: INTERNAL MEDICINE

## 2021-05-28 PROCEDURE — 80053 COMPREHEN METABOLIC PANEL: CPT | Performed by: INTERNAL MEDICINE

## 2021-06-10 ENCOUNTER — OFFICE VISIT (OUTPATIENT)
Dept: CARDIOLOGY | Facility: CLINIC | Age: 56
End: 2021-06-10

## 2021-06-10 VITALS
DIASTOLIC BLOOD PRESSURE: 85 MMHG | WEIGHT: 186 LBS | BODY MASS INDEX: 30.99 KG/M2 | HEART RATE: 60 BPM | HEIGHT: 65 IN | SYSTOLIC BLOOD PRESSURE: 135 MMHG

## 2021-06-10 DIAGNOSIS — R00.2 PALPITATIONS: Primary | ICD-10-CM

## 2021-06-10 DIAGNOSIS — R06.02 SOB (SHORTNESS OF BREATH): ICD-10-CM

## 2021-06-10 DIAGNOSIS — E78.00 PURE HYPERCHOLESTEROLEMIA: ICD-10-CM

## 2021-06-10 DIAGNOSIS — I10 ESSENTIAL HYPERTENSION: ICD-10-CM

## 2021-06-10 PROCEDURE — 99213 OFFICE O/P EST LOW 20 MIN: CPT | Performed by: INTERNAL MEDICINE

## 2021-06-10 RX ORDER — ROSUVASTATIN CALCIUM 5 MG/1
5 TABLET, COATED ORAL DAILY
Qty: 90 TABLET | Refills: 1 | Status: SHIPPED | OUTPATIENT
Start: 2021-06-10 | End: 2022-07-08

## 2021-06-10 RX ORDER — BISOPROLOL FUMARATE AND HYDROCHLOROTHIAZIDE 10; 6.25 MG/1; MG/1
1 TABLET ORAL DAILY
Qty: 90 TABLET | Refills: 1 | Status: SHIPPED | OUTPATIENT
Start: 2021-06-10 | End: 2022-04-11

## 2021-06-10 RX ORDER — AMLODIPINE BESYLATE 5 MG/1
5 TABLET ORAL DAILY
Qty: 90 TABLET | Refills: 1 | Status: SHIPPED | OUTPATIENT
Start: 2021-06-10 | End: 2021-12-14

## 2021-06-10 NOTE — PROGRESS NOTES
6 wk with lab results    Subjective:        Braydon Soria is a 55 y.o. female who here for follow up    CC  Follow-up hypertension shortness of breath  HPI  55-year-old female with benign essential arterial hypertension, palpitations and hyperlipidemia shortness of breath here for the follow-up with no complaints of chest pains tightness heaviness or the pressure sensation     Problems Addressed this Visit        Cardiac and Vasculature    Essential hypertension    Palpitations - Primary    Hyperlipidemia       Pulmonary and Pneumonias    SOB (shortness of breath)      Diagnoses       Codes Comments    Palpitations    -  Primary ICD-10-CM: R00.2  ICD-9-CM: 785.1     Pure hypercholesterolemia     ICD-10-CM: E78.00  ICD-9-CM: 272.0     Essential hypertension     ICD-10-CM: I10  ICD-9-CM: 401.9     SOB (shortness of breath)     ICD-10-CM: R06.02  ICD-9-CM: 786.05         .    The following portions of the patient's history were reviewed and updated as appropriate: allergies, current medications, past family history, past medical history, past social history, past surgical history and problem list.    Past Medical History:   Diagnosis Date   • Anxiety    • Arthritis    • Chest pressure    • Depression    • Dyspnea    • Gallbladder disease     gallstones   • Hormone replacement therapy (HRT)    • Hypercholesterolemia    • Hyperlipidemia    • Hypertension    • Insomnia    • Low back pain    • Lumbosacral disc disease    • Migraines    • Peripheral neuropathy    • Rheumatoid arthritis (CMS/HCC)    • SOB (shortness of breath)    • Thyroid nodule      reports that she has quit smoking. She has never used smokeless tobacco. She reports that she does not drink alcohol and does not use drugs.   Family History   Problem Relation Age of Onset   • Stroke Mother    • Migraines Mother    • Heart disease Father    • Heart attack Father    • Heart disease Brother    • Heart attack Brother    • Stroke Paternal Grandmother   "      Review of Systems  Constitutional: No wt loss, fever, fatigue  Gastrointestinal: No nausea, abdominal pain  Behavioral/Psych: No insomnia or anxiety   Cardiovascular shortness of breath  Objective:       Physical Exam  /85   Pulse 60   Ht 165.1 cm (65\")   Wt 84.4 kg (186 lb)   LMP 01/20/2015 Comment: PERIMENOPAUSAL.  BMI 30.95 kg/m²   General appearance: No acute changes   Neck: Trachea midline; NECK, supple, no thyromegaly or lymphadenopathy   Lungs: Normal size and shape, normal breath sounds, equal distribution of air, no rales and rhonchi   CV: S1-S2 regular, no murmurs, no rub, no gallop   Abdomen: Soft, non-tender; no masses , no abnormal abdominal sounds   Extremities: No deformity , normal color , no peripheral edema   Skin: Normal temperature, turgor and texture; no rash, ulcers          Procedures      Echocardiogram:        Current Outpatient Medications:   •  amLODIPine (NORVASC) 5 MG tablet, Take 1 tablet by mouth Daily., Disp: 90 tablet, Rfl: 1  •  bisoprolol-hydrochlorothiazide (Ziac) 10-6.25 MG per tablet, Take 1 tablet by mouth Daily., Disp: 30 tablet, Rfl: 6  •  citalopram (CeleXA) 40 MG tablet, Take 40 mg by mouth Daily., Disp: , Rfl:   •  rosuvastatin (CRESTOR) 5 MG tablet, Take 1 tablet by mouth Daily., Disp: 30 tablet, Rfl: 11  •  vitamin B-12 (CYANOCOBALAMIN) 100 MCG tablet, Take 50 mcg by mouth Daily., Disp: , Rfl:   •  vitamin D3 125 MCG (5000 UT) capsule capsule, Take 5,000 Units by mouth Daily., Disp: , Rfl:   •  fluticasone (FLONASE) 50 MCG/ACT nasal spray, 2 sprays into the nostril(s) as directed by provider Daily., Disp: 1 bottle, Rfl: 12   Assessment:        Patient Active Problem List   Diagnosis   • Midline low back pain with left-sided sciatica   • Hyperreflexia   • Incomplete bladder emptying   • Thyroid enlargement   • Essential hypertension   • Chronic bilateral low back pain with bilateral sciatica   • DDD (degenerative disc disease), lumbar   • Lumbar " spondylolysis   • Atrophy of muscle of lower leg   • Arthritis of both hands   • Patellar tendonitis of both knees   • Chondromalacia of patella   • Palpitations   • Abnormal echocardiogram   • Degeneration of intervertebral disc   • Vitamin D deficiency   • Seasonal allergies   • Posttraumatic stress disorder   • Hyperlipidemia   • Depressive disorder   • Chronic back pain   • Body mass index (BMI) of 25.0 to 29.9   • Anxiety   • Rheumatoid arteritis (CMS/HCC)   • SOB (shortness of breath)     Component   Ref Range & Units 13 d ago   Total Cholesterol   0 - 200 mg/dL 154    Triglycerides   0 - 150 mg/dL 141    HDL Cholesterol   40 - 60 mg/dL 52    LDL Cholesterol    0 - 100 mg/dL 77    VLDL Cholesterol   5 - 40 mg/dL 25    LDL/HDL Ratio  1.42      Units 13 d ago   Glucose   65 - 99 mg/dL 98    BUN   6 - 20 mg/dL 13    Creatinine   0.57 - 1.00 mg/dL 0.65    Sodium   136 - 145 mmol/L 138    Potassium   3.5 - 5.2 mmol/L 4.4    Chloride   98 - 107 mmol/L 101    CO2   22.0 - 29.0 mmol/L 27.6    Calcium   8.6 - 10.5 mg/dL 9.5    Total Protein   6.0 - 8.5 g/dL 6.8    Albumin   3.50 - 5.20 g/dL 4.50    ALT (SGPT)   1 - 33 U/L 14    AST (SGOT)   1 - 32 U/L 18    Alkaline Phosphatase   39 - 117 U/L 62    Total Bilirubin   0.0 - 1.2 mg/dL 0.3    eGFR Non African Amer   >60 mL/min/1.73 95                Plan:            ICD-10-CM ICD-9-CM   1. Palpitations  R00.2 785.1   2. Pure hypercholesterolemia  E78.00 272.0   3. Essential hypertension  I10 401.9   4. SOB (shortness of breath)  R06.02 786.05     1. Palpitations  Palpitations under control    2. Pure hypercholesterolemia  Continue current treatment    3. Essential hypertension  Blood pressure under control    4. SOB (shortness of breath)  Multifactorial     Labs much better    See in 1 yr  COUNSELING:    Braydon Titus was given to patient for the following topics: diagnostic results, risk factor reductions, impressions, risks and benefits of treatment options  and importance of treatment compliance .       SMOKING COUNSELING:    [unfilled]    Dictated using Dragon dictation

## 2021-09-24 NOTE — PATIENT INSTRUCTIONS
Start zpack, take as directed.   flonase 2 sprays each nostril daily as needed for drainage,   mucinex 2x day as needed for cough or congestion   Tessalon perles may take 3x day as needed for cough.   If symptoms persist 5-7 days call office.   Do not put anything in ears  Increase fluid intake, get plenty of rest.   Patient agrees with plan of care and understands instructions. Call if worsening symptoms or any problems or concerns.      No

## 2021-12-14 RX ORDER — AMLODIPINE BESYLATE 5 MG/1
TABLET ORAL
Qty: 90 TABLET | Refills: 0 | Status: SHIPPED | OUTPATIENT
Start: 2021-12-14 | End: 2022-03-28

## 2021-12-20 ENCOUNTER — TRANSCRIBE ORDERS (OUTPATIENT)
Dept: ADMINISTRATIVE | Facility: HOSPITAL | Age: 56
End: 2021-12-20

## 2021-12-20 DIAGNOSIS — Z12.39 SCREENING BREAST EXAMINATION: Primary | ICD-10-CM

## 2022-03-18 ENCOUNTER — HOSPITAL ENCOUNTER (OUTPATIENT)
Dept: MAMMOGRAPHY | Facility: HOSPITAL | Age: 57
Discharge: HOME | End: 2022-03-18
Admitting: INTERNAL MEDICINE

## 2022-03-18 DIAGNOSIS — Z12.39 SCREENING BREAST EXAMINATION: ICD-10-CM

## 2022-03-18 PROCEDURE — 77063 BREAST TOMOSYNTHESIS BI: CPT

## 2022-03-18 PROCEDURE — 77067 SCR MAMMO BI INCL CAD: CPT

## 2022-03-28 RX ORDER — AMLODIPINE BESYLATE 5 MG/1
TABLET ORAL
Qty: 90 TABLET | Refills: 1 | Status: SHIPPED | OUTPATIENT
Start: 2022-03-28 | End: 2022-09-22

## 2022-04-09 RX ORDER — BISOPROLOL FUMARATE AND HYDROCHLOROTHIAZIDE 10; 6.25 MG/1; MG/1
TABLET ORAL
Qty: 90 TABLET | Refills: 0 | Status: CANCELLED | OUTPATIENT
Start: 2022-04-09

## 2022-04-11 RX ORDER — BISOPROLOL FUMARATE AND HYDROCHLOROTHIAZIDE 10; 6.25 MG/1; MG/1
TABLET ORAL
Qty: 30 TABLET | Refills: 3 | Status: SHIPPED | OUTPATIENT
Start: 2022-04-11 | End: 2022-07-11

## 2022-05-27 ENCOUNTER — HOSPITAL ENCOUNTER (OUTPATIENT)
Dept: CARDIOLOGY | Facility: HOSPITAL | Age: 57
Discharge: HOME | End: 2022-05-27
Admitting: INTERNAL MEDICINE

## 2022-05-27 DIAGNOSIS — E78.00 PURE HYPERCHOLESTEROLEMIA: ICD-10-CM

## 2022-05-27 DIAGNOSIS — R00.2 PALPITATIONS: ICD-10-CM

## 2022-05-27 DIAGNOSIS — R06.02 SOB (SHORTNESS OF BREATH): ICD-10-CM

## 2022-05-27 DIAGNOSIS — I10 ESSENTIAL HYPERTENSION: ICD-10-CM

## 2022-05-27 LAB
ALBUMIN SERPL-MCNC: 4.6 G/DL (ref 3.5–5.2)
ALBUMIN/GLOB SERPL: 1.9 G/DL
ALP SERPL-CCNC: 59 U/L (ref 39–117)
ALT SERPL W P-5'-P-CCNC: 18 U/L (ref 1–33)
ANION GAP SERPL CALCULATED.3IONS-SCNC: 12 MMOL/L (ref 5–15)
AST SERPL-CCNC: 23 U/L (ref 1–32)
BILIRUB SERPL-MCNC: 0.4 MG/DL (ref 0–1.2)
BUN SERPL-MCNC: 12 MG/DL (ref 6–20)
BUN/CREAT SERPL: 16.4 (ref 7–25)
CALCIUM SPEC-SCNC: 9.6 MG/DL (ref 8.6–10.5)
CHLORIDE SERPL-SCNC: 103 MMOL/L (ref 98–107)
CHOLEST SERPL-MCNC: 212 MG/DL (ref 0–200)
CO2 SERPL-SCNC: 25 MMOL/L (ref 22–29)
CREAT SERPL-MCNC: 0.73 MG/DL (ref 0.57–1)
EGFRCR SERPLBLD CKD-EPI 2021: 96.7 ML/MIN/1.73
GLOBULIN UR ELPH-MCNC: 2.4 GM/DL
GLUCOSE SERPL-MCNC: 95 MG/DL (ref 65–99)
HDLC SERPL-MCNC: 52 MG/DL (ref 40–60)
LDLC SERPL CALC-MCNC: 129 MG/DL (ref 0–100)
LDLC/HDLC SERPL: 2.39 {RATIO}
POTASSIUM SERPL-SCNC: 4.7 MMOL/L (ref 3.5–5.2)
PROT SERPL-MCNC: 7 G/DL (ref 6–8.5)
SODIUM SERPL-SCNC: 140 MMOL/L (ref 136–145)
TRIGL SERPL-MCNC: 178 MG/DL (ref 0–150)
VLDLC SERPL-MCNC: 31 MG/DL (ref 5–40)

## 2022-05-27 PROCEDURE — 36415 COLL VENOUS BLD VENIPUNCTURE: CPT | Performed by: INTERNAL MEDICINE

## 2022-05-27 PROCEDURE — 80053 COMPREHEN METABOLIC PANEL: CPT | Performed by: INTERNAL MEDICINE

## 2022-05-27 PROCEDURE — 80061 LIPID PANEL: CPT | Performed by: INTERNAL MEDICINE

## 2022-06-02 ENCOUNTER — OFFICE VISIT (OUTPATIENT)
Dept: CARDIOLOGY | Facility: CLINIC | Age: 57
End: 2022-06-02

## 2022-06-02 VITALS
SYSTOLIC BLOOD PRESSURE: 162 MMHG | HEART RATE: 70 BPM | DIASTOLIC BLOOD PRESSURE: 81 MMHG | BODY MASS INDEX: 30.82 KG/M2 | WEIGHT: 185 LBS | HEIGHT: 65 IN

## 2022-06-02 DIAGNOSIS — R00.2 PALPITATIONS: ICD-10-CM

## 2022-06-02 DIAGNOSIS — E78.5 HYPERLIPIDEMIA, UNSPECIFIED HYPERLIPIDEMIA TYPE: Primary | ICD-10-CM

## 2022-06-02 DIAGNOSIS — I10 ESSENTIAL HYPERTENSION: ICD-10-CM

## 2022-06-02 DIAGNOSIS — E55.9 VITAMIN D DEFICIENCY: ICD-10-CM

## 2022-06-02 PROCEDURE — 99214 OFFICE O/P EST MOD 30 MIN: CPT | Performed by: INTERNAL MEDICINE

## 2022-06-02 PROCEDURE — 93000 ELECTROCARDIOGRAM COMPLETE: CPT | Performed by: INTERNAL MEDICINE

## 2022-06-02 NOTE — PROGRESS NOTES
1 YR    Subjective:        Braydon Soria is a 56 y.o. female who here for follow up    CC  RARE NECK PAIN  HPI  56 years old female with a known history of benign essential arterial hypertension palpitations hyperlipidemia and vitamin D deficiency has been complaining of the rare neck pains     Problems Addressed this Visit        Cardiac and Vasculature    Essential hypertension    Palpitations    Relevant Orders    Comprehensive Metabolic Panel    Thyroid Panel With TSH    Vitamin D 25 Hydroxy    Hyperlipidemia - Primary    Relevant Orders    Lipid Panel       Endocrine and Metabolic    Vitamin D deficiency    Relevant Orders    Comprehensive Metabolic Panel    Thyroid Panel With TSH    Vitamin D 25 Hydroxy      Diagnoses       Codes Comments    Hyperlipidemia, unspecified hyperlipidemia type    -  Primary ICD-10-CM: E78.5  ICD-9-CM: 272.4     Palpitations     ICD-10-CM: R00.2  ICD-9-CM: 785.1     Vitamin D deficiency     ICD-10-CM: E55.9  ICD-9-CM: 268.9     Essential hypertension     ICD-10-CM: I10  ICD-9-CM: 401.9         .    The following portions of the patient's history were reviewed and updated as appropriate: allergies, current medications, past family history, past medical history, past social history, past surgical history and problem list.    Past Medical History:   Diagnosis Date   • Anxiety    • Arthritis    • Chest pressure    • Depression    • Dyspnea    • Gallbladder disease     gallstones   • Hormone replacement therapy (HRT)    • Hypercholesterolemia    • Hyperlipidemia    • Hypertension    • Insomnia    • Low back pain    • Lumbosacral disc disease    • Migraines    • Peripheral neuropathy    • Rheumatoid arthritis (HCC)    • SOB (shortness of breath)    • Thyroid nodule      reports that she has quit smoking. She has never used smokeless tobacco. She reports that she does not drink alcohol and does not use drugs.   Family History   Problem Relation Age of Onset   • Stroke Mother    •  "Migraines Mother    • Heart disease Father    • Heart attack Father    • Heart disease Brother    • Heart attack Brother    • Stroke Paternal Grandmother    • Breast cancer Neg Hx        Review of Systems  Constitutional: No wt loss, fever, fatigue  Gastrointestinal: No nausea, abdominal pain  Behavioral/Psych: No insomnia or anxiety   Cardiovascular no chest pains  Objective:       Physical Exam  /81   Pulse 70   Ht 165.1 cm (65\")   Wt 83.9 kg (185 lb)   LMP 01/20/2015 Comment: PERIMENOPAUSAL.  BMI 30.79 kg/m²   General appearance: No acute changes   Neck: Trachea midline; NECK, supple, no thyromegaly or lymphadenopathy   Lungs: Normal size and shape, normal breath sounds, equal distribution of air, no rales and rhonchi   CV: S1-S2 regular, no murmurs, no rub, no gallop   Abdomen: Soft, nontender; no masses , no abnormal abdominal sounds   Extremities: No deformity , normal color , no peripheral edema   Skin: Normal temperature, turgor and texture; no rash, ulcers            ECG 12 Lead    Date/Time: 6/2/2022 10:14 AM  Performed by: Lakhwinder Amos MD  Authorized by: Lakhwinder Amos MD   Comparison: compared with previous ECG   Similar to previous ECG  Rhythm: sinus rhythm    Clinical impression: non-specific ECG              Echocardiogram:        Current Outpatient Medications:   •  amLODIPine (NORVASC) 5 MG tablet, Take 1 tablet by mouth once daily, Disp: 90 tablet, Rfl: 1  •  bisoprolol-hydrochlorothiazide (ZIAC) 10-6.25 MG per tablet, Take 1 tablet by mouth once daily, Disp: 30 tablet, Rfl: 3  •  citalopram (CeleXA) 40 MG tablet, Take 40 mg by mouth Daily., Disp: , Rfl:   •  fluticasone (FLONASE) 50 MCG/ACT nasal spray, 2 sprays into the nostril(s) as directed by provider Daily., Disp: 1 bottle, Rfl: 12  •  rosuvastatin (CRESTOR) 5 MG tablet, Take 1 tablet by mouth Daily., Disp: 90 tablet, Rfl: 1  •  vitamin B-12 (CYANOCOBALAMIN) 100 MCG tablet, Take 50 mcg by mouth Daily., Disp: , " Rfl:   •  vitamin D3 125 MCG (5000 UT) capsule capsule, Take 5,000 Units by mouth Daily., Disp: , Rfl:    Assessment:        Patient Active Problem List   Diagnosis   • Midline low back pain with left-sided sciatica   • Hyperreflexia   • Incomplete bladder emptying   • Thyroid enlargement   • Essential hypertension   • Chronic bilateral low back pain with bilateral sciatica   • DDD (degenerative disc disease), lumbar   • Lumbar spondylolysis   • Atrophy of muscle of lower leg   • Arthritis of both hands   • Patellar tendonitis of both knees   • Chondromalacia of patella   • Palpitations   • Abnormal echocardiogram   • Degeneration of intervertebral disc   • Vitamin D deficiency   • Seasonal allergies   • Posttraumatic stress disorder   • Hyperlipidemia   • Depressive disorder   • Chronic back pain   • Body mass index (BMI) of 25.0 to 29.9   • Anxiety   • Rheumatoid arteritis (HCC)   • SOB (shortness of breath)           Component   Ref Range & Units 6 d ago   (5/27/22) 1 yr ago   (5/28/21) 1 yr ago   (4/6/21) 1 yr ago   (8/13/20) 2 yr ago   (2/21/20)   Total Cholesterol   0 - 200 mg/dL 212 High   154  287 High   266 High   263 High     Triglycerides   0 - 150 mg/dL 178 High   141  234 High   179 High   139    HDL Cholesterol   40 - 60 mg/dL 52  52  54  53  52    LDL Cholesterol    0 - 100 mg/dL 129 High   77  188 High   177 High   183 High     VLDL Cholesterol   5 - 40 mg/dL 31  25  45 High   35.8  27.8    LDL/HDL Ratio  2.39  1.42  3.45  3.34  3.52    Resulting Agency  RO LAB  RO [facility]  [facility]  [facility]  [facility]     Component   Ref Range & Units 6 d ago   (5/27/22) 1 yr ago   (5/28/21) 1 yr ago   (4/6/21) 1 yr ago   (9/10/20) 1 yr ago   (8/13/20) 2 yr ago   (2/21/20) 2 yr ago   (8/22/19)   Glucose   65 - 99 mg/dL 95  98  95  94 R  95  102 High   86 R    BUN   6 - 20 mg/dL 12  13  13  15 R  15  10  11 R    Creatinine   0.57 - 1.00 mg/dL 0.73  0.65  0.68  0.6 Low  R  0.65  0.75  0.6 Low  R    Sodium   136 -  145 mmol/L 140  138  137  136 Low  R  140  140  138 R    Potassium   3.5 - 5.2 mmol/L 4.7  4.4  4.3  3.9 R  5.6 High   4.3  4.5 R    Chloride   98 - 107 mmol/L 103  101  98  102  102  101  100    CO2   22.0 - 29.0 mmol/L 25.0  27.6  28.8   27.0  27.1     Calcium   8.6 - 10.5 mg/dL 9.6  9.5  9.9  8.9 R  9.9  9.6  9.8 R    Total Protein   6.0 - 8.5 g/dL 7.0  6.8  7.0   6.9  7.4  7.8 R    Albumin   3.50 - 5.20 g/dL 4.60  4.50  4.50   4.30  4.50  4.4 R    ALT (SGPT)   1 - 33 U/L 18  14  14   12  10  9 Low  R    AST (SGOT)   1 - 32 U/L 23  18  20   15  17  29 R    Alkaline Phosphatase   39 - 117 U/L 59  62  71   58  67  55 R    Total Bilirubin   0.0 - 1.2 mg/dL 0.4  0.3  0.4   0.3  0.4 R  0.5 R    Globulin   gm/dL 2.4  2.3                 Plan:            ICD-10-CM ICD-9-CM   1. Hyperlipidemia, unspecified hyperlipidemia type  E78.5 272.4   2. Palpitations  R00.2 785.1   3. Vitamin D deficiency  E55.9 268.9   4. Essential hypertension  I10 401.9     1. Palpitations  Under control  - Comprehensive Metabolic Panel; Future  - Thyroid Panel With TSH; Future  - Vitamin D 25 Hydroxy; Future    2. Vitamin D deficiency  Check the levels  - Comprehensive Metabolic Panel; Future  - Thyroid Panel With TSH; Future  - Vitamin D 25 Hydroxy; Future    3. Hyperlipidemia, unspecified hyperlipidemia type  Continue current treatment  - Lipid Panel; Future    4. Essential hypertension  Continue current treatment     1 YR WITH FLP/CMP  COUNSELING:    Braydon Titus was given to patient for the following topics: diagnostic results, risk factor reductions, impressions, risks and benefits of treatment options and importance of treatment compliance .       SMOKING COUNSELING:    [unfilled]    Dictated using Dragon dictation

## 2022-07-07 RX ORDER — ROSUVASTATIN CALCIUM 5 MG/1
TABLET, COATED ORAL
Qty: 30 TABLET | Refills: 0 | Status: CANCELLED | OUTPATIENT
Start: 2022-07-07

## 2022-07-08 RX ORDER — ROSUVASTATIN CALCIUM 5 MG/1
TABLET, COATED ORAL
Qty: 90 TABLET | Refills: 1 | Status: SHIPPED | OUTPATIENT
Start: 2022-07-08 | End: 2022-07-11

## 2022-07-11 RX ORDER — ROSUVASTATIN CALCIUM 5 MG/1
TABLET, COATED ORAL
Qty: 30 TABLET | Refills: 5 | Status: SHIPPED | OUTPATIENT
Start: 2022-07-11

## 2022-07-11 RX ORDER — BISOPROLOL FUMARATE AND HYDROCHLOROTHIAZIDE 10; 6.25 MG/1; MG/1
TABLET ORAL
Qty: 30 TABLET | Refills: 3 | Status: SHIPPED | OUTPATIENT
Start: 2022-07-11 | End: 2022-11-18

## 2022-08-04 ENCOUNTER — APPOINTMENT (OUTPATIENT)
Dept: GENERAL RADIOLOGY | Facility: HOSPITAL | Age: 57
End: 2022-08-04

## 2022-08-04 ENCOUNTER — HOSPITAL ENCOUNTER (EMERGENCY)
Facility: HOSPITAL | Age: 57
Discharge: HOME | End: 2022-08-04
Attending: EMERGENCY MEDICINE | Admitting: EMERGENCY MEDICINE

## 2022-08-04 VITALS
OXYGEN SATURATION: 95 % | RESPIRATION RATE: 18 BRPM | TEMPERATURE: 97.9 F | HEART RATE: 66 BPM | DIASTOLIC BLOOD PRESSURE: 86 MMHG | SYSTOLIC BLOOD PRESSURE: 137 MMHG

## 2022-08-04 DIAGNOSIS — S80.02XA CONTUSION OF LEFT KNEE, INITIAL ENCOUNTER: ICD-10-CM

## 2022-08-04 DIAGNOSIS — S16.1XXA STRAIN OF NECK MUSCLE, INITIAL ENCOUNTER: ICD-10-CM

## 2022-08-04 DIAGNOSIS — S20.219A CONTUSION OF CHEST WALL, UNSPECIFIED LATERALITY, INITIAL ENCOUNTER: ICD-10-CM
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
## 2022-08-04 PROCEDURE — 71046 X-RAY EXAM CHEST 2 VIEWS: CPT

## 2022-08-04 PROCEDURE — 73560 X-RAY EXAM OF KNEE 1 OR 2: CPT

## 2022-08-04 PROCEDURE — 99283 EMERGENCY DEPT VISIT LOW MDM: CPT

## 2022-08-04 PROCEDURE — 72050 X-RAY EXAM NECK SPINE 4/5VWS: CPT
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
## 2022-08-05 ENCOUNTER — TELEPHONE (OUTPATIENT)
Dept: FAMILY MEDICINE CLINIC | Facility: CLINIC | Age: 57
End: 2022-08-05
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
## 2022-08-05 NOTE — TELEPHONE ENCOUNTER
No appts available, will have to wait until scheduled appt   Was the patient seen in the last year in this department? Yes     Does patient have an active prescription for medications requested? Yes     Received Request Via: Patient
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
## 2022-08-11 ENCOUNTER — OFFICE VISIT (OUTPATIENT)
Dept: FAMILY MEDICINE CLINIC | Facility: CLINIC | Age: 57
End: 2022-08-11

## 2022-08-11 VITALS
BODY MASS INDEX: 30.66 KG/M2 | DIASTOLIC BLOOD PRESSURE: 80 MMHG | OXYGEN SATURATION: 96 % | WEIGHT: 184 LBS | HEIGHT: 65 IN | SYSTOLIC BLOOD PRESSURE: 142 MMHG | HEART RATE: 62 BPM | TEMPERATURE: 97.6 F

## 2022-08-11 DIAGNOSIS — M54.40 BILATERAL LOW BACK PAIN WITH SCIATICA, SCIATICA LATERALITY UNSPECIFIED, UNSPECIFIED CHRONICITY: ICD-10-CM

## 2022-08-11 DIAGNOSIS — M25.562 ACUTE PAIN OF LEFT KNEE: ICD-10-CM
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
## 2022-08-11 DIAGNOSIS — M54.2 NECK PAIN: ICD-10-CM

## 2022-08-11 PROCEDURE — 99214 OFFICE O/P EST MOD 30 MIN: CPT | Performed by: INTERNAL MEDICINE
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
## 2022-09-12 ENCOUNTER — OFFICE VISIT (OUTPATIENT)
Dept: FAMILY MEDICINE CLINIC | Facility: CLINIC | Age: 57
End: 2022-09-12

## 2022-09-12 VITALS
HEART RATE: 52 BPM | RESPIRATION RATE: 18 BRPM | TEMPERATURE: 98.2 F | HEIGHT: 65 IN | OXYGEN SATURATION: 94 % | BODY MASS INDEX: 31.12 KG/M2 | WEIGHT: 186.8 LBS | DIASTOLIC BLOOD PRESSURE: 80 MMHG | SYSTOLIC BLOOD PRESSURE: 130 MMHG

## 2022-09-12 DIAGNOSIS — F41.9 ANXIETY: ICD-10-CM

## 2022-09-12 DIAGNOSIS — Z00.00 MEDICARE ANNUAL WELLNESS VISIT, SUBSEQUENT: Primary | ICD-10-CM

## 2022-09-12 DIAGNOSIS — I10 ESSENTIAL HYPERTENSION: ICD-10-CM

## 2022-09-12 DIAGNOSIS — R73.9 HYPERGLYCEMIA: ICD-10-CM

## 2022-09-12 DIAGNOSIS — R53.83 FATIGUE, UNSPECIFIED TYPE: ICD-10-CM

## 2022-09-12 DIAGNOSIS — E55.9 VITAMIN D DEFICIENCY: ICD-10-CM

## 2022-09-12 DIAGNOSIS — E78.00 PURE HYPERCHOLESTEROLEMIA: ICD-10-CM

## 2022-09-12 PROBLEM — M51.36 ANNULAR TEAR OF LUMBAR DISC: Status: ACTIVE | Noted: 2022-09-12

## 2022-09-12 PROBLEM — N34.0: Status: ACTIVE | Noted: 2020-09-02

## 2022-09-12 PROBLEM — M06.9 RHEUMATOID ARTHRITIS (HCC): Status: ACTIVE | Noted: 2022-09-12

## 2022-09-12 PROBLEM — M51.369 ANNULAR TEAR OF LUMBAR DISC: Status: ACTIVE | Noted: 2022-09-12

## 2022-09-12 LAB
25(OH)D3 SERPL-MCNC: 42.7 NG/ML (ref 30–100)
ALBUMIN SERPL-MCNC: 4.7 G/DL (ref 3.5–5.2)
ALBUMIN/GLOB SERPL: 1.9 G/DL
ALP SERPL-CCNC: 59 U/L (ref 39–117)
ALT SERPL W P-5'-P-CCNC: 14 U/L (ref 1–33)
ANION GAP SERPL CALCULATED.3IONS-SCNC: 9.7 MMOL/L (ref 5–15)
AST SERPL-CCNC: 22 U/L (ref 1–32)
BACTERIA UR QL AUTO: ABNORMAL /HPF
BILIRUB SERPL-MCNC: 0.5 MG/DL (ref 0–1.2)
BILIRUB UR QL STRIP: NEGATIVE
BUN SERPL-MCNC: 12 MG/DL (ref 6–20)
BUN/CREAT SERPL: 16.4 (ref 7–25)
CALCIUM SPEC-SCNC: 9.9 MG/DL (ref 8.6–10.5)
CHLORIDE SERPL-SCNC: 101 MMOL/L (ref 98–107)
CHOLEST SERPL-MCNC: 186 MG/DL (ref 0–200)
CLARITY UR: CLEAR
CO2 SERPL-SCNC: 29.3 MMOL/L (ref 22–29)
COLOR UR: YELLOW
CREAT SERPL-MCNC: 0.73 MG/DL (ref 0.57–1)
EGFRCR SERPLBLD CKD-EPI 2021: 96.7 ML/MIN/1.73
ERYTHROCYTE [DISTWIDTH] IN BLOOD BY AUTOMATED COUNT: 13.1 % (ref 12.3–15.4)
GLOBULIN UR ELPH-MCNC: 2.5 GM/DL
GLUCOSE SERPL-MCNC: 110 MG/DL (ref 65–99)
GLUCOSE UR STRIP-MCNC: NEGATIVE MG/DL
HCT VFR BLD AUTO: 42.1 % (ref 34–46.6)
HDLC SERPL-MCNC: 57 MG/DL (ref 40–60)
HGB BLD-MCNC: 13.6 G/DL (ref 12–15.9)
HGB UR QL STRIP.AUTO: ABNORMAL
HYALINE CASTS UR QL AUTO: ABNORMAL /LPF
KETONES UR QL STRIP: NEGATIVE
LDLC SERPL CALC-MCNC: 103 MG/DL (ref 0–100)
LDLC/HDLC SERPL: 1.74 {RATIO}
LEUKOCYTE ESTERASE UR QL STRIP.AUTO: NEGATIVE
LYMPHOCYTES # BLD AUTO: 3.2 10*3/MM3 (ref 0.7–3.1)
LYMPHOCYTES NFR BLD AUTO: 37 % (ref 19.6–45.3)
MCH RBC QN AUTO: 29.2 PG (ref 26.6–33)
MCHC RBC AUTO-ENTMCNC: 32.4 G/DL (ref 31.5–35.7)
MCV RBC AUTO: 90.1 FL (ref 79–97)
MONOCYTES # BLD AUTO: 0.7 10*3/MM3 (ref 0.1–0.9)
MONOCYTES NFR BLD AUTO: 8.1 % (ref 5–12)
NEUTROPHILS NFR BLD AUTO: 4.7 10*3/MM3 (ref 1.7–7)
NEUTROPHILS NFR BLD AUTO: 54.9 % (ref 42.7–76)
NITRITE UR QL STRIP: NEGATIVE
PH UR STRIP.AUTO: 7 [PH] (ref 5–8)
PLATELET # BLD AUTO: 306 10*3/MM3 (ref 140–450)
PMV BLD AUTO: 7.3 FL (ref 6–12)
POTASSIUM SERPL-SCNC: 4.5 MMOL/L (ref 3.5–5.2)
PROT SERPL-MCNC: 7.2 G/DL (ref 6–8.5)
PROT UR QL STRIP: NEGATIVE
RBC # BLD AUTO: 4.67 10*6/MM3 (ref 3.77–5.28)
RBC # UR STRIP: ABNORMAL /HPF
REF LAB TEST METHOD: ABNORMAL
SODIUM SERPL-SCNC: 140 MMOL/L (ref 136–145)
SP GR UR STRIP: 1.01 (ref 1–1.03)
SQUAMOUS #/AREA URNS HPF: ABNORMAL /HPF
TRIGL SERPL-MCNC: 148 MG/DL (ref 0–150)
TSH SERPL DL<=0.05 MIU/L-ACNC: 3.14 UIU/ML (ref 0.27–4.2)
UROBILINOGEN UR QL STRIP: ABNORMAL
VLDLC SERPL-MCNC: 26 MG/DL (ref 5–40)
WBC # UR STRIP: ABNORMAL /HPF
WBC NRBC COR # BLD: 8.6 10*3/MM3 (ref 3.4–10.8)

## 2022-09-12 PROCEDURE — 1159F MED LIST DOCD IN RCRD: CPT | Performed by: NURSE PRACTITIONER

## 2022-09-12 PROCEDURE — G0439 PPPS, SUBSEQ VISIT: HCPCS | Performed by: NURSE PRACTITIONER

## 2022-09-12 PROCEDURE — 1126F AMNT PAIN NOTED NONE PRSNT: CPT | Performed by: NURSE PRACTITIONER

## 2022-09-12 PROCEDURE — 82306 VITAMIN D 25 HYDROXY: CPT | Performed by: NURSE PRACTITIONER

## 2022-09-12 PROCEDURE — 1170F FXNL STATUS ASSESSED: CPT | Performed by: NURSE PRACTITIONER

## 2022-09-12 PROCEDURE — 81001 URINALYSIS AUTO W/SCOPE: CPT | Performed by: NURSE PRACTITIONER

## 2022-09-12 PROCEDURE — 85025 COMPLETE CBC W/AUTO DIFF WBC: CPT | Performed by: NURSE PRACTITIONER

## 2022-09-12 PROCEDURE — 80053 COMPREHEN METABOLIC PANEL: CPT | Performed by: NURSE PRACTITIONER

## 2022-09-12 PROCEDURE — 84443 ASSAY THYROID STIM HORMONE: CPT | Performed by: NURSE PRACTITIONER

## 2022-09-12 PROCEDURE — 80061 LIPID PANEL: CPT | Performed by: NURSE PRACTITIONER

## 2022-09-12 NOTE — PROGRESS NOTES
The ABCs of the Annual Wellness Visit  Subsequent Medicare Wellness Visit    Chief Complaint   Patient presents with   • Wellness Check     Lab work   • Hypertension   • Hyperlipidemia      Subjective    History of Present Illness:  Braydon Soria is a 56 y.o. female who presents for a Subsequent Medicare Wellness Visit.    The following portions of the patient's history were reviewed and   updated as appropriate: allergies, current medications, past family history, past medical history, past social history, past surgical history and problem list.    Compared to one year ago, the patient feels her physical   health is the same.    Compared to one year ago, the patient feels her mental   health is the same.    Recent Hospitalizations:  She was not admitted to the hospital during the last year.       Current Medical Providers:  Patient Care Team:  Pawan Arambula MD as PCP - General (Internal Medicine)  Molina Smith MD as Surgeon (General Surgery)  Remington Barboza MD as Consulting Physician (Neurology)  Jordan Aponte MD as Surgeon (Orthopedic Surgery)    Outpatient Medications Prior to Visit   Medication Sig Dispense Refill   • amLODIPine (NORVASC) 5 MG tablet Take 1 tablet by mouth once daily 90 tablet 1   • bisoprolol-hydrochlorothiazide (ZIAC) 10-6.25 MG per tablet Take 1 tablet by mouth once daily 30 tablet 3   • citalopram (CeleXA) 40 MG tablet Take 40 mg by mouth Daily.     • rosuvastatin (CRESTOR) 5 MG tablet Take 1 tablet by mouth once daily 30 tablet 5   • vitamin B-12 (CYANOCOBALAMIN) 100 MCG tablet Take 50 mcg by mouth Daily.     • vitamin D3 125 MCG (5000 UT) capsule capsule Take 5,000 Units by mouth Daily.     • fluticasone (FLONASE) 50 MCG/ACT nasal spray 2 sprays into the nostril(s) as directed by provider Daily. 1 bottle 12     No facility-administered medications prior to visit.       No opioid medication identified on active medication list. I have reviewed chart for other  potential  high risk medication/s and harmful drug interactions in the elderly.          Aspirin is not on active medication list.  Aspirin use is not indicated based on review of current medical condition/s. Risk of harm outweighs potential benefits.  .    Patient Active Problem List   Diagnosis   • Midline low back pain with left-sided sciatica   • Hyperreflexia   • Incomplete bladder emptying   • Thyroid enlargement   • Essential hypertension   • Chronic bilateral low back pain with bilateral sciatica   • DDD (degenerative disc disease), lumbar   • Lumbar spondylolysis   • Atrophy of muscle of lower leg   • Arthritis of both hands   • Patellar tendonitis of both knees   • Chondromalacia of patella   • Palpitations   • Abnormal echocardiogram   • Degeneration of intervertebral disc   • Vitamin D deficiency   • Seasonal allergies   • Posttraumatic stress disorder   • Hyperlipidemia   • Depressive disorder   • Chronic back pain   • Body mass index (BMI) of 25.0 to 29.9   • Anxiety   • Rheumatoid arteritis (HCC)   • SOB (shortness of breath)   • Annular tear of lumbar disc   • Rheumatoid arthritis (HCC)   • [place]'s duct abscess   • Fatigue   • Medicare annual wellness visit, subsequent     Advance Care Planning  Advance Directive is not on file.  ACP discussion was held with the patient during this visit. Patient has an advance directive (not in EMR), copy requested. Patient does not have an advance directive, information provided.    Review of Systems   Constitutional: Negative for activity change, appetite change and chills.   HENT: Negative for congestion, dental problem, drooling, ear discharge, trouble swallowing and voice change.    Eyes: Negative for pain, itching and visual disturbance.   Respiratory: Negative for apnea, choking, chest tightness, shortness of breath and wheezing.    Cardiovascular: Negative for chest pain, palpitations and leg swelling.   Gastrointestinal: Negative for rectal pain and  "vomiting.   Endocrine: Negative for cold intolerance, heat intolerance, polydipsia, polyphagia and polyuria.   Genitourinary: Negative for difficulty urinating, dysuria, flank pain and frequency.   Musculoskeletal: Negative for arthralgias and back pain.   Skin: Negative for color change and rash.   Allergic/Immunologic: Negative for environmental allergies, food allergies and immunocompromised state.   Neurological: Negative for dizziness.   Hematological: Negative for adenopathy. Does not bruise/bleed easily.   Psychiatric/Behavioral: Negative for agitation and suicidal ideas. The patient is not nervous/anxious.         Objective    Vitals:    09/12/22 0804   BP: 130/80   BP Location: Left arm   Pulse: 52   Resp: 18   Temp: 98.2 °F (36.8 °C)   SpO2: 94%   Weight: 84.7 kg (186 lb 12.8 oz)   Height: 165.1 cm (65\")   PainSc: 0-No pain     Estimated body mass index is 31.09 kg/m² as calculated from the following:    Height as of this encounter: 165.1 cm (65\").    Weight as of this encounter: 84.7 kg (186 lb 12.8 oz).    BMI is >= 30 and <35. (Class 1 Obesity). The following options were offered after discussion;: weight loss educational material (shared in after visit summary), exercise counseling/recommendations and nutrition counseling/recommendations      Does the patient have evidence of cognitive impairment? No    Physical Exam  Constitutional:       General: She is not in acute distress.     Appearance: Normal appearance. She is not ill-appearing, toxic-appearing or diaphoretic.   HENT:      Head: Normocephalic.      Right Ear: Tympanic membrane and external ear normal. There is no impacted cerumen.      Left Ear: Tympanic membrane and external ear normal. There is no impacted cerumen.      Nose: Nose normal. No congestion or rhinorrhea.      Mouth/Throat:      Mouth: Mucous membranes are moist.      Pharynx: Oropharynx is clear. No oropharyngeal exudate or posterior oropharyngeal erythema.   Eyes:      General: "         Right eye: No discharge.         Left eye: No discharge.      Extraocular Movements: Extraocular movements intact.      Conjunctiva/sclera: Conjunctivae normal.      Pupils: Pupils are equal, round, and reactive to light.   Neck:      Vascular: No carotid bruit.   Cardiovascular:      Rate and Rhythm: Normal rate and regular rhythm.      Pulses: Normal pulses.      Heart sounds: Normal heart sounds. No murmur heard.    No friction rub. No gallop.   Pulmonary:      Effort: Pulmonary effort is normal. No respiratory distress.      Breath sounds: Normal breath sounds. No wheezing, rhonchi or rales.   Chest:      Chest wall: No tenderness.   Abdominal:      General: Abdomen is flat. Bowel sounds are normal. There is no distension.      Palpations: Abdomen is soft. There is no mass.      Tenderness: There is no abdominal tenderness. There is no guarding or rebound.      Hernia: No hernia is present.   Musculoskeletal:         General: No swelling or tenderness. Normal range of motion.      Cervical back: Normal range of motion and neck supple. No tenderness.   Skin:     General: Skin is warm and dry.      Coloration: Skin is not jaundiced or pale.      Findings: No erythema or rash.   Neurological:      Mental Status: She is alert and oriented to person, place, and time.      Sensory: No sensory deficit.      Motor: No weakness.      Gait: Gait normal.   Psychiatric:         Mood and Affect: Mood normal.         Behavior: Behavior normal.         Thought Content: Thought content normal.         Judgment: Judgment normal.                 HEALTH RISK ASSESSMENT    Smoking Status:  Social History     Tobacco Use   Smoking Status Former Smoker   Smokeless Tobacco Never Used   Tobacco Comment    smokes occasionally     Alcohol Consumption:  Social History     Substance and Sexual Activity   Alcohol Use No    Comment: daily caffiene     Fall Risk Screen:    [facility] Fall Risk Assessment has not been  completed.    Depression Screening:  PHQ-2/PHQ-9 Depression Screening 9/12/2022   Retired PHQ-9 Total Score -   Retired Total Score -   Little Interest or Pleasure in Doing Things 0-->not at all   Feeling Down, Depressed or Hopeless 0-->not at all   PHQ-9: Brief Depression Severity Measure Score 0       Health Habits and Functional and Cognitive Screening:  Functional & Cognitive Status 9/12/2022   Do you have difficulty preparing food and eating? No   Do you have difficulty bathing yourself, getting dressed or grooming yourself? No   Do you have difficulty using the toilet? No   Do you have difficulty moving around from place to place? No   Do you have trouble with steps or getting out of a bed or a chair? No   Current Diet Well Balanced Diet   Dental Exam Up to date   Eye Exam Up to date   Exercise (times per week) 3 times per week   Current Exercises Include -   Current Exercise Activities Include -   Do you need help using the phone?  No   Are you deaf or do you have serious difficulty hearing?  No   Do you need help with transportation? No   Do you need help shopping? No   Do you need help preparing meals?  No   Do you need help with housework?  No   Do you need help with laundry? No   Do you need help taking your medications? No   Do you need help managing money? No   Do you ever drive or ride in a car without wearing a seat belt? No   Have you felt unusual stress, anger or loneliness in the last month? No   Who do you live with? Spouse   If you need help, do you have trouble finding someone available to you? No   Have you been bothered in the last four weeks by sexual problems? No   Do you have difficulty concentrating, remembering or making decisions? No       Age-appropriate Screening Schedule:  Refer to the list below for future screening recommendations based on patient's age, sex and/or medical conditions. Orders for these recommended tests are listed in the plan section. The patient has been provided  with a written plan.    Health Maintenance   Topic Date Due   • TDAP/TD VACCINES (2 - Tdap) 04/14/2009   • ZOSTER VACCINE (1 of 2) Never done   • PAP SMEAR  Never done   • INFLUENZA VACCINE  10/01/2022   • LIPID PANEL  05/27/2023   • MAMMOGRAM  03/18/2024              Assessment & Plan   CMS Preventative Services Quick Reference  Risk Factors Identified During Encounter  None Identified  The above risks/problems have been discussed with the patient.  Follow up actions/plans if indicated are seen below in the Assessment/Plan Section.  Pertinent information has been shared with the patient in the After Visit Summary.    Diagnoses and all orders for this visit:    1. Medicare annual wellness visit, subsequent (Primary)  -     CBC & Differential  -     Comprehensive Metabolic Panel  -     Lipid Panel  -     Urinalysis With Culture If Indicated -    2. Fatigue, unspecified type  -     TSH    3. Vitamin D deficiency  -     Vitamin D 25 hydroxy    4. Essential hypertension  -     Comprehensive Metabolic Panel    5. Pure hypercholesterolemia  -     Lipid Panel    6. Anxiety     Counseling was provided on nutrition, physical activity, development, and injury prevention, dental health, and safe sex practices patient verbalizes understanding no additional questions were asked.    Check vitamin D and TSH for fatigue and vitamin D deficiency.    Hypertension stable continue amlodipine Ziac.  Patient to continue low-salt diet and exercise.    Check lipid panel for hyperlipidemia.    Patient is stable with anxiety no depression noted taking Celexa 40 mg as prescribed.        Follow Up:   Return for Next scheduled follow up.     An After Visit Summary and PPPS were made available to the patient.          I spent 30 minutes caring for Braydon on this date of service. This time includes time spent by me in the following activities:preparing for the visit, reviewing tests, obtaining and/or reviewing a separately obtained history,  performing a medically appropriate examination and/or evaluation , counseling and educating the patient/family/caregiver, ordering medications, tests, or procedures, documenting information in the medical record, independently interpreting results and communicating that information with the patient/family/caregiver and care coordination

## 2022-09-14 DIAGNOSIS — R73.9 HYPERGLYCEMIA: Primary | ICD-10-CM

## 2022-09-14 LAB — HBA1C MFR BLD: 5.5 % (ref 4.8–5.6)

## 2022-09-14 PROCEDURE — 83036 HEMOGLOBIN GLYCOSYLATED A1C: CPT | Performed by: NURSE PRACTITIONER

## 2022-09-14 PROCEDURE — 36415 COLL VENOUS BLD VENIPUNCTURE: CPT | Performed by: NURSE PRACTITIONER

## 2022-09-22 RX ORDER — AMLODIPINE BESYLATE 5 MG/1
TABLET ORAL
Qty: 90 TABLET | Refills: 2 | Status: SHIPPED | OUTPATIENT
Start: 2022-09-22

## 2022-11-18 RX ORDER — BISOPROLOL FUMARATE AND HYDROCHLOROTHIAZIDE 10; 6.25 MG/1; MG/1
TABLET ORAL
Qty: 30 TABLET | Refills: 0 | OUTPATIENT
Start: 2022-11-18

## 2022-11-18 RX ORDER — BISOPROLOL FUMARATE AND HYDROCHLOROTHIAZIDE 10; 6.25 MG/1; MG/1
TABLET ORAL
Qty: 30 TABLET | Refills: 6 | Status: SHIPPED | OUTPATIENT
Start: 2022-11-18

## 2023-05-05 ENCOUNTER — HOSPITAL ENCOUNTER (OUTPATIENT)
Dept: CARDIOLOGY | Facility: HOSPITAL | Age: 58
Discharge: HOME | End: 2023-05-05
Payer: MEDICARE

## 2023-05-05 DIAGNOSIS — E55.9 VITAMIN D DEFICIENCY: ICD-10-CM

## 2023-05-05 DIAGNOSIS — R00.2 PALPITATIONS: ICD-10-CM

## 2023-05-05 DIAGNOSIS — E78.5 HYPERLIPIDEMIA, UNSPECIFIED HYPERLIPIDEMIA TYPE: ICD-10-CM

## 2023-05-05 LAB
25(OH)D3 SERPL-MCNC: 34.8 NG/ML (ref 30–100)
ALBUMIN SERPL-MCNC: 4.6 G/DL (ref 3.5–5.2)
ALBUMIN/GLOB SERPL: 1.8 G/DL
ALP SERPL-CCNC: 62 U/L (ref 39–117)
ALT SERPL W P-5'-P-CCNC: 10 U/L (ref 1–33)
ANION GAP SERPL CALCULATED.3IONS-SCNC: 9.9 MMOL/L (ref 5–15)
AST SERPL-CCNC: 22 U/L (ref 1–32)
BILIRUB SERPL-MCNC: 0.3 MG/DL (ref 0–1.2)
BUN SERPL-MCNC: 10 MG/DL (ref 6–20)
BUN/CREAT SERPL: 22.7 (ref 7–25)
CALCIUM SPEC-SCNC: 9.7 MG/DL (ref 8.6–10.5)
CHLORIDE SERPL-SCNC: 101 MMOL/L (ref 98–107)
CHOLEST SERPL-MCNC: 184 MG/DL (ref 0–200)
CO2 SERPL-SCNC: 29.1 MMOL/L (ref 22–29)
CREAT SERPL-MCNC: 0.44 MG/DL (ref 0.57–1)
EGFRCR SERPLBLD CKD-EPI 2021: 113 ML/MIN/1.73
GLOBULIN UR ELPH-MCNC: 2.6 GM/DL
GLUCOSE SERPL-MCNC: 112 MG/DL (ref 65–99)
HDLC SERPL-MCNC: 55 MG/DL (ref 40–60)
LDLC SERPL CALC-MCNC: 111 MG/DL (ref 0–100)
LDLC/HDLC SERPL: 1.97 {RATIO}
POTASSIUM SERPL-SCNC: 4.2 MMOL/L (ref 3.5–5.2)
PROT SERPL-MCNC: 7.2 G/DL (ref 6–8.5)
SODIUM SERPL-SCNC: 140 MMOL/L (ref 136–145)
T-UPTAKE NFR SERPL: 1.11 TBI (ref 0.8–1.3)
T4 SERPL-MCNC: 9.37 MCG/DL (ref 4.5–11.7)
TRIGL SERPL-MCNC: 102 MG/DL (ref 0–150)
TSH SERPL DL<=0.05 MIU/L-ACNC: 2.98 UIU/ML (ref 0.27–4.2)
VLDLC SERPL-MCNC: 18 MG/DL (ref 5–40)

## 2023-05-05 PROCEDURE — 84436 ASSAY OF TOTAL THYROXINE: CPT | Performed by: INTERNAL MEDICINE

## 2023-05-05 PROCEDURE — 36415 COLL VENOUS BLD VENIPUNCTURE: CPT | Performed by: INTERNAL MEDICINE

## 2023-05-05 PROCEDURE — 80061 LIPID PANEL: CPT | Performed by: INTERNAL MEDICINE

## 2023-05-05 PROCEDURE — 84443 ASSAY THYROID STIM HORMONE: CPT | Performed by: INTERNAL MEDICINE

## 2023-05-05 PROCEDURE — 82306 VITAMIN D 25 HYDROXY: CPT | Performed by: INTERNAL MEDICINE

## 2023-05-05 PROCEDURE — 84479 ASSAY OF THYROID (T3 OR T4): CPT | Performed by: INTERNAL MEDICINE

## 2023-05-05 PROCEDURE — 80053 COMPREHEN METABOLIC PANEL: CPT | Performed by: INTERNAL MEDICINE

## 2023-05-25 ENCOUNTER — OFFICE VISIT (OUTPATIENT)
Dept: CARDIOLOGY | Facility: CLINIC | Age: 58
End: 2023-05-25
Payer: MEDICARE

## 2023-05-25 VITALS
HEART RATE: 86 BPM | DIASTOLIC BLOOD PRESSURE: 80 MMHG | HEIGHT: 65 IN | SYSTOLIC BLOOD PRESSURE: 148 MMHG | WEIGHT: 183 LBS | BODY MASS INDEX: 30.49 KG/M2

## 2023-05-25 DIAGNOSIS — I10 ESSENTIAL HYPERTENSION: Primary | ICD-10-CM

## 2023-05-25 DIAGNOSIS — R00.2 PALPITATIONS: ICD-10-CM

## 2023-05-25 DIAGNOSIS — E78.00 PURE HYPERCHOLESTEROLEMIA: ICD-10-CM

## 2023-05-25 DIAGNOSIS — R94.31 ABNORMAL ELECTROCARDIOGRAM (ECG) (EKG): ICD-10-CM

## 2023-05-25 PROCEDURE — 3077F SYST BP >= 140 MM HG: CPT | Performed by: INTERNAL MEDICINE

## 2023-05-25 PROCEDURE — 99214 OFFICE O/P EST MOD 30 MIN: CPT | Performed by: INTERNAL MEDICINE

## 2023-05-25 PROCEDURE — 3079F DIAST BP 80-89 MM HG: CPT | Performed by: INTERNAL MEDICINE

## 2023-05-25 PROCEDURE — 93000 ELECTROCARDIOGRAM COMPLETE: CPT | Performed by: INTERNAL MEDICINE

## 2023-05-25 NOTE — PROGRESS NOTES
Subjective:        Braydon Soria is a 57 y.o. female who here for follow up    CC  PALPITATION  WITH CAFFIENE  HPI  57-year-old female complaining of the palpitation specially with the caffeine     Problems Addressed this Visit          Cardiac and Vasculature    Essential hypertension - Primary    Relevant Orders    Treadmill Stress Test    Lipid Panel    Comprehensive Metabolic Panel    Vitamin D 1,25 Dihydroxy    TSH    Palpitations    Relevant Orders    Treadmill Stress Test    Lipid Panel    Comprehensive Metabolic Panel    Vitamin D 1,25 Dihydroxy    TSH    Hyperlipidemia    Relevant Orders    Treadmill Stress Test    Lipid Panel    Comprehensive Metabolic Panel    Vitamin D 1,25 Dihydroxy    TSH     Other Visit Diagnoses       Abnormal electrocardiogram (ECG) (EKG)        Relevant Orders    Treadmill Stress Test    Lipid Panel    Comprehensive Metabolic Panel    Vitamin D 1,25 Dihydroxy    TSH          Diagnoses         Codes Comments    Essential hypertension    -  Primary ICD-10-CM: I10  ICD-9-CM: 401.9     Palpitations     ICD-10-CM: R00.2  ICD-9-CM: 785.1     Pure hypercholesterolemia     ICD-10-CM: E78.00  ICD-9-CM: 272.0     Abnormal electrocardiogram (ECG) (EKG)     ICD-10-CM: R94.31  ICD-9-CM: 794.31           .    The following portions of the patient's history were reviewed and updated as appropriate: allergies, current medications, past family history, past medical history, past social history, past surgical history and problem list.    Past Medical History:   Diagnosis Date    Anxiety     Arthritis     Chest pressure     Depression     Dyspnea     Gallbladder disease     gallstones    Hormone replacement therapy (HRT)     Hypercholesterolemia     Hyperlipidemia     Hypertension     Insomnia     Low back pain     Lumbosacral disc disease     Migraines     Peripheral neuropathy     Rheumatoid arthritis     SOB (shortness of breath)     Thyroid nodule      reports that she has been smoking  "cigarettes. She has never used smokeless tobacco. She reports that she does not drink alcohol and does not use drugs.   Family History   Problem Relation Age of Onset    Stroke Mother     Migraines Mother     Heart disease Father     Heart attack Father     Heart disease Brother     Heart attack Brother     Stroke Paternal Grandmother     Breast cancer Neg Hx        Review of Systems  Constitutional: No wt loss, fever, fatigue  Gastrointestinal: No nausea, abdominal pain  Behavioral/Psych: No insomnia or anxiety   Cardiovascular palpitation  Objective:       Physical Exam           Physical Exam  /80   Pulse 86   Ht 165.1 cm (65\")   Wt 83 kg (183 lb)   LMP 01/20/2015 Comment: PERIMENOPAUSAL.  BMI 30.45 kg/m²     General appearance: No acute changes   Eyes: Sclerae conjunctivae normal, pupils reactive   HENT: Atraumatic; oropharynx clear with moist mucous membranes and no mucosal ulcerations;  Neck: Trachea midline; NECK, supple, no thyromegaly or lymphadenopathy   Lungs: Normal size and shape, normal breath sounds, equal distribution of air, no rales and rhonchi   CV: S1-S2 regular, no murmurs, no rub, no gallop   Abdomen: Soft, nontender; no masses , no abnormal abdominal sounds   Extremities: No deformity , normal color , no peripheral edema   Skin: Normal temperature, turgor and texture; no rash, ulcers  Psych: Appropriate affect, alert and oriented to person, place and time             ECG 12 Lead    Date/Time: 5/25/2023 12:05 PM  Performed by: Lakhwinder Amos MD  Authorized by: Lakhwinder Amos MD   Comparison: compared with previous ECG   Similar to previous ECG  Rhythm: sinus rhythm  ST Flattening: all    Clinical impression: non-specific ECG          Echocardiogram:        Current Outpatient Medications:     amLODIPine (NORVASC) 5 MG tablet, Take 1 tablet by mouth once daily, Disp: 90 tablet, Rfl: 2    bisoprolol-hydrochlorothiazide (ZIAC) 10-6.25 MG per tablet, Take 1 tablet by mouth " once daily, Disp: 30 tablet, Rfl: 6    citalopram (CeleXA) 40 MG tablet, Take 1 tablet by mouth Daily., Disp: , Rfl:     rosuvastatin (CRESTOR) 5 MG tablet, Take 1 tablet by mouth once daily, Disp: 30 tablet, Rfl: 5    vitamin B-12 (CYANOCOBALAMIN) 100 MCG tablet, Take 50 mcg by mouth Daily., Disp: , Rfl:     vitamin D3 125 MCG (5000 UT) capsule capsule, Take 1 capsule by mouth Daily., Disp: , Rfl:    Assessment:        Patient Active Problem List   Diagnosis    Midline low back pain with left-sided sciatica    Hyperreflexia    Incomplete bladder emptying    Thyroid enlargement    Essential hypertension    Chronic bilateral low back pain with bilateral sciatica    DDD (degenerative disc disease), lumbar    Lumbar spondylolysis    Atrophy of muscle of lower leg    Arthritis of both hands    Patellar tendonitis of both knees    Chondromalacia of patella    Palpitations    Abnormal echocardiogram    Degeneration of intervertebral disc    Vitamin D deficiency    Seasonal allergies    Posttraumatic stress disorder    Hyperlipidemia    Depressive disorder    Chronic back pain    Body mass index (BMI) of 25.0 to 29.9    Anxiety    Rheumatoid arteritis    SOB (shortness of breath)    Annular tear of lumbar disc    Rheumatoid arthritis    Betances's duct abscess    Fatigue    Medicare annual wellness visit, subsequent               Plan:            ICD-10-CM ICD-9-CM   1. Essential hypertension  I10 401.9   2. Palpitations  R00.2 785.1   3. Pure hypercholesterolemia  E78.00 272.0   4. Abnormal electrocardiogram (ECG) (EKG)  R94.31 794.31     1. Essential hypertension  Continue current treatment  - Treadmill Stress Test  - Lipid Panel; Future  - Comprehensive Metabolic Panel; Future  - Vitamin D 1,25 Dihydroxy; Future  - TSH; Future    2. Palpitations  Considering the patient's symptoms as well as clinical situation and  EKG findings, along with cardiac risk factors, ischemic workup is necessary to rule out ischemic  cardiomyopathy, stress induced arrhythmias, and functional capacity for diagnosis as well as prognostic consideration    - Treadmill Stress Test  - Lipid Panel; Future  - Comprehensive Metabolic Panel; Future  - Vitamin D 1,25 Dihydroxy; Future  - TSH; Future    3. Pure hypercholesterolemia  Continue current treatment  - Treadmill Stress Test  - Lipid Panel; Future  - Comprehensive Metabolic Panel; Future  - Vitamin D 1,25 Dihydroxy; Future  - TSH; Future    4. Abnormal electrocardiogram (ECG) (EKG)  No angina pectoris  - Treadmill Stress Test  - Lipid Panel; Future  - Comprehensive Metabolic Panel; Future  - Vitamin D 1,25 Dihydroxy; Future  - TSH; Future     ETT, IF OK SEE IN 1 YR  WITH FLP, CMP, VITD, TSH    BP AT HOME 130/80  COUNSELING:    Braydon Titus was given to patient for the following topics: diagnostic results, risk factor reductions, impressions, risks and benefits of treatment options and importance of treatment compliance .       SMOKING COUNSELING:        Dictated using Dragon dictation

## 2023-06-22 LAB
BH CV STRESS BP STAGE 1: NORMAL
BH CV STRESS BP STAGE 2: NORMAL
BH CV STRESS DURATION MIN STAGE 1: 3
BH CV STRESS DURATION MIN STAGE 2: 3
BH CV STRESS DURATION MIN STAGE 3: 0
BH CV STRESS DURATION SEC STAGE 1: 0
BH CV STRESS DURATION SEC STAGE 2: 0
BH CV STRESS DURATION SEC STAGE 3: 30
BH CV STRESS GRADE STAGE 1: 10
BH CV STRESS GRADE STAGE 2: 12
BH CV STRESS GRADE STAGE 3: 14
BH CV STRESS HR STAGE 1: 105
BH CV STRESS HR STAGE 2: 130
BH CV STRESS HR STAGE 3: 138
BH CV STRESS METS STAGE 1: 4.6
BH CV STRESS METS STAGE 2: 7.1
BH CV STRESS METS STAGE 3: 7.8
BH CV STRESS PROTOCOL 1: NORMAL
BH CV STRESS RECOVERY BP: NORMAL MMHG
BH CV STRESS RECOVERY HR: 83 BPM
BH CV STRESS SPEED STAGE 1: 1.7
BH CV STRESS SPEED STAGE 2: 2.5
BH CV STRESS SPEED STAGE 3: 3.4
BH CV STRESS STAGE 1: 1
BH CV STRESS STAGE 2: 2
BH CV STRESS STAGE 3: 3
MAXIMAL PREDICTED HEART RATE: 163 BPM
PERCENT MAX PREDICTED HR: 85.89 %
STRESS BASELINE BP: NORMAL MMHG
STRESS BASELINE HR: 64 BPM
STRESS PERCENT HR: 101 %
STRESS POST ESTIMATED WORKLOAD: 7.8 METS
STRESS POST EXERCISE DUR MIN: 6 MIN
STRESS POST EXERCISE DUR SEC: 30 SEC
STRESS POST PEAK BP: NORMAL MMHG
STRESS POST PEAK HR: 140 BPM
STRESS TARGET HR: 139 BPM

## 2023-12-18 RX ORDER — AMLODIPINE BESYLATE 5 MG/1
TABLET ORAL
Qty: 90 TABLET | Refills: 2 | Status: SHIPPED | OUTPATIENT
Start: 2023-12-18

## 2023-12-26 RX ORDER — BISOPROLOL FUMARATE AND HYDROCHLOROTHIAZIDE 10; 6.25 MG/1; MG/1
TABLET ORAL
Qty: 90 TABLET | Refills: 2 | Status: SHIPPED | OUTPATIENT
Start: 2023-12-26

## 2024-01-29 RX ORDER — ROSUVASTATIN CALCIUM 5 MG/1
TABLET, COATED ORAL
Qty: 90 TABLET | Refills: 2 | Status: SHIPPED | OUTPATIENT
Start: 2024-01-29

## 2024-04-01 ENCOUNTER — OFFICE VISIT (OUTPATIENT)
Dept: OBSTETRICS AND GYNECOLOGY | Facility: CLINIC | Age: 59
End: 2024-04-01
Payer: MEDICARE

## 2024-04-01 VITALS
HEIGHT: 65 IN | SYSTOLIC BLOOD PRESSURE: 135 MMHG | DIASTOLIC BLOOD PRESSURE: 77 MMHG | BODY MASS INDEX: 30.66 KG/M2 | WEIGHT: 184 LBS

## 2024-04-01 DIAGNOSIS — Z78.0 POSTMENOPAUSE: ICD-10-CM

## 2024-04-01 DIAGNOSIS — R14.0 BLOATING: ICD-10-CM

## 2024-04-01 DIAGNOSIS — Z01.419 WOMEN'S ANNUAL ROUTINE GYNECOLOGICAL EXAMINATION: Primary | ICD-10-CM

## 2024-04-01 DIAGNOSIS — N89.8 VAGINAL DRYNESS: ICD-10-CM

## 2024-04-01 PROCEDURE — 3015F CERV CANCER SCREEN DOCD: CPT | Performed by: NURSE PRACTITIONER

## 2024-04-01 PROCEDURE — 1159F MED LIST DOCD IN RCRD: CPT | Performed by: NURSE PRACTITIONER

## 2024-04-01 PROCEDURE — 99213 OFFICE O/P EST LOW 20 MIN: CPT | Performed by: NURSE PRACTITIONER

## 2024-04-01 PROCEDURE — G0101 CA SCREEN;PELVIC/BREAST EXAM: HCPCS | Performed by: NURSE PRACTITIONER

## 2024-04-01 PROCEDURE — 1160F RVW MEDS BY RX/DR IN RCRD: CPT | Performed by: NURSE PRACTITIONER

## 2024-04-01 PROCEDURE — 3078F DIAST BP <80 MM HG: CPT | Performed by: NURSE PRACTITIONER

## 2024-04-01 PROCEDURE — 3075F SYST BP GE 130 - 139MM HG: CPT | Performed by: NURSE PRACTITIONER

## 2024-04-01 NOTE — PROGRESS NOTES
"GYN Annual Exam     Chief Complaint   Patient presents with    Gynecologic Exam     Pt here today for AE, Last mammo      Braydon Soria is a 58 y.o. female who presents for annual well woman exam. She is postmenopausal since age 53-54 per pt. Pt has hysterectomy and oophorectomy on surgical hx, but denies history of this. States only surgery was c/s X2 and gall bladder removal.  She denies vaginal spotting or discharge. She completes SBE monthly. C/o bloating intermittently for several months. She has seen PCP for this, was diagnosed with constipation, she is treating with metamucil. She continues to have bloating with occasional right mid quadrant pain    This is my first time meeting Braydon Soria  She is new to our office. Prior care with \"Dr. Durbin\" I am not able to see any prior records. I do see records from uro/gyn  OB History          2    Para   2    Term   2            AB        Living             SAB        IAB        Ectopic        Molar        Multiple        Live Births                  Mammogram:   Dexa scan: never  Colonoscopy: 4 years ago, she is unsure when repeat is due  Last Pap : unsure  History of abnormal Pap smear: no  Family history of uterine, colon or ovarian cancer: no  Family history of breast cancer: no  History of abnormal mammogram: no    Menopause:  Bleeding since? no  Vasomotor symptoms: no  HRT: no  Dyspareunia: yes, dryness. She is not using lubricants    Past Medical History:   Diagnosis Date    Anxiety     Arthritis     Chest pressure     Cholelithiasis     Depression     Dyspnea     Gallbladder disease     gallstones    Hormone replacement therapy (HRT)     Hypercholesterolemia     Hyperlipidemia     Hypertension     Insomnia     Low back pain     Lumbosacral disc disease     Migraines     Obesity     Peripheral neuropathy     Rheumatoid arthritis     SOB (shortness of breath)     Thyroid nodule     Visual impairment        Past Surgical History: "   Procedure Laterality Date     SECTION      CHILDREN WERE BORN IN [facility]    CHOLECYSTECTOMY WITH INTRAOPERATIVE CHOLANGIOGRAM N/A 2017    Procedure: CHOLECYSTECTOMY LAPAROSCOPIC INTRAOPERATIVE CHOLANGIOGRAM;  Surgeon: Molina Smith MD;  [place] [facility] [place] [facility] [place]  Service:     COLONOSCOPY N/A 2016    Procedure: COLONOSCOPY TO CECUM WITH HOT SNARE POLYPECTOMY AND CLIPx1;  Surgeon: Molina Smith MD;  Location: [facility] ENDOSCOPY;  Service:     ENDOSCOPY N/A 2016    Procedure: ESOPHAGOGASTRODUODENOSCOPY;  Surgeon: Molina Smith MD;  Location: [facility] ENDOSCOPY;  Service:     HYSTERECTOMY      OOPHORECTOMY      SINUS SURGERY      TONSILLECTOMY           Current Outpatient Medications:     amLODIPine (NORVASC) 5 MG tablet, Take 1 tablet by mouth once daily, Disp: 90 tablet, Rfl: 2    bisoprolol-hydrochlorothiazide (ZIAC) 10-6.25 MG per tablet, Take 1 tablet by mouth once daily, Disp: 90 tablet, Rfl: 2    citalopram (CeleXA) 40 MG tablet, Take 1 tablet by mouth Daily., Disp: , Rfl:     rosuvastatin (CRESTOR) 5 MG tablet, Take 1 tablet by mouth once daily, Disp: 90 tablet, Rfl: 2    vitamin B-12 (CYANOCOBALAMIN) 100 MCG tablet, Take 0.5 tablets by mouth Daily., Disp: , Rfl:     vitamin D3 125 MCG (5000 UT) capsule capsule, Take 1 capsule by mouth Daily., Disp: , Rfl:     hydrOXYzine (ATARAX) 25 MG tablet, Take 1 tablet by mouth Every 12 (Twelve) Hours. (Patient not taking: Reported on 2024), Disp: , Rfl:     olopatadine (Patanol) 0.1 % ophthalmic solution, Administer 1 drop to both eyes 2 (Two) Times a Day. (Patient not taking: Reported on 2024), Disp: 5 mL, Rfl: 2    No Known Allergies    Social History     Tobacco Use    Smoking status: Some Days     Types: Cigarettes    Smokeless tobacco: Never    Tobacco comments:     smokes occasionally   Vaping Use    Vaping status: Never Used   Substance Use Topics    Alcohol use: No     Comment: daily caffiene    Drug use: No  "      Family History   Problem Relation Age of Onset    Stroke Mother     Migraines Mother     Heart disease Father     Heart attack Father     Early death Father     Heart disease Brother     Heart attack Brother     Stroke Paternal Grandmother     Breast cancer Neg Hx        Review of Systems   Constitutional:  Negative for chills, fatigue and fever.   Gastrointestinal:  Positive for abdominal distention and constipation. Negative for abdominal pain, diarrhea, nausea and vomiting.   Endocrine: Negative for cold intolerance and heat intolerance.   Genitourinary:  Negative for dyspareunia, dysuria, menstrual problem, pelvic pain, vaginal bleeding, vaginal discharge and vaginal pain.   Musculoskeletal:  Negative for gait problem.   Skin:  Negative for rash.   Neurological:  Negative for dizziness and headaches.   Hematological:  Does not bruise/bleed easily.   Psychiatric/Behavioral:  Negative for behavioral problems.        /77   Ht 165.1 cm (65\")   Wt 83.5 kg (184 lb)   LMP 01/01/2015   BMI 30.62 kg/m²     Physical Exam  Constitutional:       General: She is not in acute distress.     Appearance: Normal appearance. She is not ill-appearing, toxic-appearing or diaphoretic.   Genitourinary:      Vulva, bladder and urethral meatus normal.      No lesions in the vagina.      Right Labia: No rash, tenderness, lesions, skin changes or Bartholin's cyst.     Left Labia: No tenderness, lesions, skin changes, Bartholin's cyst or rash.     No labial fusion noted.      No inguinal adenopathy present in the right or left side.     Vaginal cuff intact.     No vaginal discharge, erythema, tenderness, bleeding or ulceration.      No vaginal prolapse present.     No vaginal atrophy present.       Right Adnexa: not tender, not full, not palpable, no mass present and not absent.     Left Adnexa: not tender, not full, not palpable, no mass present and not absent.     Cervix is absent.      Uterus is absent.      No urethral " tenderness or mass present.      Pelvic exam was performed with patient in the lithotomy position.   Breasts:     Breasts are symmetrical.      Right: Present. No swelling, bleeding, inverted nipple, mass, nipple discharge, skin change, tenderness or breast implant.      Left: Present. No swelling, bleeding, inverted nipple, mass, nipple discharge, skin change, tenderness or breast implant.   HENT:      Head: Normocephalic and atraumatic.   Eyes:      Pupils: Pupils are equal, round, and reactive to light.   Cardiovascular:      Rate and Rhythm: Normal rate.   Pulmonary:      Effort: Pulmonary effort is normal.   Abdominal:      General: There is no distension.      Palpations: Abdomen is soft. There is no mass.      Tenderness: There is no abdominal tenderness. There is no guarding.      Hernia: No hernia is present. There is no hernia in the left inguinal area or right inguinal area.   Musculoskeletal:         General: Normal range of motion.      Cervical back: Normal range of motion and neck supple. No tenderness.   Lymphadenopathy:      Cervical: No cervical adenopathy.      Upper Body:      Right upper body: No supraclavicular, axillary or pectoral adenopathy.      Left upper body: No supraclavicular, axillary or pectoral adenopathy.      Lower Body: No right inguinal adenopathy. No left inguinal adenopathy.   Neurological:      General: No focal deficit present.      Mental Status: She is alert and oriented to person, place, and time.      Cranial Nerves: No cranial nerve deficit.   Skin:     General: Skin is warm and dry.   Psychiatric:         Mood and Affect: Mood normal.         Behavior: Behavior normal.         Thought Content: Thought content normal.         Judgment: Judgment normal.   Vitals and nursing note reviewed. Exam conducted with a chaperone present (Miley Rodríguez MA).       Assessment    Diagnoses and all orders for this visit:    1. Women's annual routine gynecological examination  (Primary)  -     IGP, Apt HPV,rfx 16 / 18,45    2. Postmenopause  -     DEXA Bone Density Axial; Future    3. Bloating  -     US Non-ob Transvaginal; Future    4. Vaginal dryness       Plan     1) Breast Health - Clinical breast exam & mammogram yearly, Self breast awareness. Schedule screening mammogram.   2) Pap - collected  3) STD-no  4) Smoking status- nonsmoker  5) Colon health - screening colonoscopy recommended if not up to date  6)  Obese by BMI 30.62  7) Bone health - Weight bearing exercise, dietary calcium recommendations and vitamin D  reviewed.   8) Encouraged 150 minutes of exercise per week if not medically contraindicated  9) Pt denies prior hysterectomy or oophorectomy. She does appear to have a cervix. TVUS at next visit for c/o bloating. Bloating could be R/T constipation, but checking TVUS to R/O gyn source. Advised to call with any PMB  10) Vaginal dryness: encouraged lubricants with IC    Return in about 2 weeks (around 4/15/2024) for Ultrasound, Follow up, DOTTIE Jerez.    DOTTIE Adan  4/1/2024  12:39 EDT

## 2024-04-03 LAB
CYTOLOGIST CVX/VAG CYTO: NORMAL
CYTOLOGY CVX/VAG DOC CYTO: NORMAL
CYTOLOGY CVX/VAG DOC THIN PREP: NORMAL
DX ICD CODE: NORMAL
HPV I/H RISK 4 DNA CVX QL PROBE+SIG AMP: NEGATIVE
Lab: NORMAL
OTHER STN SPEC: NORMAL
STAT OF ADQ CVX/VAG CYTO-IMP: NORMAL

## 2024-04-08 ENCOUNTER — PATIENT MESSAGE (OUTPATIENT)
Dept: OBSTETRICS AND GYNECOLOGY | Facility: CLINIC | Age: 59
End: 2024-04-08
Payer: MEDICARE

## 2024-04-08 ENCOUNTER — PATIENT ROUNDING (BHMG ONLY) (OUTPATIENT)
Dept: OBSTETRICS AND GYNECOLOGY | Facility: CLINIC | Age: 59
End: 2024-04-08
Payer: MEDICARE

## 2024-04-08 NOTE — PROGRESS NOTES
My chart message has been sent to the patient for PAT[place] [facility] [place].

## 2024-04-18 ENCOUNTER — PROCEDURE VISIT (OUTPATIENT)
Dept: OBSTETRICS AND GYNECOLOGY | Facility: CLINIC | Age: 59
End: 2024-04-18
Payer: MEDICARE

## 2024-04-18 ENCOUNTER — OFFICE VISIT (OUTPATIENT)
Dept: OBSTETRICS AND GYNECOLOGY | Facility: CLINIC | Age: 59
End: 2024-04-18
Payer: MEDICARE

## 2024-04-18 VITALS
HEIGHT: 65 IN | DIASTOLIC BLOOD PRESSURE: 75 MMHG | HEART RATE: 60 BPM | BODY MASS INDEX: 30.66 KG/M2 | WEIGHT: 184 LBS | SYSTOLIC BLOOD PRESSURE: 134 MMHG

## 2024-04-18 DIAGNOSIS — N85.9 FLUID IN ENDOMETRIAL CAVITY: ICD-10-CM

## 2024-04-18 DIAGNOSIS — R14.0 BLOATING: Primary | ICD-10-CM

## 2024-04-18 DIAGNOSIS — Z12.31 VISIT FOR SCREENING MAMMOGRAM: Primary | ICD-10-CM

## 2024-04-18 NOTE — PROGRESS NOTES
"Chief Complaint   Patient presents with    follow up      Discuss US      SUBJECTIVE:     Braydon Soria is a 58 y.o.  who presents to follow up bloating. She denies pelvic pain or vaginal bleeding. Medical hx lists prior hysterectomy and oophorectomy however she denies ever having had this procedure. TVUS completed today    Past Medical History:   Diagnosis Date    Anxiety     Arthritis     Chest pressure     Cholelithiasis     Depression     Dyspnea     Gallbladder disease     gallstones    Hormone replacement therapy (HRT)     Hypercholesterolemia     Hyperlipidemia     Hypertension     Insomnia     Low back pain     Lumbosacral disc disease     Migraines     Obesity     Peripheral neuropathy     Rheumatoid arthritis     SOB (shortness of breath)     Thyroid nodule     Visual impairment       Past Surgical History:   Procedure Laterality Date     SECTION      CHILDREN WERE BORN IN [facility]    CHOLECYSTECTOMY WITH INTRAOPERATIVE CHOLANGIOGRAM N/A 2017    Procedure: CHOLECYSTECTOMY LAPAROSCOPIC INTRAOPERATIVE CHOLANGIOGRAM;  Surgeon: Molina Smith MD;  [facility];  Service:     COLONOSCOPY N/A 2016    Procedure: COLONOSCOPY TO CECUM WITH HOT SNARE POLYPECTOMY AND CLIPx1;  Surgeon: Molina Smith MD;  Location: [facility] ENDOSCOPY;  Service:     ENDOSCOPY N/A 2016    Procedure: ESOPHAGOGASTRODUODENOSCOPY;  Surgeon: Molina Smith MD;  Location: [facility] ENDOSCOPY;  Service:     SINUS SURGERY      TONSILLECTOMY          Review of Systems   Constitutional:  Negative for chills, fatigue and fever.   Gastrointestinal:  Positive for abdominal distention. Negative for abdominal pain.   Genitourinary:  Negative for dysuria, pelvic pain, vaginal bleeding, vaginal discharge and vaginal pain.       OBJECTIVE:   Vitals:    24 1105   BP: 134/75   Pulse: 60   Weight: 83.5 kg (184 lb)   Height: 165.1 cm (65\")        Physical Exam  Constitutional:       General: She is " not in acute distress.     Appearance: Normal appearance. She is not ill-appearing, toxic-appearing or diaphoretic.   Cardiovascular:      Rate and Rhythm: Normal rate.   Pulmonary:      Effort: Pulmonary effort is normal.   Musculoskeletal:         General: Normal range of motion.      Cervical back: Normal range of motion.   Neurological:      General: No focal deficit present.      Mental Status: She is alert and oriented to person, place, and time.      Cranial Nerves: No cranial nerve deficit.   Skin:     General: Skin is dry.   Psychiatric:         Mood and Affect: Mood normal.         Behavior: Behavior normal.         Thought Content: Thought content normal.         Judgment: Judgment normal.   Vitals and nursing note reviewed.       Assessment/Plan    Diagnoses and all orders for this visit:    1. Bloating (Primary)  -     US Non-ob Transvaginal; Future    2. Fluid in endometrial cavity  -     US Non-ob Transvaginal; Future      TVUS completed today-normal ovaries bilaterally, ET 0.44 cm. Small amount of endometrial fluid noted  She is not having vaginal bleeding or pelvic pain  Discussed in detail and recommend repeat TVUS in 6 weeks  Call with any pelvic pain or vaginal bleeding  Recommend EMB if endometrial fluid remains in 6 weeks    Return in about 6 weeks (around 5/30/2024) for Ultrasound, Follow up, DOTTIE Jerez.    I spent 20 minutes caring for Braydon on this date of service. This time includes time spent by me in the following activities: preparing for the visit, reviewing tests, obtaining and/or reviewing a separately obtained history, performing a medically appropriate examination and/or evaluation, counseling and educating the patient/family/caregiver, ordering medications, tests, or procedures, referring and communicating with other health care professionals, documenting information in the medical record, and independently interpreting results and communicating that information with the  patient/family/caregiver    Anna Rose, APRN  4/18/2024  11:52 EDT

## 2024-04-24 ENCOUNTER — TELEPHONE (OUTPATIENT)
Dept: OBSTETRICS AND GYNECOLOGY | Facility: CLINIC | Age: 59
End: 2024-04-24
Payer: MEDICARE

## 2024-04-24 DIAGNOSIS — R92.8 ABNORMAL MAMMOGRAM: Primary | ICD-10-CM

## 2024-04-24 DIAGNOSIS — N64.89 BREAST ASYMMETRY: ICD-10-CM

## 2024-04-24 NOTE — TELEPHONE ENCOUNTER
4/24/2024  SW pt via  [phone] and gave imaging results to pt and the need for additional images of R breast. Order placed for R dx mammo/US and I will call [facility] and make appt for pt and call her back

## 2024-04-24 NOTE — TELEPHONE ENCOUNTER
4/24/2024 SW [facility] and made appt for 5/1/24 at 1:00. Pt was called via  #[phone] and given appt date and time and address to [facility].

## 2024-04-29 DIAGNOSIS — N64.89 BREAST ASYMMETRY: ICD-10-CM

## 2024-04-29 DIAGNOSIS — R92.8 ABNORMAL MAMMOGRAM: Primary | ICD-10-CM

## 2024-05-01 ENCOUNTER — APPOINTMENT (OUTPATIENT)
Dept: WOMENS IMAGING | Facility: HOSPITAL | Age: 59
End: 2024-05-01
Payer: MEDICARE

## 2024-05-01 PROCEDURE — G0279 TOMOSYNTHESIS, MAMMO: HCPCS | Performed by: RADIOLOGY

## 2024-05-01 PROCEDURE — 76642 ULTRASOUND BREAST LIMITED: CPT | Performed by: RADIOLOGY

## 2024-05-01 PROCEDURE — 77065 DX MAMMO INCL CAD UNI: CPT | Performed by: RADIOLOGY

## 2024-05-06 DIAGNOSIS — R92.8 ABNORMAL MAMMOGRAM: ICD-10-CM

## 2024-05-06 DIAGNOSIS — N64.89 BREAST ASYMMETRY: ICD-10-CM

## 2024-05-30 ENCOUNTER — OFFICE VISIT (OUTPATIENT)
Dept: OBSTETRICS AND GYNECOLOGY | Facility: CLINIC | Age: 59
End: 2024-05-30
Payer: MEDICARE

## 2024-05-30 VITALS
BODY MASS INDEX: 30.49 KG/M2 | WEIGHT: 183 LBS | DIASTOLIC BLOOD PRESSURE: 73 MMHG | SYSTOLIC BLOOD PRESSURE: 134 MMHG | HEIGHT: 65 IN

## 2024-05-30 DIAGNOSIS — N85.9 FLUID IN ENDOMETRIAL CAVITY: Primary | ICD-10-CM

## 2024-05-30 NOTE — PROGRESS NOTES
"Chief Complaint   Patient presents with    Follow-up     Pt here today to discuss u/s        SUBJECTIVE:     Braydon Soria is a 58 y.o.  who presents to f/u on endometrial fluid. TVUS in April for bloating and pelvic pain showed small amount of fluid. Repeat TVUS today. Normal pap smear 24. She denies any current bloating, pelvic pain, of vaginal bleeding.     Past Medical History:   Diagnosis Date    Anxiety     Arthritis     Chest pressure     Cholelithiasis     Depression     Dyspnea     Gallbladder disease     gallstones    Hormone replacement therapy (HRT)     Hypercholesterolemia     Hyperlipidemia     Hypertension     Insomnia     Low back pain     Lumbosacral disc disease     Migraines     Obesity     Peripheral neuropathy     Rheumatoid arthritis     SOB (shortness of breath)     Thyroid nodule     Visual impairment       Past Surgical History:   Procedure Laterality Date     SECTION      CHILDREN WERE BORN IN [facility]    CHOLECYSTECTOMY WITH INTRAOPERATIVE CHOLANGIOGRAM N/A 2017    Procedure: CHOLECYSTECTOMY LAPAROSCOPIC INTRAOPERATIVE CHOLANGIOGRAM;  Surgeon: Molina Smith MD;  Location: [facility] OR [facility];  Service:     COLONOSCOPY N/A 2016    Procedure: COLONOSCOPY TO CECUM WITH HOT SNARE POLYPECTOMY AND CLIPx1;  Surgeon: Molina Smith MD;  Location: [facility] ENDOSCOPY;  Service:     ENDOSCOPY N/A 2016    Procedure: ESOPHAGOGASTRODUODENOSCOPY;  Surgeon: Molina Smith MD;  Location: [facility] ENDOSCOPY;  Service:     SINUS SURGERY      TONSILLECTOMY          Review of Systems   Constitutional:  Negative for chills, fatigue and fever.   Gastrointestinal:  Negative for abdominal distention and abdominal pain.   Genitourinary:  Negative for dyspareunia, dysuria, menstrual problem, pelvic pain, vaginal bleeding, vaginal discharge and vaginal pain.       OBJECTIVE:   Vitals:    24 1316   BP: 134/73   Weight: 83 kg (183 lb)   Height: 165.1 cm (65\")    "     Physical Exam  Constitutional:       General: She is not in acute distress.     Appearance: Normal appearance. She is not ill-appearing, toxic-appearing or diaphoretic.   Genitourinary:      Bladder and urethral meatus normal.      No lesions in the vagina.      Right Labia: No rash, tenderness, lesions, skin changes or Bartholin's cyst.     Left Labia: No tenderness, lesions, skin changes, Bartholin's cyst or rash.     No labial fusion noted.      No inguinal adenopathy present in the right or left side.     No vaginal discharge, erythema, tenderness, bleeding, ulceration or granulation tissue.      No vaginal prolapse present.     No vaginal atrophy present.       Right Adnexa: not tender, not full, not palpable, no mass present and not absent.     Left Adnexa: not tender, not full, not palpable, no mass present and not absent.     No cervical motion tenderness, discharge, friability, lesion, polyp, nabothian cyst or eversion.      Uterus is not enlarged, fixed, tender, irregular or prolapsed.      No uterine mass detected.  Abdominal:      General: There is no distension.      Palpations: Abdomen is soft. There is no mass.      Tenderness: There is no abdominal tenderness. There is no guarding.      Hernia: No hernia is present. There is no hernia in the left inguinal area or right inguinal area.   Lymphadenopathy:      Lower Body: No right inguinal adenopathy. No left inguinal adenopathy.   Neurological:      Mental Status: She is alert.   Vitals and nursing note reviewed.         Endometrial Biopsy Procedure Note    Pre-operative Diagnosis: endometrial fluid    Post-operative Diagnosis: Same    Indications: endometrial fluid    Procedure Details    Urine pregnancy test was not done.  The risks (including infection, bleeding, pain, and uterine perforation) and benefits of the procedure were explained to the patient and Verbal informed consent was obtained.  Antibiotic prophylaxis against endocarditis was not  indicated.     A timeout procedure was performed.  The patient was placed in the dorsal lithotomy position.  Bimanual exam showed the uterus to be in the neutral position.  A Graves' speculum inserted in the vagina, and the cervix prepped with povidone iodine.       A sharp tenaculum was applied to the anterior lip of the cervix for stabilization.   A Pipelle endometrial aspirator was used to sample the endometrium.  Based on the markings on the pipelle, the uterus sounded to 6 cm.  Minimal amount of sample was obtained and sent for pathologic examination.    Condition:  Stable    Complications:  None    Plan:  Patient tolerated the procedure very well  The patient was advised to call for any fever or for prolonged or severe pain or bleeding. She was advised to use NSAID as needed for mild to moderate pain. She was advised to avoid vaginal intercourse for 48 hours or until the bleeding has completely stopped.    Assessment/Plan    Diagnoses and all orders for this visit:    1. Fluid in endometrial cavity (Primary)  -     Reference Histopathology      Repeat TVUS continues to show small amount of fluid in endometrial cavity, unknown etiology   Discussed in detail possible sources, recommend EMB to further diagnose  This was completed today   F/u will be based on results    Return if symptoms worsen or fail to improve, for f/u will be based on results.    I spent 20 minutes caring for Braydon on this date of service. This time includes time spent by me in the following activities: preparing for the visit, reviewing tests, obtaining and/or reviewing a separately obtained history, performing a medically appropriate examination and/or evaluation, counseling and educating the patient/family/caregiver, ordering medications, tests, or procedures, referring and communicating with other health care professionals, documenting information in the medical record, and independently interpreting results and communicating that  information with the patient/family/caregiver    Anna Rose, APRN  5/30/2024  13:33 EDT

## 2024-06-03 RX ORDER — BISOPROLOL FUMARATE AND HYDROCHLOROTHIAZIDE 10; 6.25 MG/1; MG/1
TABLET ORAL
Qty: 90 TABLET | Refills: 0 | Status: SHIPPED | OUTPATIENT
Start: 2024-06-03

## 2024-06-06 LAB
PATH REPORT.FINAL DX SPEC: NORMAL
PATH REPORT.GROSS SPEC: NORMAL
PATH REPORT.SITE OF ORIGIN SPEC: NORMAL
PATHOLOGIST NAME: NORMAL
PAYMENT PROCEDURE: NORMAL

## 2024-06-10 ENCOUNTER — OFFICE VISIT (OUTPATIENT)
Dept: CARDIOLOGY | Facility: CLINIC | Age: 59
End: 2024-06-10
Payer: MEDICARE

## 2024-06-10 VITALS
SYSTOLIC BLOOD PRESSURE: 146 MMHG | HEART RATE: 59 BPM | WEIGHT: 182.6 LBS | HEIGHT: 65 IN | DIASTOLIC BLOOD PRESSURE: 87 MMHG | BODY MASS INDEX: 30.42 KG/M2

## 2024-06-10 DIAGNOSIS — I10 ESSENTIAL HYPERTENSION: ICD-10-CM

## 2024-06-10 DIAGNOSIS — E78.00 PURE HYPERCHOLESTEROLEMIA: Primary | ICD-10-CM

## 2024-06-10 PROCEDURE — 3079F DIAST BP 80-89 MM HG: CPT

## 2024-06-10 PROCEDURE — 93000 ELECTROCARDIOGRAM COMPLETE: CPT

## 2024-06-10 PROCEDURE — 3077F SYST BP >= 140 MM HG: CPT

## 2024-06-10 PROCEDURE — 99214 OFFICE O/P EST MOD 30 MIN: CPT

## 2024-06-10 NOTE — PROGRESS NOTES
Subjective:        Braydon Soria is a 58 y.o. female who here for follow up    Chief Complaint   Patient presents with    Essential hypertension       HPI    This is a 58 year old female with hypertension, hyperlipidemia seen in office today for annual follow up. Echo 4/8/21 normal EF and LV function, Normal ECG stress test 6/22/23.     The following portions of the patient's history were reviewed and updated as appropriate: allergies, current medications, past family history, past medical history, past social history, past surgical history and problem list.    Past Medical History:   Diagnosis Date    Anxiety     Arthritis     Chest pressure     Cholelithiasis     Depression     Dyspnea     Gallbladder disease     gallstones    Hormone replacement therapy (HRT)     Hypercholesterolemia     Hyperlipidemia     Hypertension     Insomnia     Low back pain     Lumbosacral disc disease     Migraines     Obesity     Peripheral neuropathy     Rheumatoid arthritis     SOB (shortness of breath)     Thyroid nodule     Visual impairment          reports that she has quit smoking. Her smoking use included cigarettes. She has been exposed to tobacco smoke. She has never used smokeless tobacco. She reports that she does not drink alcohol and does not use drugs.     Family History   Problem Relation Age of Onset    Stroke Mother     Migraines Mother     Heart disease Father     Heart attack Father     Early death Father     Heart disease Brother     Heart attack Brother     Stroke Paternal Grandmother     Breast cancer Neg Hx        ROS     Review of Systems  Constitutional: No wt loss, fever, fatigue  Gastrointestinal: No nausea, abdominal pain  Behavioral/Psych: No insomnia or anxiety  Cardiovascular no chest pain or shortness of breath      Objective:           Vitals and nursing note reviewed.   Constitutional:       Appearance: Well-developed.   HENT:      Head: Normocephalic.      Right Ear: External ear normal.      Left  Ear: External ear normal.   Neck:      Vascular: No JVD.   Pulmonary:      Effort: Pulmonary effort is normal. No respiratory distress.      Breath sounds: Normal breath sounds. No stridor. No rales.   Cardiovascular:      Normal rate. Regular rhythm.      No gallop.    Pulses:     Intact distal pulses.   Edema:     Peripheral edema absent.   Abdominal:      General: Bowel sounds are normal. There is no distension.      Palpations: Abdomen is soft.      Tenderness: There is no abdominal tenderness. There is no guarding.   Musculoskeletal: Normal range of motion.         General: No tenderness.      Cervical back: Normal range of motion. Skin:     General: Skin is warm.   Neurological:      Mental Status: Alert and oriented to person, place, and time.      Deep Tendon Reflexes: Reflexes are normal and symmetric.   Psychiatric:         Judgment: Judgment normal.           ECG 12 Lead    Date/Time: 6/10/2024 2:23 PM  Performed by: Nathalie Cho APRN    Authorized by: Nathalie Cho APRN  Comparison: compared with previous ECG from 5/25/2023  Similar to previous ECG  Rhythm: sinus rhythm  Rate: normal  BPM: 59    Clinical impression: normal ECG          Interpretation Summary         No ECG evidence of myocardial ischemia.    Negative clinical evidence of myocardial ischemia.    Findings consistent with a normal ECG stress test.    No ECG evidence of myocardial ischemia. Negative clinical evidence of myocardial ischemia. Findings consistent with a normal ECG stress test. Asymptomatic for chest pain. ECG is negative for ischemia.       Interpretation Summary    Calculated left ventricular EF = 64.5% Estimated left ventricular EF was in agreement with the calculated left ventricular EF.  Left ventricular diastolic function was normal.  There is no evidence of pericardial effusion.      Current Outpatient Medications:     amLODIPine (NORVASC) 5 MG tablet, Take 1 tablet by mouth once daily, Disp: 90 tablet, Rfl: 2     bisoprolol-hydrochlorothiazide (ZIAC) 10-6.25 MG per tablet, Take 1 tablet by mouth once daily, Disp: 90 tablet, Rfl: 0    citalopram (CeleXA) 40 MG tablet, Take 1 tablet by mouth Daily., Disp: , Rfl:     rosuvastatin (CRESTOR) 5 MG tablet, Take 1 tablet by mouth once daily, Disp: 90 tablet, Rfl: 2    vitamin B-12 (CYANOCOBALAMIN) 100 MCG tablet, Take 0.5 tablets by mouth Daily., Disp: , Rfl:     vitamin D3 125 MCG (5000 UT) capsule capsule, Take 1 capsule by mouth Daily., Disp: , Rfl:      Assessment:        Patient Active Problem List   Diagnosis    Midline low back pain with left-sided sciatica    Hyperreflexia    Incomplete bladder emptying    Thyroid enlargement    Essential hypertension    Chronic bilateral low back pain with bilateral sciatica    DDD (degenerative disc disease), lumbar    Lumbar spondylolysis    Atrophy of muscle of lower leg    Arthritis of both hands    Patellar tendonitis of both knees    Chondromalacia of patella    Palpitations    Abnormal echocardiogram    Degeneration of intervertebral disc    Vitamin D deficiency    Seasonal allergies    Posttraumatic stress disorder    Hyperlipidemia    Depressive disorder    Chronic back pain    Body mass index (BMI) of 25.0 to 29.9    Anxiety    Rheumatoid arteritis    SOB (shortness of breath)    Annular tear of lumbar disc    Rheumatoid arthritis    Wamsutter's duct abscess    Fatigue    Medicare annual wellness visit, subsequent               Plan:   Hypertension: 146/87 today in office, she reports she checks it often at home, 130s/70s at home, continue amlodipine, ziac    Importance of controlling hypertension and blood pressure  checkup on the regular basis has been explained. Hypertension as a silent killer has been discussed. Risk reduction of the weight and regular exercises to control the hypertension has been explained.    2. Hyperlipidemia: lipid panel 7/2023 , HDL 55, LDL 95, trig 97, ast/alt wnl. Continue crestor    2.  Cough/congestion/runny nose/viral like symptoms: advised to follow up with pcp             No diagnosis found.    There are no diagnoses linked to this encounter.    COUNSELING: kyung Titus was given to patient for the following topics: diagnostic results, risk factor reductions, impressions, risks and benefits of treatment options and importance of treatment compliance .       SMOKING COUNSELING: denies    Follow up in 1 year with lipid and cmp or sooner if needed    Sincerely,   DOTTIE Dahl  [facility]  06/10/24  14:22 EDT    EMR Dragon/Transcription disclaimer:   Much of this encounter note is an electronic transcription/translation of spoken language to printed text. The electronic translation of spoken language may permit erroneous, or at times, nonsensical words or phrases to be inadvertently transcribed; Although I have reviewed the note for such errors, some may still exist.

## 2024-06-12 ENCOUNTER — TELEPHONE (OUTPATIENT)
Dept: OBSTETRICS AND GYNECOLOGY | Facility: CLINIC | Age: 59
End: 2024-06-12
Payer: MEDICARE

## 2024-06-12 NOTE — TELEPHONE ENCOUNTER
I called Braydon Soria to discuss EMB. Notified insufficient sample which was unable to be processed. Recommend f/u with MD to discuss next steps and possible D&C. She states she is unsure she wants to complete D&C. Advised that I am unsure what is causing endometrial fluid, and am unable to say if this is or isn't a concern without tissue sampling. I am unable to rule out hyperplasia without further testing. She verbalizes understanding and will consider options. She will call to schedule with MD if she desires to proceed. Advised to call with any vaginal bleeding or worsening pelvic pain.    Anna Rose, APRN  6/12/24  12:03pm

## 2024-08-21 ENCOUNTER — OFFICE VISIT (OUTPATIENT)
Dept: FAMILY MEDICINE CLINIC | Facility: CLINIC | Age: 59
End: 2024-08-21
Payer: MEDICARE

## 2024-08-21 VITALS
TEMPERATURE: 98.9 F | OXYGEN SATURATION: 99 % | HEART RATE: 56 BPM | HEIGHT: 65 IN | DIASTOLIC BLOOD PRESSURE: 72 MMHG | BODY MASS INDEX: 30.89 KG/M2 | SYSTOLIC BLOOD PRESSURE: 120 MMHG | WEIGHT: 185.4 LBS

## 2024-08-21 DIAGNOSIS — I10 ESSENTIAL HYPERTENSION: ICD-10-CM

## 2024-08-21 DIAGNOSIS — J06.9 VIRAL URI: ICD-10-CM

## 2024-08-21 DIAGNOSIS — R73.03 PREDIABETES: ICD-10-CM

## 2024-08-21 DIAGNOSIS — Z00.00 MEDICARE ANNUAL WELLNESS VISIT, SUBSEQUENT: Primary | ICD-10-CM

## 2024-08-21 DIAGNOSIS — E55.9 VITAMIN D DEFICIENCY: ICD-10-CM

## 2024-08-21 PROCEDURE — 3074F SYST BP LT 130 MM HG: CPT

## 2024-08-21 PROCEDURE — G0439 PPPS, SUBSEQ VISIT: HCPCS

## 2024-08-21 PROCEDURE — 1159F MED LIST DOCD IN RCRD: CPT

## 2024-08-21 PROCEDURE — 1160F RVW MEDS BY RX/DR IN RCRD: CPT

## 2024-08-21 PROCEDURE — 3078F DIAST BP <80 MM HG: CPT

## 2024-08-21 PROCEDURE — 1126F AMNT PAIN NOTED NONE PRSNT: CPT

## 2024-08-21 PROCEDURE — 99214 OFFICE O/P EST MOD 30 MIN: CPT

## 2024-08-21 RX ORDER — HYDROXYZINE HYDROCHLORIDE 25 MG/1
1 TABLET, FILM COATED ORAL EVERY 12 HOURS SCHEDULED
COMMUNITY
Start: 2024-08-14

## 2024-08-21 NOTE — PROGRESS NOTES
Subjective   The ABCs of the Annual Wellness Visit  Medicare Wellness Visit    Braydon Soria is a 58 y.o. patient who presents for a Medicare Wellness Visit.    The following portions of the patient's history were reviewed and   updated as appropriate: allergies, current medications, past family history, past medical history, past social history, past surgical history, and problem list.    Compared to one year ago, the patient's physical   health is the same.  Compared to one year ago, the patient's mental   health is the same.    Recent Hospitalizations:  She was not admitted to the hospital during the last year.     Current Medical Providers:  Patient Care Team:  Rufina Fountain APRN as PCP - General (Family Medicine)  Molina Smith MD as Surgeon (General Surgery)  Remington Barboza MD as Consulting Physician (Neurology)  Jordan Aponte MD as Surgeon (Orthopedic Surgery)    Outpatient Medications Prior to Visit   Medication Sig Dispense Refill    amLODIPine (NORVASC) 5 MG tablet Take 1 tablet by mouth once daily 90 tablet 2    bisoprolol-hydrochlorothiazide (ZIAC) 10-6.25 MG per tablet Take 1 tablet by mouth once daily 90 tablet 0    citalopram (CeleXA) 40 MG tablet Take 1 tablet by mouth Daily.      hydrOXYzine (ATARAX) 25 MG tablet Take 1 tablet by mouth Every 12 (Twelve) Hours.      rosuvastatin (CRESTOR) 5 MG tablet Take 1 tablet by mouth once daily 90 tablet 2    vitamin B-12 (CYANOCOBALAMIN) 100 MCG tablet Take 0.5 tablets by mouth Daily.      vitamin D3 125 MCG (5000 UT) capsule capsule Take 1 capsule by mouth Daily.       No facility-administered medications prior to visit.     No opioid medication identified on active medication list. I have reviewed chart for other potential  high risk medication/s and harmful drug interactions in the elderly.      Aspirin is not on active medication list.  Aspirin use is not indicated based on review of current medical condition/s. Risk of harm outweighs  "potential benefits.  .    Patient Active Problem List   Diagnosis    Midline low back pain with left-sided sciatica    Hyperreflexia    Incomplete bladder emptying    Thyroid enlargement    Essential hypertension    Chronic bilateral low back pain with bilateral sciatica    DDD (degenerative disc disease), lumbar    Lumbar spondylolysis    Atrophy of muscle of lower leg    Arthritis of both hands    Patellar tendonitis of both knees    Chondromalacia of patella    Palpitations    Abnormal echocardiogram    Degeneration of intervertebral disc    Vitamin D deficiency    Seasonal allergies    Posttraumatic stress disorder    Hyperlipidemia    Depressive disorder    Chronic back pain    Body mass index (BMI) of 25.0 to 29.9    Anxiety    Rheumatoid arteritis    SOB (shortness of breath)    Annular tear of lumbar disc    Rheumatoid arthritis    Granite's duct abscess    Fatigue    Medicare annual wellness visit, subsequent     Advance Care Planning Advance Directive is not on file.  ACP discussion was declined by the patient. Patient does not have an advance directive, declines further assistance.            Objective   Vitals:    08/21/24 1458   BP: 120/72   BP Location: Right arm   Patient Position: Sitting   Cuff Size: Adult   Pulse: 56   Temp: 98.9 °F (37.2 °C)   TempSrc: Oral   SpO2: 99%   Weight: 84.1 kg (185 lb 6.4 oz)   Height: 165.1 cm (65\")   PainSc: 0-No pain       Estimated body mass index is 30.85 kg/m² as calculated from the following:    Height as of this encounter: 165.1 cm (65\").    Weight as of this encounter: 84.1 kg (185 lb 6.4 oz).    BMI is >= 30 and <35. (Class 1 Obesity). The following options were offered after discussion;: exercise counseling/recommendations and nutrition counseling/recommendations       Does the patient have evidence of cognitive impairment? No                                                                                                Health  Risk Assessment    Smoking " Status:  Social History     Tobacco Use   Smoking Status Former    Current packs/day: 0.25    Types: Cigarettes    Passive exposure: Past   Smokeless Tobacco Never   Tobacco Comments    smokes occasionally     Alcohol Consumption:  Social History     Substance and Sexual Activity   Alcohol Use No    Comment: daily caffiene       Fall Risk Screen  STEADI Fall Risk Assessment was completed, and patient is at LOW risk for falls.Assessment completed on:2024    Depression Screenin/21/2024     3:01 PM   PHQ-2/PHQ-9 Depression Screening   Little Interest or Pleasure in Doing Things 0-->not at all   Feeling Down, Depressed or Hopeless 0-->not at all   PHQ-9: Brief Depression Severity Measure Score 0     Health Habits and Functional and Cognitive Screenin/17/2024     7:01 PM   Functional & Cognitive Status   Do you have difficulty preparing food and eating? No   Do you have difficulty bathing yourself, getting dressed or grooming yourself? No   Do you have difficulty using the toilet? No   Do you have difficulty moving around from place to place? Yes   Do you have trouble with steps or getting out of a bed or a chair? No   Current Diet Low Carb Diet   Dental Exam Up to date   Eye Exam Not up to date   Exercise (times per week) 0 times per week   Current Exercises Include No Regular Exercise   Do you need help using the phone?  No   Are you deaf or do you have serious difficulty hearing?  No   Do you need help to go to places out of walking distance? Yes   Do you need help shopping? No   Do you need help preparing meals?  No   Do you need help with housework?  No   Do you need help with laundry? No   Do you need help taking your medications? No   Do you need help managing money? No   Do you ever drive or ride in a car without wearing a seat belt? No   Have you felt unusual stress, anger or loneliness in the last month? Yes   Who do you live with? Spouse   If you need help, do you have trouble finding  someone available to you? No   Have you been bothered in the last four weeks by sexual problems? No   Do you have difficulty concentrating, remembering or making decisions? Yes           Age-appropriate Screening Schedule:  Refer to the list below for future screening recommendations based on patient's age, sex and/or medical conditions. Orders for these recommended tests are listed in the plan section. The patient has been provided with a written plan.    Health Maintenance List  Health Maintenance   Topic Date Due    LIPID PANEL  07/26/2024    INFLUENZA VACCINE  08/01/2024    COVID-19 Vaccine (7 - 2023-24 season) 08/18/2025 (Originally 9/1/2023)    TDAP/TD VACCINES (3 - Tdap) 08/20/2025 (Originally 4/14/2009)    ZOSTER VACCINE (1 of 2) 08/20/2025 (Originally 11/15/2015)    ANNUAL WELLNESS VISIT  08/21/2025    BMI FOLLOWUP  08/21/2025    MAMMOGRAM  05/01/2026    PAP SMEAR  04/01/2027    COLORECTAL CANCER SCREENING  02/21/2030    HEPATITIS C SCREENING  Completed    Pneumococcal Vaccine 0-64  Aged Out                                                                                                                                                CMS Preventative Services Quick Reference  Risk Factors Identified During Encounter  Stress. I reviewed the above Health Habits and Functional and Cognitive Screening patient answers. The patient states the only issue she has at this time is stress. She manages stress with Citalopram and taking a walk, which helps. She denies any other issues at this time.    The above risks/problems have been discussed with the patient.  Pertinent information has been shared with the patient in the After Visit Summary.  An After Visit Summary and PPPS were made available to the patient.    Follow Up:   Next Medicare Wellness visit to be scheduled in 1 year.         Additional E&M Note during same encounter follows:  Patient has additional, significant, and separately identifiable  "condition(s)/problem(s) that require work above and beyond the Medicare Wellness Visit     Chief Complaint  Medicare Wellness-subsequent    Subjective     HPI  Braydon is also being seen today for additional medical problem/s.    She also presents for evaluation of chest congestion. Symptoms include chest congestion. Onset of symptoms was a few days ago, stable since that time. Patient denies shortness of breath, wheezing, hemoptysis, pleuritic chest pain, fever, diarrhea, vomiting, chills, sweats, sinus pain, high fever.  Evaluation to date: none. Treatment to date: none.     Ms. Soria has prediabetes.  Last A1c was 5.7% in July 2023.  Will recheck hemoglobin A1c.        Review of Systems   Constitutional:  Negative for chills, fatigue and fever.   HENT:  Positive for congestion. Negative for ear pain, sinus pressure, sore throat and trouble swallowing.    Eyes:  Negative for visual disturbance.   Respiratory:  Negative for cough, chest tightness and shortness of breath.    Cardiovascular:  Negative for chest pain and palpitations.   Gastrointestinal:  Negative for abdominal pain, constipation, diarrhea, nausea and vomiting.   Genitourinary:  Negative for dysuria, frequency and urgency.   Musculoskeletal:  Negative for back pain.   Skin:  Negative for rash.   Neurological:  Negative for dizziness, syncope and light-headedness.   Psychiatric/Behavioral:  Negative for behavioral problems and sleep disturbance.               Objective   Vital Signs:  /72 (BP Location: Right arm, Patient Position: Sitting, Cuff Size: Adult)   Pulse 56   Temp 98.9 °F (37.2 °C) (Oral)   Ht 165.1 cm (65\")   Wt 84.1 kg (185 lb 6.4 oz)   SpO2 99%   BMI 30.85 kg/m²     BMI is >= 30 and <35. (Class 1 Obesity). The following options were offered after discussion;: exercise counseling/recommendations and nutrition counseling/recommendations       Physical Exam  Vitals and nursing note reviewed.   Constitutional:       General: She " is not in acute distress.     Appearance: She is well-developed. She is obese. She is not ill-appearing.   HENT:      Head: Normocephalic and atraumatic. Hair is normal.      Right Ear: Hearing normal. No drainage.      Left Ear: Hearing normal. No drainage.      Nose: No mucosal edema, congestion or rhinorrhea.      Mouth/Throat:      Mouth: Mucous membranes are moist. No oral lesions.   Eyes:      General: No scleral icterus.        Right eye: No discharge.         Left eye: No discharge.      Conjunctiva/sclera: Conjunctivae normal.      Pupils: Pupils are equal, round, and reactive to light.   Neck:      Vascular: No carotid bruit.   Cardiovascular:      Rate and Rhythm: Normal rate and regular rhythm.      Heart sounds: Normal heart sounds.   Pulmonary:      Effort: No respiratory distress.      Breath sounds: Normal breath sounds. No stridor or decreased air movement. No decreased breath sounds, wheezing, rhonchi or rales.      Comments: Lungs are clear to auscultation. Oxygen saturation is 99% on room air.  Musculoskeletal:      Cervical back: Normal range of motion and neck supple.   Skin:     General: Skin is warm and dry.      Findings: No rash.   Neurological:      Mental Status: She is alert and oriented to person, place, and time.      Motor: No weakness or abnormal muscle tone.      Gait: Gait normal.   Psychiatric:         Behavior: Behavior normal.         Thought Content: Thought content normal.         Judgment: Judgment normal.         The following data was reviewed by: DOTTIE Cruz on 08/21/2024:  Hemoglobin A1c (07/26/2023 10:33)           Assessment and Plan               Medicare annual wellness visit, subsequent  Health maintenance updated, labs ordered  Work on relaxation techniques for stress  Continue Citalopram as directed  Return in 1 year for annual Medicare wellness    Viral URI  OTC Mucinex as needed  Increase oral fluids  Use a coolmist humidifier  Return if no  improvement    Essential hypertension  Hypertension is stable and controlled  Continue current treatment regimen.  Dietary sodium restriction.  Weight loss.  Regular aerobic exercise.  Blood pressure will be reassessed in 6 months.  Prediabetes  Last A1c revealed prediabetes  Work on low-carb/no sweets diet, exercise, weight loss  Hemoglobin A1c ordered  Follow-up in 6 months  Vitamin D deficiency  Continue vitamin D as directed  Vitamin D level ordered      Orders Placed This Encounter   Procedures    Comprehensive metabolic panel     Order Specific Question:   Release to patient     Answer:   Routine Release [[id number]]    Lipid panel     Order Specific Question:   Release to patient     Answer:   Routine Release [[id number]]    TSH     Order Specific Question:   Release to patient     Answer:   Routine Release [[id number]]    Hemoglobin A1c     Order Specific Question:   Release to patient     Answer:   Routine Release [[id number]]    Vitamin D,25-Hydroxy     Order Specific Question:   Release to patient     Answer:   Routine Release [[id number]]    CBC and Differential     Order Specific Question:   Manual Differential     Answer:   No     Order Specific Question:   Release to patient     Answer:   Routine Release [[id number]]               Follow Up     Return if symptoms worsen or fail to improve.    Patient was given instructions and counseling regarding her condition or for health maintenance advice. Please see specific information pulled into the AVS if appropriate.

## 2024-08-21 NOTE — ASSESSMENT & PLAN NOTE
Health maintenance updated, labs ordered  Work on relaxation techniques for stress  Continue Citalopram as directed  Return in 1 year for annual Medicare wellness

## 2024-08-21 NOTE — PATIENT INSTRUCTIONS
Medicare Wellness  Personal Prevention Plan of Service     Date of Office Visit:    Encounter Provider:  DOTTIE Cruz  Place of Service:  [facility]  Patient Name: Braydon Soria  :  1965    As part of the Medicare Wellness portion of your visit today, we are providing you with this personalized preventive plan of services (PPPS). This plan is based upon recommendations of the [place] Preventive Services Task Force (USPSTF) and the Advisory Committee on Immunization Practices (ACIP).    This lists the preventive care services that should be considered, and provides dates of when you are due. Items listed as completed are up-to-date and do not require any further intervention.    Health Maintenance   Topic Date Due    TDAP/TD VACCINES (3 - Tdap) 2009    ZOSTER VACCINE (1 of 2) Never done    COVID-19 Vaccine ( - -24 season) 2023    ANNUAL WELLNESS VISIT  2023    BMI FOLLOWUP  2024    LIPID PANEL  2024    INFLUENZA VACCINE  2024    MAMMOGRAM  2026    PAP SMEAR  2027    COLORECTAL CANCER SCREENING  2030    HEPATITIS C SCREENING  Completed    Pneumococcal Vaccine 0-64  Aged Out       No orders of the defined types were placed in this encounter.      Return if symptoms worsen or fail to improve.

## 2024-08-26 LAB
25(OH)D3+25(OH)D2 SERPL-MCNC: 41.8 NG/ML (ref 30–100)
ALBUMIN SERPL-MCNC: 4.2 G/DL (ref 3.5–5.2)
ALBUMIN/GLOB SERPL: 1.6 G/DL
ALP SERPL-CCNC: 57 U/L (ref 39–117)
ALT SERPL-CCNC: 11 U/L (ref 1–33)
AST SERPL-CCNC: 14 U/L (ref 1–32)
BASOPHILS # BLD AUTO: 0.04 10*3/MM3 (ref 0–0.2)
BASOPHILS NFR BLD AUTO: 0.5 % (ref 0–1.5)
BILIRUB SERPL-MCNC: 0.5 MG/DL (ref 0–1.2)
BUN SERPL-MCNC: 16 MG/DL (ref 6–20)
BUN/CREAT SERPL: 22.5 (ref 7–25)
CALCIUM SERPL-MCNC: 9.5 MG/DL (ref 8.6–10.5)
CHLORIDE SERPL-SCNC: 103 MMOL/L (ref 98–107)
CHOLEST SERPL-MCNC: 216 MG/DL (ref 0–200)
CO2 SERPL-SCNC: 27.6 MMOL/L (ref 22–29)
CREAT SERPL-MCNC: 0.71 MG/DL (ref 0.57–1)
EGFRCR SERPLBLD CKD-EPI 2021: 98.7 ML/MIN/1.73
EOSINOPHIL # BLD AUTO: 0.39 10*3/MM3 (ref 0–0.4)
EOSINOPHIL NFR BLD AUTO: 5.1 % (ref 0.3–6.2)
ERYTHROCYTE [DISTWIDTH] IN BLOOD BY AUTOMATED COUNT: 12.9 % (ref 12.3–15.4)
GLOBULIN SER CALC-MCNC: 2.6 GM/DL
GLUCOSE SERPL-MCNC: 103 MG/DL (ref 65–99)
HBA1C MFR BLD: 5.6 % (ref 4.8–5.6)
HCT VFR BLD AUTO: 41.5 % (ref 34–46.6)
HDLC SERPL-MCNC: 45 MG/DL (ref 40–60)
HGB BLD-MCNC: 13.6 G/DL (ref 12–15.9)
IMM GRANULOCYTES # BLD AUTO: 0.04 10*3/MM3 (ref 0–0.05)
IMM GRANULOCYTES NFR BLD AUTO: 0.5 % (ref 0–0.5)
LDLC SERPL CALC-MCNC: 140 MG/DL (ref 0–100)
LYMPHOCYTES # BLD AUTO: 3.34 10*3/MM3 (ref 0.7–3.1)
LYMPHOCYTES NFR BLD AUTO: 43.9 % (ref 19.6–45.3)
MCH RBC QN AUTO: 29.4 PG (ref 26.6–33)
MCHC RBC AUTO-ENTMCNC: 32.8 G/DL (ref 31.5–35.7)
MCV RBC AUTO: 89.6 FL (ref 79–97)
MONOCYTES # BLD AUTO: 0.64 10*3/MM3 (ref 0.1–0.9)
MONOCYTES NFR BLD AUTO: 8.4 % (ref 5–12)
NEUTROPHILS # BLD AUTO: 3.16 10*3/MM3 (ref 1.7–7)
NEUTROPHILS NFR BLD AUTO: 41.6 % (ref 42.7–76)
NRBC BLD AUTO-RTO: 0 /100 WBC (ref 0–0.2)
PLATELET # BLD AUTO: 286 10*3/MM3 (ref 140–450)
POTASSIUM SERPL-SCNC: 4.2 MMOL/L (ref 3.5–5.2)
PROT SERPL-MCNC: 6.8 G/DL (ref 6–8.5)
RBC # BLD AUTO: 4.63 10*6/MM3 (ref 3.77–5.28)
SODIUM SERPL-SCNC: 141 MMOL/L (ref 136–145)
TRIGL SERPL-MCNC: 170 MG/DL (ref 0–150)
TSH SERPL DL<=0.005 MIU/L-ACNC: 2.71 UIU/ML (ref 0.27–4.2)
VLDLC SERPL CALC-MCNC: 31 MG/DL (ref 5–40)
WBC # BLD AUTO: 7.61 10*3/MM3 (ref 3.4–10.8)

## 2024-08-29 RX ORDER — AMLODIPINE BESYLATE 5 MG/1
TABLET ORAL
Qty: 90 TABLET | Refills: 4 | Status: SHIPPED | OUTPATIENT
Start: 2024-08-29

## 2025-01-02 RX ORDER — BISOPROLOL FUMARATE AND HYDROCHLOROTHIAZIDE 10; 6.25 MG/1; MG/1
TABLET ORAL
Qty: 90 TABLET | Refills: 1 | Status: SHIPPED | OUTPATIENT
Start: 2025-01-02

## 2025-02-04 ENCOUNTER — TELEPHONE (OUTPATIENT)
Dept: FAMILY MEDICINE CLINIC | Facility: CLINIC | Age: 60
End: 2025-02-04
Payer: MEDICARE

## 2025-02-04 NOTE — TELEPHONE ENCOUNTER
Caller: Braydon Soria    Relationship: Self    Best call back number: [phone]     What orders are you requesting (i.e. lab or imaging): LABS    Where will you receive your lab/imaging services: OFFICE     Additional notes: PATIENT STATED THAT RASHAAD VERN WANTED HER TO COME BACK IN 6 MONTHS TO DO BLOOD WORK. PLEASE ADVISE.

## 2025-02-05 NOTE — TELEPHONE ENCOUNTER
Caller: Braydon Soria    Relationship to patient: Self    Best call back number: [phone]     Chief complaint: LAB SCHEDULING    Type of visit: LABS    Requested date: 02/07/2025     Additional notes:PATIENT IS NEEDING TO HAVE LABS SCHEDULED AND IS SCHEDULED TO SEE RASHAAD ON FRIDAY 02/07/2025 AND WOULD LIKE TO COME IN TO DO THAT.     PLEASE CALL PATIENT TO SCHEDULE.
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
## 2025-02-07 ENCOUNTER — OFFICE VISIT (OUTPATIENT)
Dept: FAMILY MEDICINE CLINIC | Facility: CLINIC | Age: 60
End: 2025-02-07
Payer: MEDICARE

## 2025-02-07 ENCOUNTER — TELEPHONE (OUTPATIENT)
Dept: FAMILY MEDICINE CLINIC | Facility: CLINIC | Age: 60
End: 2025-02-07

## 2025-02-07 VITALS
WEIGHT: 186.6 LBS | HEIGHT: 65 IN | DIASTOLIC BLOOD PRESSURE: 76 MMHG | SYSTOLIC BLOOD PRESSURE: 130 MMHG | OXYGEN SATURATION: 96 % | TEMPERATURE: 98 F | BODY MASS INDEX: 31.09 KG/M2 | HEART RATE: 54 BPM

## 2025-02-07 DIAGNOSIS — R11.0 NAUSEA: ICD-10-CM

## 2025-02-07 DIAGNOSIS — M79.662 PAIN OF LEFT CALF: Primary | ICD-10-CM

## 2025-02-07 DIAGNOSIS — R19.7 DIARRHEA, UNSPECIFIED TYPE: ICD-10-CM

## 2025-02-07 DIAGNOSIS — E78.2 MIXED HYPERLIPIDEMIA: ICD-10-CM

## 2025-02-07 DIAGNOSIS — R73.03 PREDIABETES: ICD-10-CM

## 2025-02-07 DIAGNOSIS — R10.9 ABDOMINAL CRAMPING: ICD-10-CM

## 2025-02-07 PROCEDURE — 1160F RVW MEDS BY RX/DR IN RCRD: CPT

## 2025-02-07 PROCEDURE — 1125F AMNT PAIN NOTED PAIN PRSNT: CPT

## 2025-02-07 PROCEDURE — G2211 COMPLEX E/M VISIT ADD ON: HCPCS

## 2025-02-07 PROCEDURE — 3075F SYST BP GE 130 - 139MM HG: CPT

## 2025-02-07 PROCEDURE — 3078F DIAST BP <80 MM HG: CPT

## 2025-02-07 PROCEDURE — 99214 OFFICE O/P EST MOD 30 MIN: CPT

## 2025-02-07 PROCEDURE — 1159F MED LIST DOCD IN RCRD: CPT

## 2025-02-07 RX ORDER — ONDANSETRON 4 MG/1
4 TABLET, ORALLY DISINTEGRATING ORAL EVERY 8 HOURS PRN
Qty: 30 TABLET | Refills: 1 | Status: SHIPPED | OUTPATIENT
Start: 2025-02-07
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
## 2025-02-07 NOTE — PROGRESS NOTES
Chief Complaint  Leg Pain (Left x's 2-3 days, intermittently for months) and Nausea (X's 2 days)    Subjective          Leg Pain   Pertinent negatives include no numbness.   Nausea  Symptoms include abdominal pain (cramping), myalgias (left calf pain) and nausea.    Pertinent negative symptoms include no chest pain, no chills, no fever, no numbness, no dysuria, no vomiting and no weakness.     History of Present Illness  Braydon Soria 59 y.o. female presents for evaluation of left leg pain, gastrointestinal upset, and rheumatoid arthritis.    Ms. Soria reports experiencing intermittent pain in her left calf and posterior knee, which started approximately 2 months ago and recurred 2 days ago. She is uncertain if the pain is exacerbated by walking. There is no reported swelling or redness in the affected leg. She has been managing the pain with Ibuprofen, which she finds beneficial.    Additionally, she reports a sensation of heaviness in her stomach, occasional nausea, and diarrhea that occurred 2 to 3 days ago. She also experiences bloating and excessive gas, particularly after consuming fatty foods. She does not consume carbonated beverages, but admits to drinking coffee. She has been fasting prior to this visit. She has a history of cholecystectomy.    She has a known diagnosis of rheumatoid arthritis, which was diagnosed 7 years ago. She experiences significant pain in her hands and other joints during weather changes. She is currently on medication for post-traumatic stress disorder and is seeking information about the potential use of medical marijuana for her symptoms.    ALLERGIES  The patient has no known allergies.    MEDICATIONS  Ibuprofen        Review of Systems   Constitutional:  Negative for chills and fever.   Respiratory:  Negative for chest tightness and shortness of breath.    Cardiovascular:  Negative for chest pain, palpitations and leg swelling.   Gastrointestinal:  Positive for abdominal  "pain (cramping), diarrhea and nausea. Negative for anal bleeding, blood in stool, rectal pain and vomiting.   Genitourinary:  Negative for difficulty urinating and dysuria.   Musculoskeletal:  Positive for myalgias (left calf pain). Negative for joint swelling.   Neurological:  Negative for dizziness, weakness, light-headedness and numbness.        Objective   Vital Signs:   /76 (BP Location: Left arm, Patient Position: Sitting, Cuff Size: Adult)   Pulse 54   Temp 98 °F (36.7 °C) (Oral)   Ht 165.1 cm (65\")   Wt 84.6 kg (186 lb 9.6 oz)   SpO2 96%   BMI 31.05 kg/m²        Physical Exam  Vitals and nursing note reviewed.   Constitutional:       General: She is not in acute distress.     Appearance: She is well-developed. She is not ill-appearing.   HENT:      Head: Normocephalic and atraumatic.   Eyes:      General: No scleral icterus.     Conjunctiva/sclera: Conjunctivae normal.      Pupils: Pupils are equal, round, and reactive to light.   Cardiovascular:      Rate and Rhythm: Regular rhythm. Bradycardia present.      Pulses:           Posterior tibial pulses are 2+ on the right side and 2+ on the left side.      Heart sounds: Normal heart sounds.   Pulmonary:      Effort: Pulmonary effort is normal. No respiratory distress.      Breath sounds: Normal breath sounds. No wheezing or rales.   Abdominal:      General: Bowel sounds are normal.      Palpations: Abdomen is soft.      Tenderness: There is no abdominal tenderness. There is no guarding or rebound.      Comments: No abdominal tenderness with palpation.  Abdomen palpates soft.  Bowel sounds are active in all 4 quadrants.  No guarding or rebound tenderness.   Musculoskeletal:         General: No tenderness.      Cervical back: Normal range of motion and neck supple.      Right lower leg: No edema.      Left lower leg: No swelling, tenderness or bony tenderness. No edema.      Comments: No calf pain with palpation of the left lower extremity. Negative " Sherif's sign of the left lower extremity. No unilateral swelling, erythema, or warmth with palpation.   Skin:     General: Skin is warm and dry.      Findings: No rash.   Neurological:      Mental Status: She is alert and oriented to person, place, and time.      Sensory: No sensory deficit.      Motor: No weakness or abnormal muscle tone.      Coordination: Coordination normal.      Gait: Gait normal.   Psychiatric:         Mood and Affect: Mood normal.        Physical Exam  Lungs were auscultated.  Heart was examined.  Bowel sounds are normal and active.    The following data was reviewed by: DOTTIE Cruz on 02/07/2025:  Lipid panel (08/26/2024 09:49)  Hemoglobin A1c (08/26/2024 09:49)  Comprehensive metabolic panel (08/26/2024 09:49)     Results                 Assessment and Plan    Assessment & Plan  1. Left leg pain.  The etiology of the pain is likely due to a muscle strain, as it intensifies during ambulation and subsides with rest. However, given the location of the pain in the calf region, a venous duplex study is warranted to rule out the possibility of a deep vein thrombosis (DVT). She has been advised to rest and elevate the affected leg, apply ice in case of swelling, and continue with Ibuprofen for pain management. The use of compression socks has also been recommended. A venous duplex study will be conducted today to exclude the presence of a DVT. If the venous duplex study is negative, the diagnosis of muscle strain will be confirmed.    2. Gastrointestinal upset.  Her symptoms may be indicative of a gastrointestinal virus, which is currently prevalent. She has been advised to increase fluid intake to stay hydrated, limit her intake of fatty foods and beans, and to adhere to a clear liquid diet for the next 24 hours. She has been encouraged to maintain adequate hydration. Over-the-counter Imodium can be used as needed for diarrhea. A prescription for Ondansetron has been provided for nausea,  to be taken as needed.    3. Rheumatoid arthritis.  She has a history of rheumatoid arthritis and experiences pain in her hands and other joints, especially with weather changes. She has been advised to consult with her Psychiatrist regarding the use of medical marijuana for symptom management, as the patient has requested this today.    4. Health maintenance.  A comprehensive lab workup will be conducted today to assess her electrolyte levels, potential anemia, kidney and liver function, blood glucose levels, and cholesterol profile.    PROCEDURE  The patient has a history of cholecystectomy.    Assessment & Plan  Pain of left calf    Orders:    Duplex Venous Lower Extremity - Left CAR    Nausea    Orders:    Comprehensive metabolic panel    Hemoglobin A1c    ondansetron ODT (ZOFRAN-ODT) 4 MG disintegrating tablet; Place 1 tablet on the tongue Every 8 (Eight) Hours As Needed for Nausea or Vomiting.    Abdominal cramping    Orders:    Comprehensive metabolic panel    Lipid panel    CBC and Differential    TSH    Hemoglobin A1c    Diarrhea, unspecified type    Orders:    Comprehensive metabolic panel    CBC and Differential    Prediabetes    Orders:    Comprehensive metabolic panel    TSH    Hemoglobin A1c    Mixed hyperlipidemia       Orders:    Lipid panel             Follow Up     Return if symptoms worsen or fail to improve.    Patient or patient representative verbalized consent for the use of Ambient Listening during the visit with  DOTTIE Cruz for chart documentation. 2/7/2025  09:28 EST    Patient was given instructions and counseling regarding her condition or for health maintenance advice. Please see specific information pulled into the AVS if appropriate.

## 2025-02-08 LAB
ALBUMIN SERPL-MCNC: 4.5 G/DL (ref 3.5–5.2)
ALBUMIN/GLOB SERPL: 1.9 G/DL
ALP SERPL-CCNC: 59 U/L (ref 39–117)
ALT SERPL-CCNC: 15 U/L (ref 1–33)
AST SERPL-CCNC: 18 U/L (ref 1–32)
BASOPHILS # BLD AUTO: 0.05 10*3/MM3 (ref 0–0.2)
BASOPHILS NFR BLD AUTO: 0.6 % (ref 0–1.5)
BILIRUB SERPL-MCNC: 0.4 MG/DL (ref 0–1.2)
BUN SERPL-MCNC: 12 MG/DL (ref 6–20)
BUN/CREAT SERPL: 16.7 (ref 7–25)
CALCIUM SERPL-MCNC: 9.8 MG/DL (ref 8.6–10.5)
CHLORIDE SERPL-SCNC: 100 MMOL/L (ref 98–107)
CHOLEST SERPL-MCNC: 204 MG/DL (ref 0–200)
CO2 SERPL-SCNC: 27.2 MMOL/L (ref 22–29)
CREAT SERPL-MCNC: 0.72 MG/DL (ref 0.57–1)
EGFRCR SERPLBLD CKD-EPI 2021: 96.5 ML/MIN/1.73
EOSINOPHIL # BLD AUTO: 0.31 10*3/MM3 (ref 0–0.4)
EOSINOPHIL NFR BLD AUTO: 3.9 % (ref 0.3–6.2)
ERYTHROCYTE [DISTWIDTH] IN BLOOD BY AUTOMATED COUNT: 12.9 % (ref 12.3–15.4)
GLOBULIN SER CALC-MCNC: 2.4 GM/DL
GLUCOSE SERPL-MCNC: 110 MG/DL (ref 65–99)
HBA1C MFR BLD: 5.3 % (ref 4.8–5.6)
HCT VFR BLD AUTO: 42.5 % (ref 34–46.6)
HDLC SERPL-MCNC: 54 MG/DL (ref 40–60)
HGB BLD-MCNC: 14.3 G/DL (ref 12–15.9)
IMM GRANULOCYTES # BLD AUTO: 0.02 10*3/MM3 (ref 0–0.05)
IMM GRANULOCYTES NFR BLD AUTO: 0.3 % (ref 0–0.5)
LDLC SERPL CALC-MCNC: 125 MG/DL (ref 0–100)
LYMPHOCYTES # BLD AUTO: 3.17 10*3/MM3 (ref 0.7–3.1)
LYMPHOCYTES NFR BLD AUTO: 39.9 % (ref 19.6–45.3)
MCH RBC QN AUTO: 30.7 PG (ref 26.6–33)
MCHC RBC AUTO-ENTMCNC: 33.6 G/DL (ref 31.5–35.7)
MCV RBC AUTO: 91.2 FL (ref 79–97)
MONOCYTES # BLD AUTO: 0.68 10*3/MM3 (ref 0.1–0.9)
MONOCYTES NFR BLD AUTO: 8.6 % (ref 5–12)
NEUTROPHILS # BLD AUTO: 3.72 10*3/MM3 (ref 1.7–7)
NEUTROPHILS NFR BLD AUTO: 46.7 % (ref 42.7–76)
NRBC BLD AUTO-RTO: 0 /100 WBC (ref 0–0.2)
PLATELET # BLD AUTO: 296 10*3/MM3 (ref 140–450)
POTASSIUM SERPL-SCNC: 4.5 MMOL/L (ref 3.5–5.2)
PROT SERPL-MCNC: 6.9 G/DL (ref 6–8.5)
RBC # BLD AUTO: 4.66 10*6/MM3 (ref 3.77–5.28)
SODIUM SERPL-SCNC: 140 MMOL/L (ref 136–145)
TRIGL SERPL-MCNC: 140 MG/DL (ref 0–150)
TSH SERPL DL<=0.005 MIU/L-ACNC: 2.52 UIU/ML (ref 0.27–4.2)
VLDLC SERPL CALC-MCNC: 25 MG/DL (ref 5–40)
WBC # BLD AUTO: 7.95 10*3/MM3 (ref 3.4–10.8)

## 2025-06-09 ENCOUNTER — OFFICE VISIT (OUTPATIENT)
Dept: CARDIOLOGY | Facility: CLINIC | Age: 60
End: 2025-06-09
Payer: MEDICARE

## 2025-06-09 VITALS
WEIGHT: 182 LBS | DIASTOLIC BLOOD PRESSURE: 75 MMHG | HEART RATE: 57 BPM | HEIGHT: 65 IN | SYSTOLIC BLOOD PRESSURE: 131 MMHG | BODY MASS INDEX: 30.32 KG/M2

## 2025-06-09 DIAGNOSIS — I10 ESSENTIAL HYPERTENSION: ICD-10-CM

## 2025-06-09 DIAGNOSIS — E78.00 PURE HYPERCHOLESTEROLEMIA: Primary | ICD-10-CM

## 2025-06-09 PROCEDURE — 3075F SYST BP GE 130 - 139MM HG: CPT | Performed by: INTERNAL MEDICINE

## 2025-06-09 PROCEDURE — 99213 OFFICE O/P EST LOW 20 MIN: CPT | Performed by: INTERNAL MEDICINE

## 2025-06-09 PROCEDURE — 3078F DIAST BP <80 MM HG: CPT | Performed by: INTERNAL MEDICINE

## 2025-06-09 PROCEDURE — 93000 ELECTROCARDIOGRAM COMPLETE: CPT | Performed by: INTERNAL MEDICINE

## 2025-06-09 NOTE — PROGRESS NOTES
Subjective:        Braydon Soria is a 59 y.o. female who here for follow up    CC  Follow-up hypertension hyperlipidemia  HPI  59-year-old female with hypertension hyperlipidemia here for the follow-up denies any chest pains or tightness in the chest     Problems Addressed this Visit          Cardiac and Vasculature    Essential hypertension    Hyperlipidemia - Primary     Diagnoses         Codes Comments      Pure hypercholesterolemia    -  Primary ICD-10-CM: E78.00  ICD-9-CM: 272.0       Essential hypertension     ICD-10-CM: I10  ICD-9-CM: 401.9           .    The following portions of the patient's history were reviewed and updated as appropriate: allergies, current medications, past family history, past medical history, past social history, past surgical history and problem list.    Past Medical History:   Diagnosis Date    Anxiety     Arthritis     Chest pressure     Cholelithiasis     Depression     Dyspnea     Gallbladder disease     gallstones    Hormone replacement therapy (HRT)     Hypercholesterolemia     Hyperlipidemia     Hypertension     Insomnia     Low back pain     Lumbosacral disc disease     Migraines     Obesity     Peripheral neuropathy     Rheumatoid arthritis     SOB (shortness of breath)     Thyroid nodule     Visual impairment      reports that she has been smoking cigarettes. She started smoking about 40 years ago. She has a 10.1 pack-year smoking history. She has been exposed to tobacco smoke. She has never used smokeless tobacco. She reports that she does not drink alcohol and does not use drugs.   Family History   Problem Relation Age of Onset    Stroke Mother     Migraines Mother     Heart disease Father     Heart attack Father     Early death Father     Heart disease Brother     Heart attack Brother     Stroke Paternal Grandmother     Breast cancer Neg Hx        Review of Systems  Constitutional: No wt loss, fever, fatigue  Gastrointestinal: No nausea, abdominal  "pain  Behavioral/Psych: No insomnia or anxiety   Cardiovascular no chest pains or tightness in the chest  Objective:       Physical Exam  /75   Pulse 57   Ht 165.1 cm (65\")   Wt 82.6 kg (182 lb)   LMP 01/01/2015   BMI 30.29 kg/m²   General appearance: No acute changes   Neck: Trachea midline; NECK, supple, no thyromegaly or lymphadenopathy   Lungs: Normal size and shape, normal breath sounds, equal distribution of air, no rales and rhonchi   CV: S1-S2 regular, no murmurs, no rub, no gallop   Abdomen: Soft, nontender; no masses , no abnormal abdominal sounds   Extremities: No deformity , normal color , no peripheral edema   Skin: Normal temperature, turgor and texture; no rash, ulcers            ECG 12 Lead    Date/Time: 6/9/2025 1:15 PM  Performed by: Lakhwinder Amos MD    Authorized by: Lakhwinder Amos MD  Comparison: compared with previous ECG   Similar to previous ECG  Rhythm: sinus rhythm    Clinical impression: non-specific ECG            Echocardiogram:    Results for orders placed during the hospital encounter of 04/08/21    Adult Transthoracic Echo Complete W/ Cont if Necessary Per Protocol    Interpretation Summary  · Calculated left ventricular EF = 64.5% Estimated left ventricular EF was in agreement with the calculated left ventricular EF.  · Left ventricular diastolic function was normal.  · There is no evidence of pericardial effusion.          Current Outpatient Medications:     amLODIPine (NORVASC) 5 MG tablet, Take 1 tablet by mouth once daily, Disp: 90 tablet, Rfl: 4    bisoprolol-hydrochlorothiazide (ZIAC) 10-6.25 MG per tablet, Take 1 tablet by mouth once daily, Disp: 90 tablet, Rfl: 1    citalopram (CeleXA) 40 MG tablet, Take 1 tablet by mouth Daily., Disp: , Rfl:     hydrOXYzine (ATARAX) 25 MG tablet, Take 1 tablet by mouth Every 12 (Twelve) Hours., Disp: , Rfl:     ondansetron ODT (ZOFRAN-ODT) 4 MG disintegrating tablet, Place 1 tablet on the tongue Every 8 (Eight) " Hours As Needed for Nausea or Vomiting., Disp: 30 tablet, Rfl: 1    rosuvastatin (CRESTOR) 5 MG tablet, Take 1 tablet by mouth once daily, Disp: 90 tablet, Rfl: 2    vitamin B-12 (CYANOCOBALAMIN) 100 MCG tablet, Take 0.5 tablets by mouth Daily., Disp: , Rfl:     vitamin D3 125 MCG (5000 UT) capsule capsule, Take 1 capsule by mouth Daily., Disp: , Rfl:    Assessment:                Plan:          ICD-10-CM ICD-9-CM   1. Pure hypercholesterolemia  E78.00 272.0   2. Essential hypertension  I10 401.9     1. Pure hypercholesterolemia  Continue current treatment    2. Essential hypertension  Continue current treatment      1 YR  COUNSELING:    Braydon Titus was given to patient for the following topics: diagnostic results, risk factor reductions, impressions, risks and benefits of treatment options and importance of treatment compliance .       SMOKING COUNSELING:        Dictated using Dragon dictation

## 2025-07-01 RX ORDER — BISOPROLOL FUMARATE AND HYDROCHLOROTHIAZIDE 10; 6.25 MG/1; MG/1
1 TABLET ORAL DAILY
Qty: 90 TABLET | Refills: 4 | Status: SHIPPED | OUTPATIENT
Start: 2025-07-01